# Patient Record
Sex: FEMALE | Race: WHITE | NOT HISPANIC OR LATINO | Employment: PART TIME | URBAN - METROPOLITAN AREA
[De-identification: names, ages, dates, MRNs, and addresses within clinical notes are randomized per-mention and may not be internally consistent; named-entity substitution may affect disease eponyms.]

---

## 2018-09-27 ENCOUNTER — OFFICE VISIT (OUTPATIENT)
Dept: CARDIOLOGY CLINIC | Facility: CLINIC | Age: 58
End: 2018-09-27

## 2018-09-27 VITALS
DIASTOLIC BLOOD PRESSURE: 72 MMHG | SYSTOLIC BLOOD PRESSURE: 134 MMHG | OXYGEN SATURATION: 98 % | HEART RATE: 80 BPM | WEIGHT: 187.9 LBS | BODY MASS INDEX: 28.48 KG/M2 | HEIGHT: 68 IN

## 2018-09-27 DIAGNOSIS — R07.9 CHEST PAIN, UNSPECIFIED TYPE: Primary | ICD-10-CM

## 2018-09-27 DIAGNOSIS — M19.90 ARTHRITIS: ICD-10-CM

## 2018-09-27 DIAGNOSIS — R20.2 NUMBNESS AND TINGLING IN LEFT ARM: ICD-10-CM

## 2018-09-27 DIAGNOSIS — R06.02 SHORTNESS OF BREATH: ICD-10-CM

## 2018-09-27 DIAGNOSIS — R20.0 NUMBNESS AND TINGLING IN LEFT ARM: ICD-10-CM

## 2018-09-27 PROCEDURE — 93000 ELECTROCARDIOGRAM COMPLETE: CPT | Performed by: INTERNAL MEDICINE

## 2018-09-27 PROCEDURE — 99243 OFF/OP CNSLTJ NEW/EST LOW 30: CPT | Performed by: INTERNAL MEDICINE

## 2018-09-27 RX ORDER — LORATADINE 10 MG/1
10 TABLET ORAL DAILY PRN
COMMUNITY

## 2018-09-27 RX ORDER — AMILORIDE HCL 5 MG
10 TABLET ORAL EVERY 4 HOURS PRN
COMMUNITY

## 2018-09-27 RX ORDER — COVID-19 ANTIGEN TEST
KIT MISCELLANEOUS AS NEEDED
COMMUNITY
End: 2019-09-20

## 2018-09-27 NOTE — LETTER
September 27, 2018     Maggie Mcdonald, [address] [facility]    Patient: Jorge Garcia   YOB: 1960   Date of Visit: 9/27/2018       Dear Dr Deisi Kumar: Thank you for referring Jorge Garcia to me for evaluation  Below are my notes for this consultation  If you have questions, please do not hesitate to call me  I look forward to following your patient along with you  Sincerely,        Medhat Nolasco MD        CC: No Recipients  Medhat Nolasco MD  9/27/2018  1:02 PM  Sign at close encounter                                             Cardiology Consultation     Jorge Garcia  [id number]  1960  [facility] [address] [id number]      1  Chest pain, unspecified type  POCT ECG    NM myocardial perfusion spect (rx stress and/or rest)   2  Shortness of breath  POCT ECG    NM myocardial perfusion spect (rx stress and/or rest)   3  Numbness and tingling in left arm  POCT ECG    NM myocardial perfusion spect (rx stress and/or rest)   4  Arthritis  NM myocardial perfusion spect (rx stress and/or rest)       Discussion/Summary:    Chest pain, shortness of breath on exertion  New symptoms, worse over the last few months  She is limited by arthritis of the hip, had replacement on the left, planned to have the right done  Normal EKG  Symptoms warrant evaluation with stress test  Given she walks with cane, will not be able to exercise on a treadmill, and therefore will check pharmacologic nuclear stress test   Blood work including last lipid panel reviewed  BP at goal     No changes to medical therapy at this time  If abnormalities on stress test, will evaluate further  If stress test normal, there would be no contraindication to proceeding with hip replacement either  History of Present Illness:    33-year-old female    She is referred to the office by Dr Deisi Kumar for evaluation of symptoms of chest pain     There is no known cardiac history  She tells me she was admitted to the hospital in 2009 or 10 for symptoms of chest pain  She was monitored overnight and said she had testing including a stress test and echocardiogram   Although she believes this was at the [facility], and fortunately do not have any records of this  She was told that these tests were unremarkable, and she has been relatively stable since that time  Recently, she has been experiencing symptoms chest pain and tingling down her left arm  She is typically pretty active  Her  does a lot of the heavier lifting, but they have a large property in [place] that they tend to  She has been noticing a discomfort on the left side of her chest which is worse when she does any exertion or activity  She has also been noticing getting winded when climbing up her flights of stairs which is new over the last few months  Some symptoms are atypical   For example, she felt a very sharp chest pain on the left side of her chest the other day which lasted for just a 2nd and then resolve spontaneously  This was so sharp it caused her to double over and scream out in pain  She has a history of reflux  She has been trying to associate any symptoms of her chest pain with any other circumstances such as position, food, time of day  She has not found anything reliable  She has also felt occasional palpitations  She feels these as skipped beat  She takes her pulse and notices that there is a skipped beat every 3rd to 7 beat on occasions  She denies any presyncope or syncope  When she saw her primary provider, manipulation of her neck in certain positions did reproduce some tingling of her left arm  She had hip surgery done in February  She still uses a cane  She has arthritis of her right hip and is to have surgery probably next year  Remote smoking when she was in college  No family history of coronary disease    Last lipid profile from a few years ago showed good control  Otherwise unremarkable blood work in February as well  There is no problem list on file for this patient  Past Medical History:   Diagnosis Date    GERD (gastroesophageal reflux disease)      Social History     Social History    Marital status: /Civil Union     Spouse name: N/A    Number of children: N/A    Years of education: N/A     Occupational History    Not on file       Social History Main Topics    Smoking status: Former Smoker     Quit date: 1983    Smokeless tobacco: Never Used    Alcohol use No    Drug use: No    Sexual activity: Not on file     Other Topics Concern    Not on file     Social History Narrative    No narrative on file      Family History   Problem Relation Age of Onset    Hypertension Mother     Diabetes Mother     Cancer Mother         breast    Coronary artery disease Mother     Arrhythmia Mother     Clotting disorder Mother         clot in leg    Cancer Father         lung    Hyperlipidemia Sister     Diabetes Paternal Grandmother     Diabetes Paternal Grandfather     Crohn's disease Sister     Stroke Neg Hx     Anuerysm Neg Hx      Past Surgical History:   Procedure Laterality Date    CYSTOSCOPY  1983    TOTAL HIP ARTHROPLASTY Left 02/15/2018    URETHRAL DILATION  1983       Current Outpatient Prescriptions:     loratadine (CLARITIN) 10 mg tablet, Take 10 mg by mouth daily, Disp: , Rfl:     Naproxen Sodium (ALEVE) 220 MG CAPS, Take by mouth as needed, Disp: , Rfl:     phenylephrine (SUDAFED PE) 10 MG TABS, Take 10 mg by mouth every 4 (four) hours as needed for congestion, Disp: , Rfl:   Allergies   Allergen Reactions    Sulfa Antibiotics        Vitals:    09/27/18 1128   BP: 134/72   BP Location: Left arm   Patient Position: Sitting   Cuff Size: Adult   Pulse: 80   SpO2: 98%   Weight: 85 2 kg (187 lb 14 4 oz)   Height: 5' 8" (1 727 m)     Vitals:    09/27/18 1128   Weight: 85 2 kg (187 lb 14 4 oz)      Height: 5' 8" (172 7 cm)   Body mass index is 28 57 kg/m²  Physical Exam:  GENERAL: Alert, well appearing, and in no distress  HEENT:  PERRL, EOMI, no scleral icterus, no conjunctival pallor  NECK:  Supple, No elevated JVP, no thyromegaly, no carotid bruits  HEART:  Regular rate and rhythm, normal S1/S2, no S3/S4, no murmur or rub  LUNGS:  Clear to auscultation bilaterally  ABDOMEN:  Soft, non-tender, positive bowel sounds, no rebound or guarding  EXTREMITIES:  No edema  VASCULAR:  Normal pedal pulses   NEURO: No focal deficits  Walks with cane  SKIN: Normal without suspicious lesions on exposed skin    ROS:  Positive for joint pain, arthritis, palpitations, reflux  Except as noted in HPI, is otherwise reviewed in detail on the paper form  Labs:  Lab Results   Component Value Date     05/05/2016    K 3 8 05/05/2016     05/05/2016    CREATININE 0 66 05/05/2016    BUN 19 05/05/2016    CO2 28 05/05/2016    ALT 23 05/05/2016    AST 14 05/05/2016    WBC 5 84 05/05/2016    HGB 12 9 05/05/2016    HCT 38 7 05/05/2016     05/05/2016       Lab Results   Component Value Date    CHOL 169 04/13/2015     Lab Results   Component Value Date    HDL 66 (H) 05/05/2016    HDL 73 04/13/2015     Lab Results   Component Value Date    LDLCALC 91 05/05/2016    LDLCALC 82 04/13/2015     Lab Results   Component Value Date    TRIG 84 05/05/2016    TRIG 69 04/13/2015     EKG:  Sinus rhythm, 80 beats per minute   Normal EKG

## 2018-09-27 NOTE — PROGRESS NOTES
Cardiology Consultation     Adam Sender  [id number]  1960  [facility] [address]  [facility] [address] [address]      1  Chest pain, unspecified type  POCT ECG    NM myocardial perfusion spect (rx stress and/or rest)   2  Shortness of breath  POCT ECG    NM myocardial perfusion spect (rx stress and/or rest)   3  Numbness and tingling in left arm  POCT ECG    NM myocardial perfusion spect (rx stress and/or rest)   4  Arthritis  NM myocardial perfusion spect (rx stress and/or rest)       Discussion/Summary:    Chest pain, shortness of breath on exertion  New symptoms, worse over the last few months  She is limited by arthritis of the hip, had replacement on the left, planned to have the right done  Normal EKG  Symptoms warrant evaluation with stress test  Given she walks with cane, will not be able to exercise on a treadmill, and therefore will check pharmacologic nuclear stress test   Blood work including last lipid panel reviewed  BP at goal     No changes to medical therapy at this time  If abnormalities on stress test, will evaluate further  If stress test normal, there would be no contraindication to proceeding with hip replacement either  History of Present Illness:    58-year-old female  She is referred to the office by Dr Amena Dolan for evaluation of symptoms of chest pain  There is no known cardiac history  She tells me she was admitted to the hospital in 2009 or 10 for symptoms of chest pain  She was monitored overnight and said she had testing including a stress test and echocardiogram   Although she believes this was at the [facility], and fortunately do not have any records of this  She was told that these tests were unremarkable, and she has been relatively stable since that time  Recently, she has been experiencing symptoms chest pain and tingling down her left arm    She is typically pretty active  Her  does a lot of the heavier lifting, but they have a large property in [place] that they tend to  She has been noticing a discomfort on the left side of her chest which is worse when she does any exertion or activity  She has also been noticing getting winded when climbing up her flights of stairs which is new over the last few months  Some symptoms are atypical   For example, she felt a very sharp chest pain on the left side of her chest the other day which lasted for just a 2nd and then resolve spontaneously  This was so sharp it caused her to double over and scream out in pain  She has a history of reflux  She has been trying to associate any symptoms of her chest pain with any other circumstances such as position, food, time of day  She has not found anything reliable  She has also felt occasional palpitations  She feels these as skipped beat  She takes her pulse and notices that there is a skipped beat every 3rd to 7 beat on occasions  She denies any presyncope or syncope  When she saw her primary provider, manipulation of her neck in certain positions did reproduce some tingling of her left arm  She had hip surgery done in February  She still uses a cane  She has arthritis of her right hip and is to have surgery probably next year  Remote smoking when she was in college  No family history of coronary disease  Last lipid profile from a few years ago showed good control  Otherwise unremarkable blood work in February as well  There is no problem list on file for this patient  Past Medical History:   Diagnosis Date    GERD (gastroesophageal reflux disease)      Social History     Social History    Marital status: /Civil Union     Spouse name: N/A    Number of children: N/A    Years of education: N/A     Occupational History    Not on file       Social History Main Topics    Smoking status: Former Smoker     Quit date: 1983    Smokeless tobacco: Never Used    Alcohol use No    Drug use: No    Sexual activity: Not on file     Other Topics Concern    Not on file     Social History Narrative    No narrative on file      Family History   Problem Relation Age of Onset    Hypertension Mother     Diabetes Mother     Cancer Mother         breast    Coronary artery disease Mother     Arrhythmia Mother     Clotting disorder Mother         clot in leg    Cancer Father         lung    Hyperlipidemia Sister     Diabetes Paternal Grandmother     Diabetes Paternal Grandfather     Crohn's disease Sister     Stroke Neg Hx     Anuerysm Neg Hx      Past Surgical History:   Procedure Laterality Date    CYSTOSCOPY  1983    TOTAL HIP ARTHROPLASTY Left 02/15/2018    URETHRAL DILATION  1983       Current Outpatient Prescriptions:     loratadine (CLARITIN) 10 mg tablet, Take 10 mg by mouth daily, Disp: , Rfl:     Naproxen Sodium (ALEVE) 220 MG CAPS, Take by mouth as needed, Disp: , Rfl:     phenylephrine (SUDAFED PE) 10 MG TABS, Take 10 mg by mouth every 4 (four) hours as needed for congestion, Disp: , Rfl:   Allergies   Allergen Reactions    Sulfa Antibiotics        Vitals:    09/27/18 1128   BP: 134/72   BP Location: Left arm   Patient Position: Sitting   Cuff Size: Adult   Pulse: 80   SpO2: 98%   Weight: 85 2 kg (187 lb 14 4 oz)   Height: 5' 8" (1 727 m)     Vitals:    09/27/18 1128   Weight: 85 2 kg (187 lb 14 4 oz)      Height: 5' 8" (172 7 cm)   Body mass index is 28 57 kg/m²      Physical Exam:  GENERAL: Alert, well appearing, and in no distress  HEENT:  PERRL, EOMI, no scleral icterus, no conjunctival pallor  NECK:  Supple, No elevated JVP, no thyromegaly, no carotid bruits  HEART:  Regular rate and rhythm, normal S1/S2, no S3/S4, no murmur or rub  LUNGS:  Clear to auscultation bilaterally  ABDOMEN:  Soft, non-tender, positive bowel sounds, no rebound or guarding  EXTREMITIES:  No edema  VASCULAR:  Normal pedal pulses   NEURO: No focal deficits  Walks with cane  SKIN: Normal without suspicious lesions on exposed skin    ROS:  Positive for joint pain, arthritis, palpitations, reflux  Except as noted in HPI, is otherwise reviewed in detail on the paper form  Labs:  Lab Results   Component Value Date     05/05/2016    K 3 8 05/05/2016     05/05/2016    CREATININE 0 66 05/05/2016    BUN 19 05/05/2016    CO2 28 05/05/2016    ALT 23 05/05/2016    AST 14 05/05/2016    WBC 5 84 05/05/2016    HGB 12 9 05/05/2016    HCT 38 7 05/05/2016     05/05/2016       Lab Results   Component Value Date    CHOL 169 04/13/2015     Lab Results   Component Value Date    HDL 66 (H) 05/05/2016    HDL 73 04/13/2015     Lab Results   Component Value Date    LDLCALC 91 05/05/2016    LDLCALC 82 04/13/2015     Lab Results   Component Value Date    TRIG 84 05/05/2016    TRIG 69 04/13/2015     EKG:  Sinus rhythm, 80 beats per minute   Normal EKG

## 2018-10-10 ENCOUNTER — HOSPITAL ENCOUNTER (OUTPATIENT)
Dept: NON INVASIVE DIAGNOSTICS | Facility: CLINIC | Age: 58
Discharge: HOME | End: 2018-10-10

## 2018-10-10 DIAGNOSIS — R07.9 CHEST PAIN, UNSPECIFIED TYPE: ICD-10-CM

## 2018-10-10 DIAGNOSIS — R06.02 SHORTNESS OF BREATH: ICD-10-CM

## 2018-10-10 DIAGNOSIS — M19.90 ARTHRITIS: ICD-10-CM

## 2018-10-10 DIAGNOSIS — R20.2 NUMBNESS AND TINGLING IN LEFT ARM: ICD-10-CM

## 2018-10-10 DIAGNOSIS — R20.0 NUMBNESS AND TINGLING IN LEFT ARM: ICD-10-CM

## 2018-10-10 PROCEDURE — 78452 HT MUSCLE IMAGE SPECT MULT: CPT

## 2018-10-10 PROCEDURE — 93017 CV STRESS TEST TRACING ONLY: CPT

## 2018-10-10 PROCEDURE — 78452 HT MUSCLE IMAGE SPECT MULT: CPT | Performed by: INTERNAL MEDICINE

## 2018-10-10 PROCEDURE — 93018 CV STRESS TEST I&R ONLY: CPT | Performed by: INTERNAL MEDICINE

## 2018-10-10 PROCEDURE — A9502 TC99M TETROFOSMIN: HCPCS

## 2018-10-10 PROCEDURE — 93016 CV STRESS TEST SUPVJ ONLY: CPT | Performed by: INTERNAL MEDICINE

## 2018-10-10 RX ADMIN — REGADENOSON 0.4 MG: 0.08 INJECTION, SOLUTION INTRAVENOUS at 13:58

## 2018-10-12 LAB
CHEST PAIN STATEMENT: NORMAL
MAX DIASTOLIC BP: 90 MMHG
MAX HEART RATE: 125 BPM
MAX PREDICTED HEART RATE: 160 BPM
MAX. SYSTOLIC BP: 164 MMHG
PROTOCOL NAME: NORMAL
REASON FOR TERMINATION: NORMAL
TARGET HR FORMULA: NORMAL
TEST INDICATION: NORMAL
TIME IN EXERCISE PHASE: NORMAL

## 2019-08-16 ENCOUNTER — OFFICE VISIT (OUTPATIENT)
Dept: CARDIOLOGY CLINIC | Facility: CLINIC | Age: 59
End: 2019-08-16

## 2019-08-16 VITALS
HEART RATE: 61 BPM | SYSTOLIC BLOOD PRESSURE: 130 MMHG | RESPIRATION RATE: 19 BRPM | BODY MASS INDEX: 26.07 KG/M2 | WEIGHT: 172 LBS | OXYGEN SATURATION: 99 % | HEIGHT: 68 IN | DIASTOLIC BLOOD PRESSURE: 82 MMHG

## 2019-08-16 DIAGNOSIS — Z01.810 PREOPERATIVE CARDIOVASCULAR EXAMINATION: Primary | ICD-10-CM

## 2019-08-16 PROCEDURE — 93000 ELECTROCARDIOGRAM COMPLETE: CPT | Performed by: INTERNAL MEDICINE

## 2019-08-16 PROCEDURE — 99214 OFFICE O/P EST MOD 30 MIN: CPT | Performed by: INTERNAL MEDICINE

## 2019-08-16 NOTE — PROGRESS NOTES
Cardiology Follow Up    Celeste Bryant  1960  [address] [facility] [place] [address]  [phone]    1  Preoperative cardiovascular examination  POCT ECG       Discussion/Summary:    Asymptomatic 55-year-old female from a cardiac standpoint  She is low risk from a cardiac standpoint to proceed with neurologic surgery to address Tarlov cysts  She had recent ischemic testing with a nuclear stress test which was unremarkable  I recommend no additional testing or changes to her medical therapy prior to the procedure  No cardiac contraindication to proceeding with surgery  Her EKG shows sinus rhythm with no abnormalities  History of Present Illness:     Pleasant 55-year-old female  I had seen her last year for symptoms of chest pain  She has no known cardiac history  Evaluation at that time with nuclear stress test was unremarkable  She returns to the office today for preoperative evaluation prior to surgery to address 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  Surgery to be done at the [address] [place] [address], and by her report will be quite extensive  From a cardiac standpoint, she has been unchanged  Her symptoms of shortness of breath her stable  Denies any chest pain to me  No other changes in her health since last time we met  Blood work was done and is reviewed through Care everywhere  EKG without any significant abnormalities today          Past Medical History:   Diagnosis Date    GERD (gastroesophageal reflux disease)      Social History     Tobacco Use    Smoking status: Former Smoker     Last attempt to quit:      Years since quittin 6    Smokeless tobacco: Never Used   Substance Use Topics    Alcohol use: No    Drug use: No      Family History   Problem Relation Age of Onset    Hypertension Mother     Diabetes Mother     Cancer Mother breast    Coronary artery disease Mother     Arrhythmia Mother     Clotting disorder Mother         clot in leg    Cancer Father         lung    Hyperlipidemia Sister     Diabetes Paternal Grandmother     Diabetes Paternal Grandfather     Crohn's disease Sister     Stroke Neg Hx     Anuerysm Neg Hx      Past Surgical History:   Procedure Laterality Date    CYSTOSCOPY  1983    TOTAL HIP ARTHROPLASTY Left 02/15/2018    URETHRAL DILATION  1983       Current Outpatient Medications:     loratadine (CLARITIN) 10 mg tablet, Take 10 mg by mouth as needed , Disp: , Rfl:     Naproxen Sodium (ALEVE) 220 MG CAPS, Take by mouth as needed, Disp: , Rfl:     phenylephrine (SUDAFED PE) 10 MG TABS, Take 10 mg by mouth every 4 (four) hours as needed for congestion, Disp: , Rfl:   Allergies   Allergen Reactions    Sulfa Antibiotics        Vitals:    08/16/19 1132   BP: 130/82   BP Location: Left arm   Patient Position: Sitting   Cuff Size: Large   Pulse: 61   Resp: 19   SpO2: 99%   Weight: 78 kg (172 lb)   Height: 5' 8" (1 727 m)     Vitals:    08/16/19 1132   Weight: 78 kg (172 lb)      Height: 5' 8" (172 7 cm)   Body mass index is 26 15 kg/m²  Physical Exam:  GENERAL: Alert, well appearing, and in no distress  HEENT:  PERRL, EOMI, no scleral icterus, no conjunctival pallor  NECK:  Supple, No elevated JVP, no thyromegaly, no carotid bruits  HEART:  Regular rate and rhythm, normal S1/S2, no S3/S4, no murmur or rub  LUNGS:  Clear to auscultation bilaterally  ABDOMEN:  Soft, non-tender, positive bowel sounds, no rebound or guarding  EXTREMITIES:  No edema  VASCULAR:  Normal pedal pulses   SKIN: Normal without suspicious lesions on exposed skin      ROS:  Positive for weakness, neurologic changes, uses cane  Except as noted in HPI, is otherwise reviewed in detail and a 12 point review of systems is negative      Labs:  Lab Results   Component Value Date     04/13/2015    K 3 8 05/05/2016     05/05/2016 CREATININE 0 66 05/05/2016    BUN 19 05/05/2016    CO2 28 05/05/2016    ALT 23 05/05/2016    AST 14 05/05/2016    WBC 5 84 05/05/2016    HGB 12 9 05/05/2016    HCT 38 7 05/05/2016     05/05/2016       Lab Results   Component Value Date    CHOL 169 04/13/2015     Lab Results   Component Value Date    LDLCALC 91 05/05/2016    LDLCALC 82 04/13/2015     Lab Results   Component Value Date    HDL 66 (H) 05/05/2016    HDL 73 04/13/2015     Lab Results   Component Value Date    TRIG 84 05/05/2016    TRIG 69 04/13/2015       Testing:  Stress 10/10/18:     MYOCARDIAL PERFUSION IMAGING:  The image quality was excellent  Left ventricular size was normal  The TID ratio was 1 08      PERFUSION DEFECTS:  -  There were no perfusion defects      GATED SPECT:  The calculated left ventricular ejection fraction was 55 %  Left ventricular ejection fraction was within normal limits by visual estimate  There was no left ventricular regional abnormality      SUMMARY:  -  Stress results: There was no chest pain during stress  -  ECG conclusions: The stress ECG was negative for ischemia and normal   -  Perfusion imaging: There were no perfusion defects   -  Gated SPECT: The calculated left ventricular ejection fraction was 55 %  Left ventricular ejection fraction was within normal limits by visual estimate  There was no left ventricular regional abnormality      IMPRESSIONS: Normal study after pharmacologic vasodilation  Myocardial perfusion imaging was normal at rest and with stress  Left ventricular systolic function was normal     EKG:  Sinus rhythm  63 beats per minute  Normal EKG

## 2019-09-19 ENCOUNTER — OFFICE VISIT (OUTPATIENT)
Dept: OBGYN CLINIC | Facility: CLINIC | Age: 59
End: 2019-09-19

## 2019-09-19 VITALS
BODY MASS INDEX: 26.52 KG/M2 | WEIGHT: 175 LBS | SYSTOLIC BLOOD PRESSURE: 120 MMHG | HEIGHT: 68 IN | DIASTOLIC BLOOD PRESSURE: 84 MMHG

## 2019-09-19 DIAGNOSIS — N63.15 BREAST LUMP ON RIGHT SIDE AT 12 O'CLOCK POSITION: ICD-10-CM

## 2019-09-19 DIAGNOSIS — Z01.419 GYNECOLOGIC EXAM NORMAL: Primary | ICD-10-CM

## 2019-09-19 DIAGNOSIS — Z12.31 BREAST CANCER SCREENING BY MAMMOGRAM: ICD-10-CM

## 2019-09-19 DIAGNOSIS — N63.15 BREAST LUMP ON RIGHT SIDE AT 6 O'CLOCK POSITION: ICD-10-CM

## 2019-09-19 PROBLEM — Z01.818 ENCOUNTER FOR PREADMISSION TESTING: Status: ACTIVE | Noted: 2018-09-13

## 2019-09-19 PROBLEM — M54.9 BACK PAIN: Status: ACTIVE | Noted: 2019-08-09

## 2019-09-19 PROBLEM — M16.11 OSTEOARTHRITIS OF RIGHT HIP: Status: ACTIVE | Noted: 2018-02-13

## 2019-09-19 PROCEDURE — S0610 ANNUAL GYNECOLOGICAL EXAMINA: HCPCS | Performed by: PHYSICIAN ASSISTANT

## 2019-09-20 PROBLEM — M16.12 PRIMARY OSTEOARTHRITIS OF LEFT HIP: Status: ACTIVE | Noted: 2017-11-20

## 2019-09-20 PROBLEM — G96.191 TARLOV CYSTS: Status: ACTIVE | Noted: 2018-05-01

## 2019-09-20 RX ORDER — POLYETHYLENE GLYCOL 3350 17 G/17G
17 POWDER, FOR SOLUTION ORAL DAILY
COMMUNITY
Start: 2019-09-04 | End: 2020-10-28

## 2019-09-20 RX ORDER — OXYCODONE HYDROCHLORIDE 5 MG/1
5-10 TABLET ORAL
Status: ON HOLD | COMMUNITY
Start: 2019-09-03 | End: 2020-10-29 | Stop reason: ALTCHOICE

## 2019-09-20 RX ORDER — CYCLOBENZAPRINE HCL 10 MG
10 TABLET ORAL
COMMUNITY
Start: 2019-09-03 | End: 2020-10-28

## 2019-09-20 RX ORDER — ACETAMINOPHEN 500 MG
500-1000 TABLET ORAL EVERY 6 HOURS PRN
COMMUNITY
Start: 2019-09-03

## 2019-09-20 NOTE — PROGRESS NOTES
Assessment/Plan   Problem List Items Addressed This Visit        Other    Gynecologic exam normal - Primary     Pap guidelines reviewed  Reports last pap 2016  Patient deferred today with recent irritation from not having pessary in  Will consider next year  Reviewed with patient continue to monitor prolapse with current pessary  If noticing discomfort or feeling more of prolapse with pessary in recommend coming in with Dr Lashae Moses to get refitted for a different pessary  Script for diagnostic mammogram and ultrasound of breasts given secondary to breast lumps found on exam today  Return to office for annual or as needed  Other Visit Diagnoses     Breast cancer screening by mammogram        Relevant Orders    Mammo screening bilateral w cad    Mammo screening left w 3d & cad    Breast lump on right side at 12 o'clock position        Relevant Orders    US breast right limited (diagnostic)    Mammo diagnostic right w 3d & cad    Breast lump on right side at 6 o'clock position        Relevant Orders    US breast right limited (diagnostic)    Mammo diagnostic right w 3d & cad          Subjective:     Patient ID: Jo Ritchie is a 62 y o  y o  female  HPI  61 yo seen for annual exam  Patient recently underwent back surgery for Tarlov cysts on 8/27/2019  Reports since surgery has been having some numbness in perineum  Reports spine surgeon said it is normal complication of surgery and sensation will likely return with time  Has history of uterovaginal prolapse has been using a pessary for many years  Had pessary out for a few months and noticed significant prolapse, Reports able to feel "smooth tissue" when washing  Reinserted pessary about a week ago  Denies discomfort but unsure if pessary is still work to hold up tissues  Also unsure if she was able to put it in correctly because she does not have much feeling with the nerve damage from surgery  Patient denies bowel or bladder issues     Last pap: 3/30/2015 NILM (-)HRHPV  Last mammogram: 2016? The following portions of the patient's history were reviewed and updated as appropriate:   She  has a past medical history of Collagen disorder ([address] ), GERD (gastroesophageal reflux disease), Urinary tract infection (9/6/19), and Varicella (1965)  She   Patient Active Problem List    Diagnosis Date Noted    Gynecologic exam normal 09/24/2019    Back pain 08/09/2019    Encounter for preadmission testing 09/13/2018    Tarlov cysts 05/01/2018    Primary osteoarthritis of left hip 11/20/2017    Uterovaginal prolapse 10/26/2015    Sprain of wrist, right 10/31/2014    Wrist pain 10/31/2014     She  has a past surgical history that includes Total hip arthroplasty (Left, 02/15/2018); Urethral dilation (1983); and Cystoscopy (1983)  Her family history includes Arrhythmia in her mother; Breast cancer in her mother; Cancer in her father and mother; Clotting disorder in her mother; Coronary artery disease in her mother; Crohn's disease in her sister; Diabetes in her mother, paternal grandfather, and paternal grandmother; Heart failure in her mother; Hyperlipidemia in her mother and sister; Hypertension in her mother; Migraines in her son; Osteoarthritis in her father  She  reports that she quit smoking about 42 years ago  Her smoking use included cigarettes  She has a 3 00 pack-year smoking history  She has never used smokeless tobacco  She reports that she does not drink alcohol or use drugs    Current Outpatient Medications   Medication Sig Dispense Refill    acetaminophen (TYLENOL) 500 mg tablet Take 500-1,000 mg by mouth      cyclobenzaprine (FLEXERIL) 10 mg tablet Take 10 mg by mouth      oxyCODONE (ROXICODONE) 5 mg immediate release tablet Take 5-10 mg by mouth      polyethylene glycol (MIRALAX) 17 g packet Take 17 g by mouth daily      Sennosides 17 2 MG TABS Take 17 2 mg by mouth 2 (two) times a day      loratadine (CLARITIN) 10 mg tablet Take 10 mg by mouth as needed       phenylephrine (SUDAFED PE) 10 MG TABS Take 10 mg by mouth every 4 (four) hours as needed for congestion       No current facility-administered medications for this visit  She is allergic to sulfa antibiotics       Menstrual History:  OB History        4    Para   4    Term   3       1    AB        Living   4       SAB        TAB        Ectopic        Multiple        Live Births   3                Menarche age: 10  No LMP recorded  Review of Systems   Constitutional: Negative for fatigue, fever and unexpected weight change  HENT: Negative for dental problem and sinus pressure  Eyes: Negative for visual disturbance  Respiratory: Negative for cough, shortness of breath and wheezing  Cardiovascular: Negative for chest pain  Gastrointestinal: Negative for abdominal pain, blood in stool, constipation, diarrhea, nausea and vomiting  Endocrine: Negative for polydipsia  Genitourinary: Negative for difficulty urinating, dyspareunia, dysuria, frequency, hematuria, pelvic pain and urgency  Musculoskeletal: Negative for arthralgias and back pain  Neurological: Negative for dizziness, seizures, light-headedness and headaches  Psychiatric/Behavioral: Negative for suicidal ideas  The patient is not nervous/anxious  Objective:  Vitals:    19 1406   BP: 120/84   BP Location: Left arm   Patient Position: Sitting   Cuff Size: Standard   Weight: 79 4 kg (175 lb)   Height: 5' 8" (1 727 m)      Physical Exam   Constitutional: She is oriented to person, place, and time  She appears well-developed and well-nourished  Genitourinary: Rectum normal, vagina normal and uterus normal  There is no rash, tenderness, lesion, injury or Bartholin's cyst on the right labia  There is no rash, tenderness, lesion, injury or Bartholin's cyst on the left labia  Vagina exhibits no lesion  No erythema, tenderness or bleeding in the vagina   No signs of injury around the vagina  No vaginal discharge found  Right adnexum does not display mass, does not display tenderness and does not display fullness  Left adnexum does not display mass, does not display tenderness and does not display fullness  Cervix does not exhibit motion tenderness, lesion or discharge  Uterus is not enlarged, tender, exhibiting a mass, irregular (is regular) or mobile  Rectal exam shows no external hemorrhoid, no internal hemorrhoid, no fissure, no mass, no tenderness, anal tone normal and guaiac negative stool  Genitourinary Comments: No prolapse of tissues with pessary in  Removed pessary  1st -2nd degree uterine vaginal prolapse and bladder prolapse  Reinserted pessary  Patient reports in comfortable position and no evidence of prolapse with pessary inserted  HENT:   Head: Normocephalic and atraumatic  Neck: No thyromegaly present  Cardiovascular: Normal rate, regular rhythm and normal heart sounds  Exam reveals no gallop and no friction rub  No murmur heard  Pulmonary/Chest: Effort normal and breath sounds normal  No respiratory distress  She has no wheezes  Right breast exhibits no inverted nipple, no mass, no nipple discharge, no skin change and no tenderness  Left breast exhibits no inverted nipple, no mass, no nipple discharge, no skin change and no tenderness  No breast swelling, tenderness, discharge or bleeding  Breasts are symmetrical        Abdominal: Soft  She exhibits no distension and no mass  There is no tenderness  There is no rebound and no guarding  No hernia  Lymphadenopathy:     She has no cervical adenopathy  Right: No inguinal adenopathy present  Left: No inguinal adenopathy present  Neurological: She is alert and oriented to person, place, and time  Skin: Skin is warm and dry  Psychiatric: She has a normal mood and affect   Her behavior is normal

## 2019-09-24 PROBLEM — Z01.419 GYNECOLOGIC EXAM NORMAL: Status: ACTIVE | Noted: 2019-09-24

## 2019-09-24 NOTE — ASSESSMENT & PLAN NOTE
Pap guidelines reviewed  Reports last pap 2016  Patient deferred today with recent irritation from not having pessary in  Will consider next year  Reviewed with patient continue to monitor prolapse with current pessary  If noticing discomfort or feeling more of prolapse with pessary in recommend coming in with Dr Verena Oro to get refitted for a different pessary  Script for diagnostic mammogram and ultrasound of breasts given secondary to breast lumps found on exam today  Return to office for annual or as needed  07-Mar-2017 19:05

## 2019-10-09 ENCOUNTER — TRANSCRIBE ORDERS (OUTPATIENT)
Dept: ADMINISTRATIVE | Facility: HOSPITAL | Age: 59
End: 2019-10-09

## 2019-10-09 DIAGNOSIS — N63.15 BREAST LUMP ON RIGHT SIDE AT 6 O'CLOCK POSITION: Primary | ICD-10-CM

## 2019-10-09 DIAGNOSIS — N63.15 BREAST LUMP ON RIGHT SIDE AT 12 O'CLOCK POSITION: ICD-10-CM

## 2019-10-10 ENCOUNTER — TELEPHONE (OUTPATIENT)
Dept: MAMMOGRAPHY | Facility: CLINIC | Age: 59
End: 2019-10-10

## 2019-10-10 NOTE — TELEPHONE ENCOUNTER
Outreach made to pt to set up with [facility] per conversation with central scheduling yesterday that pt was self-pay, pt states she is part of medical needs sharing group, pt self-pay with reimbursement, did not want to set up on [facility] as she gets some reimbursement from this medical needs sharing group, states she doesn't have medical insurance by choice, has used  in past and wished to do so again, given diagnostic codes and  number, states she will call and get pricing, also given name/# for any further assistance

## 2019-11-04 ENCOUNTER — HOSPITAL ENCOUNTER (OUTPATIENT)
Dept: ULTRASOUND IMAGING | Facility: CLINIC | Age: 59
Discharge: HOME | End: 2019-11-04
Payer: COMMERCIAL

## 2019-11-04 ENCOUNTER — HOSPITAL ENCOUNTER (OUTPATIENT)
Dept: MAMMOGRAPHY | Facility: CLINIC | Age: 59
Discharge: HOME | End: 2019-11-04
Payer: COMMERCIAL

## 2019-11-04 VITALS — BODY MASS INDEX: 26.98 KG/M2 | HEIGHT: 68 IN | WEIGHT: 178 LBS

## 2019-11-04 DIAGNOSIS — N63.15 BREAST LUMP ON RIGHT SIDE AT 12 O'CLOCK POSITION: ICD-10-CM

## 2019-11-04 DIAGNOSIS — N63.15 BREAST LUMP ON RIGHT SIDE AT 6 O'CLOCK POSITION: ICD-10-CM

## 2019-11-04 PROCEDURE — G0279 TOMOSYNTHESIS, MAMMO: HCPCS

## 2019-11-04 PROCEDURE — 76642 ULTRASOUND BREAST LIMITED: CPT

## 2019-11-04 PROCEDURE — 77066 DX MAMMO INCL CAD BI: CPT

## 2019-11-18 ENCOUNTER — OFFICE VISIT (OUTPATIENT)
Dept: OBGYN CLINIC | Facility: CLINIC | Age: 59
End: 2019-11-18

## 2019-11-18 VITALS
DIASTOLIC BLOOD PRESSURE: 90 MMHG | BODY MASS INDEX: 27.8 KG/M2 | HEIGHT: 68 IN | SYSTOLIC BLOOD PRESSURE: 138 MMHG | WEIGHT: 183.4 LBS

## 2019-11-18 DIAGNOSIS — N81.4 UTEROVAGINAL PROLAPSE: Primary | ICD-10-CM

## 2019-11-18 DIAGNOSIS — N81.89 PELVIC FLOOR WEAKNESS: ICD-10-CM

## 2019-11-18 PROCEDURE — 99213 OFFICE O/P EST LOW 20 MIN: CPT | Performed by: OBSTETRICS & GYNECOLOGY

## 2019-11-18 RX ORDER — ACETAMINOPHEN AND CODEINE PHOSPHATE 300; 30 MG/1; MG/1
TABLET ORAL
COMMUNITY
Start: 2019-09-24 | End: 2020-10-28

## 2019-11-18 NOTE — PROGRESS NOTES
Subjective     Celeste Rock is a 62 y o   female here for a problem visit  Patient recently had spinal surgery due to Tarlov cysts  Patient has laminectomy in the L5-S4 region with large cysts decompressed  Patient had pessary out during this time frame and as she has gotten more active she has replaced the pessary that has decreased sensation and almost absent sensation on her left pelvis and perineum  This is slowly improving and the neurologist or hopeful that she will regain sensation and tone over the next year or 2  Patient fully evaluated Zigmund Tate is approximately 2/5 on the right and absent on the left  Patient was in shower and noted bulging of tissue following her surgery, replaced her pessary but has decreased sensation and feels it is not holding up as well as previously  Personal health questionnaire reviewed: yes  Gynecologic History  No LMP recorded  Patient is postmenopausal   Contraception: post menopausal status  Last Pap:    Results were: normal  Last mammogram:  2019   Results were: normal    Obstetric History  OB History    Para Term  AB Living   5 5 4 1   4   SAB TAB Ectopic Multiple Live Births           4      # Outcome Date GA Lbr Elio/2nd Weight Sex Delivery Anes PTL Lv   5 Term 96 38w0d   M Vag-Spont   MJ      Birth Comments: bed rest   4 Term 94 37w0d   F Vag-Spont   MJ      Birth Comments: bed rest    3 Term 91 37w0d   F Vag-Spont   MJ      Birth Comments: Premature labor at 29 weeks, delivered at 40     2  84 33w0d   M Vag-Spont   MJ      Birth Comments: partial previa      Complications: Placenta Previa   1 Term                  The following portions of the patient's history were reviewed and updated as appropriate: allergies, current medications, past family history, past medical history, past social history, past surgical history and problem list     Review of Systems  Review of Systems   Constitutional: Negative for chills, fatigue, fever and unexpected weight change  HENT: Negative for dental problem, sinus pressure and sinus pain  Eyes: Negative for visual disturbance  Respiratory: Negative for cough, shortness of breath and wheezing  Cardiovascular: Negative for chest pain and leg swelling  Gastrointestinal: Negative for constipation, diarrhea, nausea and vomiting  Genitourinary: Negative for urgency  Musculoskeletal: Negative for back pain and joint swelling  Allergic/Immunologic: Negative for environmental allergies  Neurological: Negative for dizziness and headaches  Psychiatric/Behavioral: The patient is not nervous/anxious  Objective     /90 (BP Location: Left arm, Patient Position: Sitting, Cuff Size: Standard)   Ht 5' 8" (1 727 m)   Wt 83 2 kg (183 lb 6 4 oz)   BMI 27 89 kg/m²   General appearance: alert and oriented, in no acute distress  Pelvic: external genitalia normal, vagina normal without discharge, no cervical motion tenderness, no adnexal masses or tenderness and Size 8 ring with support attempted to be placed which is slightly larger than patient's current Schaatz pessary however it did not fit and protruded below the pubic bone, reviewed with patient that the changes seem more related to her loss of muscular tone   patient is currently able to retain her Schaatz pessary    Assessment  Reviewed and discussed with patient that decreased muscular tone following her spinal surgery is responsible for her current symptoms and not increased prolapse  Larger pessary does not seem appropriate as it did not fit  Counseled reviewed on options to evaluate and treat  Plan  Recommend urogynecology follow-up and evaluation for possible pelvic stimulation and biofeedback  Patient given referral and should return for annual or sooner as needed

## 2020-01-07 ENCOUNTER — TELEPHONE (OUTPATIENT)
Dept: OBGYN CLINIC | Facility: CLINIC | Age: 60
End: 2020-01-07

## 2020-01-07 NOTE — TELEPHONE ENCOUNTER
Was sent to female pelvic medicine  Is trying to get info to send and needs medical release of information  Needs entire medical record, labs and radiology

## 2020-01-23 ENCOUNTER — TRANSCRIBE ORDERS (OUTPATIENT)
Dept: ADMINISTRATIVE | Facility: HOSPITAL | Age: 60
End: 2020-01-23

## 2020-01-23 DIAGNOSIS — R31.9 HEMATURIA, UNSPECIFIED TYPE: Primary | ICD-10-CM

## 2020-01-27 ENCOUNTER — HOSPITAL ENCOUNTER (OUTPATIENT)
Dept: CT IMAGING | Facility: HOSPITAL | Age: 60
Discharge: HOME | End: 2020-01-27
Attending: OBSTETRICS & GYNECOLOGY
Payer: COMMERCIAL

## 2020-01-27 DIAGNOSIS — R31.9 HEMATURIA, UNSPECIFIED TYPE: ICD-10-CM

## 2020-01-27 PROCEDURE — 74178 CT ABD&PLV WO CNTR FLWD CNTR: CPT

## 2020-01-27 RX ADMIN — IOHEXOL 100 ML: 350 INJECTION, SOLUTION INTRAVENOUS at 08:43

## 2020-02-04 ENCOUNTER — TRANSCRIBE ORDERS (OUTPATIENT)
Dept: PHYSICAL THERAPY | Facility: REHABILITATION | Age: 60
End: 2020-02-04

## 2020-02-04 ENCOUNTER — EVALUATION (OUTPATIENT)
Dept: PHYSICAL THERAPY | Facility: REHABILITATION | Age: 60
End: 2020-02-04
Payer: COMMERCIAL

## 2020-02-04 DIAGNOSIS — N81.84 PELVIC MUSCLE WASTING: Primary | ICD-10-CM

## 2020-02-04 DIAGNOSIS — N81.6 RECTOCELE: ICD-10-CM

## 2020-02-04 DIAGNOSIS — N39.3 SUI (STRESS URINARY INCONTINENCE, FEMALE): ICD-10-CM

## 2020-02-04 DIAGNOSIS — N81.11 MIDLINE CYSTOCELE: ICD-10-CM

## 2020-02-04 DIAGNOSIS — N81.89 PELVIC FLOOR WEAKNESS: ICD-10-CM

## 2020-02-04 DIAGNOSIS — R15.2 FECAL URGENCY: ICD-10-CM

## 2020-02-04 PROCEDURE — 97162 PT EVAL MOD COMPLEX 30 MIN: CPT | Performed by: PHYSICAL THERAPIST

## 2020-02-04 NOTE — PROGRESS NOTES
PT Evaluation     Today's date: 2020  Patient name: Toy Goodman  : 1960  MRN: [id number]  Referring provider: Romayne Reader, MD  Dx:   Encounter Diagnosis     ICD-10-CM    1  Pelvic muscle wasting N81 84    2  DAYRON (stress urinary incontinence, female) N39 3    3  Fecal urgency R15 2    4  Pelvic floor weakness N81 89    5  Midline cystocele N81 11    6  Rectocele N81 6        Start Time: 9435  Stop Time: 1000  Total time in clinic (min): 50 minutes    Assessment  Assessment details: The patient is a 62year old female with complaints of urinary urgency and leakage as well as fecal urgency and leakage  She has a history of Tarlov cysts with surgery to remove these on 2019  She also had a laminectomy during this same surgery  She reports that since the surgery her symptoms related to her prolapse have been worse, especially since she also reports loss of sensation in the perineal region since surgery  She does have a visible cystocele with visual examination of the perineum and her pessary is in place  There is further descent with cue to bear down  She does have good awareness of her pelvic floor muscles at this time  She is able to isolate and contract her pelvic floor muscles with fair strength  She does have reported numbness to light touch with Q-tip on the left S2-S3-S4 dermatomes  She would benefit from pelvic floor therapy to help reduce/manage her symptoms, address her impairments, and maximize her pain free function upon discharge  She will be given HEP throughout episode of care  Thank you for the referral    Impairments: abnormal coordination, abnormal gait, abnormal muscle tone, activity intolerance, impaired physical strength, lacks appropriate home exercise program, pain with function and poor body mechanics  Understanding of Dx/Px/POC: good   Prognosis: good    Goals  ST  The patient will improve pelvic floor muscle strength 1 grade in 8 weeks     2  The patient will improve pelvic floor muscle endurance to 3 to 5 seconds in 8 weeks  3  The patient will reduce pain by 25% in 8 weeks  LT  The patient will improve pelvic floor muscle awareness and sensation upon discharge  2  The patient will reduce frequency of fecal urgency by 50% upon discharge  3  The patient will reduce stress urinary incontinence by 50% frequency upon discharge  Plan  Patient would benefit from: skilled PT  Planned modality interventions: biofeedback and ultrasound (Real Time Ultrasound)  Planned therapy interventions: manual therapy, neuromuscular re-education, patient education, strengthening, therapeutic exercise, therapeutic training, home exercise program, abdominal trunk stabilization, self care, postural training, therapeutic activities and stretching  Frequency: 1x week  Duration in visits: 8  Duration in weeks: 8  Plan of Care beginning date: 2020  Plan of Care expiration date: 3/31/2020  Treatment plan discussed with: patient        PT Pelvic Floor Subjective:   History of Present Illness: The patient notes that she was having symptoms of a cystocele and rectocele since   She notes that this was right around when she was determined to be post menopausal  She was diagnosed with Tarlov cysts and had surgery on 2019  She notes that since the surgery she has less feeling in her perineal region as well as less control of her bowel and bladder  She did follow up with her neurosurgeon once since surgery  She will follow up again in 6 months if she continues to feel that she has not made any progress in regards to her symptoms  She also notes that she saw her GYN, Dr Reba Bonilla, who referred her to a Urogynecologist  She saw Dr Bryn Blanco in regards to her symptoms and her recommended surgery to help correct her symptoms  The patient elected to not have surgery at this time and he referred her to pelvic floor therapy   She will follow up with Dr Bryn Blanco to have a cystoscopy performed and then a general follow up in July of 2020  The patient reports that her prolapse is just at the edge of her vagina without her pessary  She reports that she is unable to feel this  She attempted to perform Deadra Mary, but felt that she had no response from her muscles  She does feel that she has some sensation on the right compared to the left  She notes that the two larger Tarlov cysts that were removed were on the left side (S2-S3)  Date of surgery: 8/27/2019  Mechanism of injury: surgery    Tarlov lumbosacral Cysts removed with a laminectomy from L5 - S4  Social Support:     Lives with:  Spouse    Relationship status: /committed    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[employer])    Depression: none    Hand dominance:  Left  Diet and Exercise:    Diet:balanced nutrition    Exercise type: walking    Exercise frequency: daily    Since surgery      Not exercising due to: pain  OB/ gyn History    Gestational History:     Number of prior pregnancies: 4    Number of term pregnancies: 4    Delivery Type: vaginal delivery      Number of vaginal deliveries: 4    Delivery Complications:  Laparoscopy due to heavy periods - some evidence of ENDOMETRIOSIS    First child was born in 80 - 30, [age]years old  1st child partial placenta previa with abruption - delivered at 33 weeks; perineal tear  2nd child - labor at 29 weeks and delivered at 42 weeks with episiotomy  Last two pregnancies patient was high risk and on bed rest after 32; delivered at 39 and 38 weeks - episiotomies for both  No epidurals    Menstrual History:      Menopausal: menopause (last MS in 2014)  no hormone replacement therapy  Bladder Function:     Voiding Difficulties positive for: urgency, straining (on occasion) and incomplete emptying      Voiding Difficulties negative for: frequent urination      Voiding Difficulties comments:     Voiding frequency: every 1-2 hours and every 3-4 hours    Urinary leakage: urine leakage    Urinary leakage aggravated by: coughing, sneezing, bending (lifting), exercise (walking), standing up, walking to the bathroom (with an urge) and unknown (randomly)    Nocturia (episodes per night): 1 and 2    Painful urination: No      Fluid Intake Type: Water    Intake (ounces): Intake (ounces) comment: Mostly water - 5 - 6 cups (6 oz) per day  1 cup of coffee in the morning  Protein shake (vegan) during the day  Incontinence Management:     Pads/Diaper Use:  24 hours    Pads/Diapers Additional Comments: light pad   Bowel Function:     Voiding DIfficulties: urgency and unfinished feeling after defecating      Bowel Function comments: Bowel Movements usually in the morning; multiple small bowel movements until completely empty  Stool is soft  Patient notices very little control when her stools are loose - maybe 15 seconds to get to the bathroom with urge    Constipation is rare - but stool that is too well formed causing pain and stinging in tailbone area  Loose stools not often  Vaginal splinting at times when stool is too hard; patient feels hard bulge    Bowel frequency: daily and multiple times a day  Sexual Function:     Sexually Active:  Sexually active    Pain during intercourse: No (not painful, but not pleasurable; patient has diminished feeling)      pain does not cause abstinence  Pain:     Current pain ratin    At best pain rating:  3    At worst pain ratin    Location:  Right lower quadrant radiates into the groin and right lower back    Quality:  Dull ache and numbness    Aggravating factors:   Bowel movements and intercourse (activity)    Duration of symptoms:  Does not go away    Progression:  Worsening      Objective   Pelvic Floor Exam     General Perineum Exam:   Positive for descent, gaping introitus and no pelvic organ prolapse at rest    Negative for swelling, lesion, rectal irritation, hemorrhoids and perianal erythema    General perineum exam comments: Pelvic floor verbal consent and written consent signed and in chart  Patient deferred second person in room: YES/NO    Education provided today: 10 min  Time Spent on Patient Education:  Pelvic floor anatomy and function  Physiology/relationship of abdominal canister and pelvis/pelvic organs/pelvic floor muscles  Diaphragm and Diaphragmatic breathing  Bowel and Bladder anatomy and function  Importance of body mechanics and ergonomics in regards to protecting against activities which increase IAP and pressure  Toileting posture and body mechanics  PT exam and course of treatment    Further assessment to be performed NV    Visual Inspection of Perineum:   Excursion of perineal body in caudal direction with relaxation of pelvic floor muscles (PFM): weak  Sensation: diminished    Pelvic Organ Prolapse   At rest: none  With bearing down: severe (1 cm beyond the level of hymenal remnants)  Location: anterior and posterior    Pelvic Floor Muscle Exam     Palpation   No increased muscle tension in the bulbospongiosus, ischiocavernosus, super transverse perineal and puborectalis  No tenderness on right in the bulbospongiosus, ischiocavernosus, super transverse perineal and puborectalis  No tenderness on left in the bulbospongiosus, ischiocavernosus, super transverse perineal and puborectalis    Muscle Contraction: well isolated  Breathing pattern with contraction: within normal limits  Pelvic floor muscle relaxation is complete       PERFECT Score   Power right: 2/5  Power left: 2/5        Flowsheet Rows      Most Recent Value   PT/OT G-Codes   Assessment Type  Evaluation   G code set  Self-Care   Self Care Current Status ()  CK   Self Care Goal Status ()  CK           Precautions: neurosurgery to remove Tarlov cysts in August 2019; pelvic organ prolapse; fall risk  Daily Treatment Diary     Manual  2/4                                                                                 Exercise Diary  2/4 [facility]: quick flicks 0"MEFEP/7"EQBG             [facility]: slow holds 5"work/10"rest             [facility]: elevators             TA ADIM             TA ADIM + [facility]             TA ADIM + [facility] + hip add isometrics             TA ADIM + [facility] + hip abd isometrics             TA + [facility] in quadruped             Seated [facility]             [place]ball seated [address]             [facility] with sit to stand             [address] with squat             [facility] with step ups                                                                                                            Modalities

## 2020-02-04 NOTE — LETTER
2020    Nuria Hernandes MD  [address]  [address]  [address]  [address]    Patient: Gene Valdivia   YOB: 1960   Date of Visit: 2020     Encounter Diagnosis     ICD-10-CM    1  Pelvic muscle wasting N81 84    2  DAYRON (stress urinary incontinence, female) N39 3    3  Fecal urgency R15 2    4  Pelvic floor weakness N81 89    5  Midline cystocele N81 11    6  Rectocele N81 6        Dear Dr Messi Vasquez:    Thank you for your recent referral of Celeste Bryant  Please review the attached evaluation summary from Celeste's recent visit  Please verify that you agree with the plan of care by signing the attached order  If you have any questions or concerns, please do not hesitate to call  I sincerely appreciate the opportunity to share in the care of one of your patients and hope to have another opportunity to work with you in the near future  Sincerely,    Jackie Romano, PT      Referring Provider:      I certify that I have read the below Plan of Care and certify the need for these services furnished under this plan of treatment while under my care  Nuria Hernandes MD  [address]  [address]  [address]  [address]: [phone]          PT Evaluation     Today's date: 2020  Patient name: Gene Valdivia  : 1960  MRN: [id number]  Referring provider: Matti Leavitt MD  Dx:   Encounter Diagnosis     ICD-10-CM    1  Pelvic muscle wasting N81 84    2  DAYRON (stress urinary incontinence, female) N39 3    3  Fecal urgency R15 2    4  Pelvic floor weakness N81 89    5  Midline cystocele N81 11    6  Rectocele N81 6        Start Time: 9994  Stop Time: 1000  Total time in clinic (min): 50 minutes    Assessment  Assessment details: The patient is a 62year old female with complaints of urinary urgency and leakage as well as fecal urgency and leakage  She has a history of Tarlov cysts with surgery to remove these on 2019   She also had a laminectomy during this same surgery  She reports that since the surgery her symptoms related to her prolapse have been worse, especially since she also reports loss of sensation in the perineal region since surgery  She does have a visible cystocele with visual examination of the perineum and her pessary is in place  There is further descent with cue to bear down  She does have good awareness of her pelvic floor muscles at this time  She is able to isolate and contract her pelvic floor muscles with fair strength  She does have reported numbness to light touch with Q-tip on the left S2-S3-S4 dermatomes  She would benefit from pelvic floor therapy to help reduce/manage her symptoms, address her impairments, and maximize her pain free function upon discharge  She will be given HEP throughout episode of care  Thank you for the referral    Impairments: abnormal coordination, abnormal gait, abnormal muscle tone, activity intolerance, impaired physical strength, lacks appropriate home exercise program, pain with function and poor body mechanics  Understanding of Dx/Px/POC: good   Prognosis: good    Goals  ST  The patient will improve pelvic floor muscle strength 1 grade in 8 weeks  2  The patient will improve pelvic floor muscle endurance to 3 to 5 seconds in 8 weeks  3  The patient will reduce pain by 25% in 8 weeks  LT  The patient will improve pelvic floor muscle awareness and sensation upon discharge  2  The patient will reduce frequency of fecal urgency by 50% upon discharge  3  The patient will reduce stress urinary incontinence by 50% frequency upon discharge       Plan  Patient would benefit from: skilled PT  Planned modality interventions: biofeedback and ultrasound (Real Time Ultrasound)  Planned therapy interventions: manual therapy, neuromuscular re-education, patient education, strengthening, therapeutic exercise, therapeutic training, home exercise program, abdominal trunk stabilization, self care, postural training, therapeutic activities and stretching  Frequency: 1x week  Duration in visits: 8  Duration in weeks: 8  Plan of Care beginning date: 2/4/2020  Plan of Care expiration date: 3/31/2020  Treatment plan discussed with: patient        PT Pelvic Floor Subjective:   History of Present Illness: The patient notes that she was having symptoms of a cystocele and rectocele since 2015  She notes that this was right around when she was determined to be post menopausal  She was diagnosed with Tarlov cysts and had surgery on August 27th 2019  She notes that since the surgery she has less feeling in her perineal region as well as less control of her bowel and bladder  She did follow up with her neurosurgeon once since surgery  She will follow up again in 6 months if she continues to feel that she has not made any progress in regards to her symptoms  She also notes that she saw her GYN, Dr Josefina Price, who referred her to a Urogynecologist  She saw Dr Johan Blanchard in regards to her symptoms and her recommended surgery to help correct her symptoms  The patient elected to not have surgery at this time and he referred her to pelvic floor therapy  She will follow up with Dr Johan Blanchard to have a cystoscopy performed and then a general follow up in July of 2020  The patient reports that her prolapse is just at the edge of her vagina without her pessary  She reports that she is unable to feel this  She attempted to perform Qi Xenia, but felt that she had no response from her muscles  She does feel that she has some sensation on the right compared to the left  She notes that the two larger Tarlov cysts that were removed were on the left side (S2-S3)   Date of surgery: 8/27/2019  Mechanism of injury: surgery    Tarlov lumbosacral Cysts removed with a laminectomy from L5 - S4  Social Support:     Lives with:  Spouse    Relationship status: /committed    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[employer])    Depression: none  Hand dominance:  Left  Diet and Exercise:    Diet:balanced nutrition    Exercise type: walking    Exercise frequency: daily    Since surgery      Not exercising due to: pain  OB/ gyn History    Gestational History:     Number of prior pregnancies: 4    Number of term pregnancies: 4    Delivery Type: vaginal delivery      Number of vaginal deliveries: 4    Delivery Complications:  Laparoscopy due to heavy periods - some evidence of ENDOMETRIOSIS    First child was born in 80 - 30, 32 22 19years old  1st child partial placenta previa with abruption - delivered at 33 weeks; perineal tear  2nd child - labor at 29 weeks and delivered at 42 weeks with episiotomy  Last two pregnancies patient was high risk and on bed rest after 32; delivered at 39 and 38 weeks - episiotomies for both  No epidurals    Menstrual History:      Menopausal: menopause (last MS in 2014)  no hormone replacement therapy  Bladder Function:     Voiding Difficulties positive for: urgency, straining (on occasion) and incomplete emptying      Voiding Difficulties negative for: frequent urination      Voiding Difficulties comments:     Voiding frequency: every 1-2 hours and every 3-4 hours    Urinary leakage: urine leakage    Urinary leakage aggravated by: coughing, sneezing, bending (lifting), exercise (walking), standing up, walking to the bathroom (with an urge) and unknown (randomly)    Nocturia (episodes per night): 1 and 2    Painful urination: No      Fluid Intake Type: Water    Intake (ounces): Intake (ounces) comment: Mostly water - 5 - 6 cups (6 oz) per day  1 cup of coffee in the morning  Protein shake (vegan) during the day  Incontinence Management:     Pads/Diaper Use:  24 hours    Pads/Diapers Additional Comments: light pad   Bowel Function:     Voiding DIfficulties: urgency and unfinished feeling after defecating      Bowel Function comments:   Bowel Movements usually in the morning; multiple small bowel movements until completely empty  Stool is soft  Patient notices very little control when her stools are loose - maybe 15 seconds to get to the bathroom with urge    Constipation is rare - but stool that is too well formed causing pain and stinging in tailbone area  Loose stools not often  Vaginal splinting at times when stool is too hard; patient feels hard bulge    Bowel frequency: daily and multiple times a day  Sexual Function:     Sexually Active:  Sexually active    Pain during intercourse: No (not painful, but not pleasurable; patient has diminished feeling)      pain does not cause abstinence  Pain:     Current pain ratin    At best pain rating:  3    At worst pain ratin    Location:  Right lower quadrant radiates into the groin and right lower back    Quality:  Dull ache and numbness    Aggravating factors:   Bowel movements and intercourse (activity)    Duration of symptoms:  Does not go away    Progression:  Worsening      Objective   Pelvic Floor Exam     General Perineum Exam:   Positive for descent, gaping introitus and no pelvic organ prolapse at rest    Negative for swelling, lesion, rectal irritation, hemorrhoids and perianal erythema    General perineum exam comments: Pelvic floor verbal consent and written consent signed and in chart  Patient deferred second person in room: YES/NO    Education provided today: 10 min  Time Spent on Patient Education:  Pelvic floor anatomy and function  Physiology/relationship of abdominal canister and pelvis/pelvic organs/pelvic floor muscles  Diaphragm and Diaphragmatic breathing  Bowel and Bladder anatomy and function  Importance of body mechanics and ergonomics in regards to protecting against activities which increase IAP and pressure  Toileting posture and body mechanics  PT exam and course of treatment    Further assessment to be performed NV    Visual Inspection of Perineum:   Excursion of perineal body in caudal direction with relaxation of pelvic floor muscles (PFM): weak  Sensation: diminished    Pelvic Organ Prolapse   At rest: none  With bearing down: severe (1 cm beyond the level of hymenal remnants)  Location: anterior and posterior    Pelvic Floor Muscle Exam     Palpation   No increased muscle tension in the bulbospongiosus, ischiocavernosus, super transverse perineal and puborectalis  No tenderness on right in the bulbospongiosus, ischiocavernosus, super transverse perineal and puborectalis  No tenderness on left in the bulbospongiosus, ischiocavernosus, super transverse perineal and puborectalis    Muscle Contraction: well isolated  Breathing pattern with contraction: within normal limits  Pelvic floor muscle relaxation is complete       PERFECT Score   Power right: 2/5  Power left: 2/5        Flowsheet Rows      Most Recent Value   PT/OT G-Codes   Assessment Type  Evaluation   G code set  Self-Care   Self Care Current Status ()  CK   Self Care Goal Status ()  CK           Precautions: neurosurgery to remove Tarlov cysts in August 2019; pelvic organ prolapse; fall risk  Daily Treatment Diary     Manual  2/4                                                                                 Exercise Diary  2/4            PFMC: quick flicks 4"QJZYK/8"IFJP             PFMC: slow holds 5"work/10"rest             PFMC: elevators             TA ADIM             TA ADIM + PFMC             TA ADIM + PFMC + hip add isometrics             TA ADIM + PFMC + hip abd isometrics             TA + PFMC in quadruped             Seated PFMC             Pball seated [address]             PFMC with sit to stand             [address] with squat             PFMC with step ups                                                                                                            Modalities

## 2020-02-17 ENCOUNTER — OFFICE VISIT (OUTPATIENT)
Dept: PHYSICAL THERAPY | Facility: REHABILITATION | Age: 60
End: 2020-02-17
Payer: COMMERCIAL

## 2020-02-17 DIAGNOSIS — N81.6 RECTOCELE: ICD-10-CM

## 2020-02-17 DIAGNOSIS — N81.84 PELVIC MUSCLE WASTING: Primary | ICD-10-CM

## 2020-02-17 DIAGNOSIS — N81.89 PELVIC FLOOR WEAKNESS: ICD-10-CM

## 2020-02-17 DIAGNOSIS — N81.11 MIDLINE CYSTOCELE: ICD-10-CM

## 2020-02-17 DIAGNOSIS — R15.2 FECAL URGENCY: ICD-10-CM

## 2020-02-17 DIAGNOSIS — N39.3 SUI (STRESS URINARY INCONTINENCE, FEMALE): ICD-10-CM

## 2020-02-17 PROCEDURE — 97112 NEUROMUSCULAR REEDUCATION: CPT | Performed by: PHYSICAL THERAPIST

## 2020-02-17 NOTE — PROGRESS NOTES
Daily Note     Today's date: 2020  Patient name: Maame Bauman  : 1960  MRN: [id number]  Referring provider: Nelia Bell MD  Dx:   Encounter Diagnosis     ICD-10-CM    1  Pelvic muscle wasting N81 84    2  DAYRON (stress urinary incontinence, female) N39 3    3  Fecal urgency R15 2    4  Pelvic floor weakness N81 89    5  Midline cystocele N81 11    6  Rectocele N81 6        Start Time: 1500  Stop Time: 1540  Total time in clinic (min): 40 minutes    Subjective: The patient has nothing new to report today since her initial evaluation  She presents for her first treatment session since IE  Objective: See treatment diary below      Assessment: Tolerated treatment well  Patient demonstrated good recruitment on Biofeedback today  She had a little difficulty with releasing her pelvic floor contractions immediately to full rest  Instructed patient in submaximal pelvic floor contractions today  She notes some pressure/discomfort at a higher intensity  She did have limited endurance with slow hold contractions  She was given HEP for home  She was also instructed in bowel and bladder diary as well  She did have elevation of the perineum noted at the anus but limited endurance noted  Plan: Continue per plan of care        Precautions: neurosurgery to remove Tarlov cysts in 2019; pelvic organ prolapse; fall risk  Daily Treatment Diary     Manual                                                                                  Exercise Diary             PFMC: quick flicks 5"MSPQQ/1"DWTR  2x10           PFMC: slow holds 5"work/10"rest  2x10           PFMC: elevators             TA ADIM             TA ADIM + PFMC             TA ADIM + PFMC + hip add isometrics             TA ADIM + PFMC + hip abd isometrics             TA + PFMC in quadruped             Seated PFMC             Pball seated [address]             PFMC with sit to stand             [address] with squat             PFMC with step ups Modalities

## 2020-02-24 ENCOUNTER — APPOINTMENT (OUTPATIENT)
Dept: PHYSICAL THERAPY | Facility: REHABILITATION | Age: 60
End: 2020-02-24
Payer: COMMERCIAL

## 2020-03-02 ENCOUNTER — OFFICE VISIT (OUTPATIENT)
Dept: PHYSICAL THERAPY | Facility: REHABILITATION | Age: 60
End: 2020-03-02
Payer: COMMERCIAL

## 2020-03-02 DIAGNOSIS — N81.89 PELVIC FLOOR WEAKNESS: ICD-10-CM

## 2020-03-02 DIAGNOSIS — R15.2 FECAL URGENCY: ICD-10-CM

## 2020-03-02 DIAGNOSIS — N81.11 MIDLINE CYSTOCELE: ICD-10-CM

## 2020-03-02 DIAGNOSIS — N81.84 PELVIC MUSCLE WASTING: Primary | ICD-10-CM

## 2020-03-02 DIAGNOSIS — N81.6 RECTOCELE: ICD-10-CM

## 2020-03-02 DIAGNOSIS — N39.3 SUI (STRESS URINARY INCONTINENCE, FEMALE): ICD-10-CM

## 2020-03-02 PROCEDURE — 97530 THERAPEUTIC ACTIVITIES: CPT | Performed by: PHYSICAL THERAPIST

## 2020-03-02 PROCEDURE — 97112 NEUROMUSCULAR REEDUCATION: CPT | Performed by: PHYSICAL THERAPIST

## 2020-03-02 NOTE — PROGRESS NOTES
Daily Note     Today's date: 3/2/2020  Patient name: Fernando Tran  : 1960  MRN: [id number]  Referring provider: German Gonzales MD  Dx:   Encounter Diagnosis     ICD-10-CM    1  Pelvic muscle wasting N81 84    2  DAYRON (stress urinary incontinence, female) N39 3    3  Fecal urgency R15 2    4  Pelvic floor weakness N81 89    5  Midline cystocele N81 11    6  Rectocele N81 6        Start Time: 0900  Stop Time: 1000  Total time in clinic (min): 60 minutes    Subjective: The patient was away last week and recently returned  She was only able to fill out 1-2 days of her bladder diary but did note that they are pretty consistent  She had 8 voids in 1 day an 1 overnight  Her leakage episodes were related to activity  She does report having a fair distance to the bathroom at work and most of the time is not aware of urges until standing up  Objective: See treatment diary below      Assessment: Tolerated treatment well  Patient is able to recruit her pelvic floor muscles well on Biofeedback today  She was instructed to perform at submaximal level to avoid pain/irritation  She reports more of an awareness towards the anus  Had the patient perform active engagement in sitting and she was able to do this but with less recruitment and awareness  Had her perform small APT with cueing for even more submaximal engagement with [facility] to help activate first layer pelvic floor muscles  She notes some mild pulling initially in tailbone but that this improved if she backed off intensity more  Recommended quality over quantity in regards to practicing contractions at home  Also recommended to practice functionally such as with sit to stand and getting into her car as this is when she experiences leakage  Plan: Continue per plan of care        Precautions: neurosurgery to remove Tarlov cysts in 2019; pelvic organ prolapse; fall risk  Daily Treatment Diary     Manual  2/4 2/17 3/2 Exercise Diary  2/4 2/17 3/2          PFMC: quick flicks 7"TMEPK/0"KZMB  2x10           PFMC: slow holds 5"work/10"rest  2x10           PFMC: elevators             TA ADIM             TA ADIM + PFMC             TA ADIM + PFMC + hip add isometrics             TA ADIM + PFMC + hip abd isometrics             TA + PFMC in quadruped             Seated PFMC             Pball seated PFMC             PFMC with sit to stand             [address] with squat             PFMC with step ups                                                                                                            Modalities

## 2020-03-09 ENCOUNTER — OFFICE VISIT (OUTPATIENT)
Dept: PHYSICAL THERAPY | Facility: REHABILITATION | Age: 60
End: 2020-03-09
Payer: COMMERCIAL

## 2020-03-09 DIAGNOSIS — N39.3 SUI (STRESS URINARY INCONTINENCE, FEMALE): ICD-10-CM

## 2020-03-09 DIAGNOSIS — R15.2 FECAL URGENCY: ICD-10-CM

## 2020-03-09 DIAGNOSIS — N81.6 RECTOCELE: ICD-10-CM

## 2020-03-09 DIAGNOSIS — N81.89 PELVIC FLOOR WEAKNESS: ICD-10-CM

## 2020-03-09 DIAGNOSIS — N81.11 MIDLINE CYSTOCELE: ICD-10-CM

## 2020-03-09 DIAGNOSIS — N81.84 PELVIC MUSCLE WASTING: Primary | ICD-10-CM

## 2020-03-09 PROCEDURE — 97112 NEUROMUSCULAR REEDUCATION: CPT | Performed by: PHYSICAL THERAPIST

## 2020-03-09 PROCEDURE — 97530 THERAPEUTIC ACTIVITIES: CPT | Performed by: PHYSICAL THERAPIST

## 2020-03-09 NOTE — PROGRESS NOTES
Daily Note     Today's date: 3/9/2020  Patient name: Stephania Leo  : 1960  MRN: [id number]  Referring provider: Wilfredo Barnes MD  Dx:   Encounter Diagnosis     ICD-10-CM    1  Pelvic muscle wasting N81 84    2  DAYRON (stress urinary incontinence, female) N39 3    3  Fecal urgency R15 2    4  Pelvic floor weakness N81 89    5  Midline cystocele N81 11    6  Rectocele N81 6        Start Time: 0550  Stop Time: 1000  Total time in clinic (min): 70 minutes    Subjective: The patient notes that she has been having some left SIJ pain and right lateral thigh, posterior thigh, and groin pain intermittently  She reports 5-6/10 pain this morning upon awakening but notes that she was active and on her feet yesterday at home  She has been trying her pelvic floor exercises as she is able  Objective: See treatment diary below      Assessment: Tolerated treatment well  Patient complained of intermittent mild spasms around tailbone and some bladder pain which were not constant, during treatment  She did demonstrate good coordination on Biofeedback with full ability to release her pelvic floor muscles <3 0 mV consistently each time in between contractions with increased rest time in between contractions for muscle recovery  Patient also cued to perform contractions at a submaximal level to avoid pain/strain  Practiced reaching to a lower surface and a higher surface (cabinet and stool) with cues for breathing to control IAP and coordination of pelvic floor muscle contraction for support  Plan: Continue per plan of care        Precautions: neurosurgery to remove Tarlov cysts in 2019; pelvic organ prolapse; fall risk  Daily Treatment Diary     Manual  2/4 2/17 3/2 3/9                                                                              Exercise Diary  2/4 2/17 3/2 3/9         PFMC: quick flicks 2"MCOVV/8"VJHJ  2x10  10x         PFMC: slow holds 5"work/10"rest  2x10  10x         PFMC: elevators TA ADIM             TA ADIM + PFMC             TA ADIM + PFMC + hip add isometrics             TA ADIM + PFMC + hip abd isometrics             TA + PFMC in quadruped             Seated PFMC             Pball seated PFMC             PFMC with sit to stand    5x         PFMC with reaching overhead    [facility]    Stool with cone   5x                                                                                                        Modalities

## 2020-03-16 ENCOUNTER — OFFICE VISIT (OUTPATIENT)
Dept: PHYSICAL THERAPY | Facility: REHABILITATION | Age: 60
End: 2020-03-16
Payer: COMMERCIAL

## 2020-03-16 DIAGNOSIS — N81.84 PELVIC MUSCLE WASTING: Primary | ICD-10-CM

## 2020-03-16 DIAGNOSIS — N81.89 PELVIC FLOOR WEAKNESS: ICD-10-CM

## 2020-03-16 DIAGNOSIS — N81.6 RECTOCELE: ICD-10-CM

## 2020-03-16 DIAGNOSIS — N39.3 SUI (STRESS URINARY INCONTINENCE, FEMALE): ICD-10-CM

## 2020-03-16 DIAGNOSIS — R15.2 FECAL URGENCY: ICD-10-CM

## 2020-03-16 PROCEDURE — 97112 NEUROMUSCULAR REEDUCATION: CPT | Performed by: PHYSICAL THERAPIST

## 2020-03-16 PROCEDURE — 97530 THERAPEUTIC ACTIVITIES: CPT | Performed by: PHYSICAL THERAPIST

## 2020-03-16 NOTE — PROGRESS NOTES
Daily Note     Today's date: 3/16/2020  Patient name: Bereket Lim  : 1960  MRN: [id number]  Referring provider: Nilo Bruce MD  Dx:   Encounter Diagnosis     ICD-10-CM    1  Pelvic muscle wasting N81 84    2  DAYRON (stress urinary incontinence, female) N39 3    3  Fecal urgency R15 2    4  Pelvic floor weakness N81 89    5  Rectocele N81 6        Start Time: 08  Stop Time:   Total time in clinic (min): 60 minutes    Subjective: The patient notes that she is seeing some improvements in her pelvic support as she is practicing her Kegels functionally for support  She does feel that her pain has been getting worse  She notes that she sees the Urogynecologist next week and is going to inquire about pelvic congestion as she has been noticing more varicose veins in her legs  She describes her pain as a deep ache in the right lower abdominal region which could extend into the right groin  She notes that her tailbone is sore and she feels some pressure  She notes that her prolapse symptoms do not seem to be improved  Her bowel and bladder control do seem to be a litlte but improved  She continues to experience leakage of urine but she is able to catch it sometimes and stop it by beto her pelvic floor muscles  She notes that her fecal urgency has only improved minimally maybe about 5 seconds more time to make it to the restroom  Objective: See treatment diary below      Assessment: Tolerated treatment well  Patient demonstrated pelvic floor resting muscle tone between 3 2 - 4 0 mV at rest pre treatment  She had some difficulty releasing/relaxing her pelvic floor muscles even with guided body scan with active relaxation  She is able to engage her muscles submaximally without pain  She is able to focus more globally than rectally as this is where she feels some pressure around her coccyx  She also is able to fully release to her baseline resting rate   Education provided today in regards to posture, gait, and general guarding as all of these things contribute to her pain as well as pelvic pressure  Perineal exam performed and prolapse visible at the level of the hymenal remnants at rest  Patient does feel that this is worse since before surgery  Explained that she should avoid actvities that increase her intra abdominal pressure and watch he she is breathing that she is not holding her breath as she is moving/transitioning positions  Also explained the balance between being able to engage her muscles for support but not clenching/guarding which puts increased pressure on her back and sacrum  She does demonstrate good coordination with breathing during slow hold contractions without over beto/engaging her abdominal muscles  Re-evaluate next visit  Plan: Continue per plan of care        Precautions: neurosurgery to remove Tarlov cysts in August 2019; pelvic organ prolapse; fall risk  Daily Treatment Diary     Manual  2/4 2/17 3/2 3/9 3/16                                                            Education/discussion     30 min            Exercise Diary  2/4 2/17 3/2 3/9 2/16        PFMC: quick flicks 6"OGUKZ/4"MXKP  2x10  10x         PFMC: slow holds 5"work/10"rest  2x10  10x 8x        PFMC: elevators             TA ADIM             TA ADIM + PFMC             TA ADIM + PFMC + hip add isometrics             TA ADIM + PFMC + hip abd isometrics             TA + PFMC in quadruped             Seated PFMC             Pball seated [address]             PFMC with sit to stand    5x         PFMC with reaching overhead    5x cone in cabinet         [address] with reaching low    Stool with cone   5x                      Diaphragmatic breathing with body scan     10 min                                                                             Modalities

## 2020-03-23 ENCOUNTER — APPOINTMENT (OUTPATIENT)
Dept: PHYSICAL THERAPY | Facility: REHABILITATION | Age: 60
End: 2020-03-23
Payer: COMMERCIAL

## 2020-04-15 ENCOUNTER — APPOINTMENT (OUTPATIENT)
Dept: LAB | Facility: MEDICAL CENTER | Age: 60
End: 2020-04-15
Payer: COMMERCIAL

## 2020-04-15 ENCOUNTER — TRANSCRIBE ORDERS (OUTPATIENT)
Dept: ADMINISTRATIVE | Facility: HOSPITAL | Age: 60
End: 2020-04-15

## 2020-04-15 DIAGNOSIS — N39.0 URINARY TRACT INFECTION WITHOUT HEMATURIA, SITE UNSPECIFIED: Primary | ICD-10-CM

## 2020-04-15 PROCEDURE — 87086 URINE CULTURE/COLONY COUNT: CPT | Performed by: OBSTETRICS & GYNECOLOGY

## 2020-04-16 LAB — BACTERIA UR CULT: NORMAL

## 2020-04-23 ENCOUNTER — APPOINTMENT (OUTPATIENT)
Dept: LAB | Facility: MEDICAL CENTER | Age: 60
End: 2020-04-23
Payer: COMMERCIAL

## 2020-04-23 ENCOUNTER — TRANSCRIBE ORDERS (OUTPATIENT)
Dept: ADMINISTRATIVE | Facility: HOSPITAL | Age: 60
End: 2020-04-23

## 2020-04-23 DIAGNOSIS — R23.3 SPONTANEOUS ECCHYMOSES: ICD-10-CM

## 2020-04-23 DIAGNOSIS — IMO0002 VITAMIN DISEASE: ICD-10-CM

## 2020-04-23 DIAGNOSIS — G61.9 INFLAMMATORY NEUROPATHY (HCC): ICD-10-CM

## 2020-04-23 DIAGNOSIS — H93.19 SUBJECTIVE TINNITUS, UNSPECIFIED LATERALITY: ICD-10-CM

## 2020-04-23 DIAGNOSIS — M25.50 PAIN IN JOINT, MULTIPLE SITES: ICD-10-CM

## 2020-04-23 DIAGNOSIS — M25.50 PAIN IN JOINT, MULTIPLE SITES: Primary | ICD-10-CM

## 2020-04-23 LAB
25(OH)D3 SERPL-MCNC: 31.1 NG/ML (ref 30–100)
ALBUMIN SERPL BCP-MCNC: 3.6 G/DL (ref 3.5–5)
ALP SERPL-CCNC: 57 U/L (ref 46–116)
ALT SERPL W P-5'-P-CCNC: 35 U/L (ref 12–78)
ANION GAP SERPL CALCULATED.3IONS-SCNC: 4 MMOL/L (ref 4–13)
AST SERPL W P-5'-P-CCNC: 23 U/L (ref 5–45)
BASOPHILS # BLD AUTO: 0.05 THOUSANDS/ΜL (ref 0–0.1)
BASOPHILS NFR BLD AUTO: 1 % (ref 0–1)
BILIRUB SERPL-MCNC: 0.48 MG/DL (ref 0.2–1)
BUN SERPL-MCNC: 24 MG/DL (ref 5–25)
CALCIUM SERPL-MCNC: 9.1 MG/DL (ref 8.3–10.1)
CHLORIDE SERPL-SCNC: 107 MMOL/L (ref 100–108)
CHOLEST SERPL-MCNC: 191 MG/DL (ref 50–200)
CO2 SERPL-SCNC: 28 MMOL/L (ref 21–32)
CREAT SERPL-MCNC: 0.78 MG/DL (ref 0.6–1.3)
EOSINOPHIL # BLD AUTO: 0.26 THOUSAND/ΜL (ref 0–0.61)
EOSINOPHIL NFR BLD AUTO: 4 % (ref 0–6)
ERYTHROCYTE [DISTWIDTH] IN BLOOD BY AUTOMATED COUNT: 13.7 % (ref 11.6–15.1)
FOLATE SERPL-MCNC: >20 NG/ML (ref 3.1–17.5)
GFR SERPL CREATININE-BSD FRML MDRD: 82 ML/MIN/1.73SQ M
GLUCOSE P FAST SERPL-MCNC: 86 MG/DL (ref 65–99)
HCT VFR BLD AUTO: 39.3 % (ref 34.8–46.1)
HDLC SERPL-MCNC: 80 MG/DL
HGB BLD-MCNC: 12.6 G/DL (ref 11.5–15.4)
IMM GRANULOCYTES # BLD AUTO: 0.01 THOUSAND/UL (ref 0–0.2)
IMM GRANULOCYTES NFR BLD AUTO: 0 % (ref 0–2)
LDLC SERPL CALC-MCNC: 94 MG/DL (ref 0–100)
LYMPHOCYTES # BLD AUTO: 2.4 THOUSANDS/ΜL (ref 0.6–4.47)
LYMPHOCYTES NFR BLD AUTO: 38 % (ref 14–44)
MCH RBC QN AUTO: 30.1 PG (ref 26.8–34.3)
MCHC RBC AUTO-ENTMCNC: 32.1 G/DL (ref 31.4–37.4)
MCV RBC AUTO: 94 FL (ref 82–98)
MONOCYTES # BLD AUTO: 0.49 THOUSAND/ΜL (ref 0.17–1.22)
MONOCYTES NFR BLD AUTO: 8 % (ref 4–12)
NEUTROPHILS # BLD AUTO: 3.04 THOUSANDS/ΜL (ref 1.85–7.62)
NEUTS SEG NFR BLD AUTO: 49 % (ref 43–75)
NONHDLC SERPL-MCNC: 111 MG/DL
NRBC BLD AUTO-RTO: 0 /100 WBCS
PLATELET # BLD AUTO: 273 THOUSANDS/UL (ref 149–390)
PMV BLD AUTO: 9.7 FL (ref 8.9–12.7)
POTASSIUM SERPL-SCNC: 4 MMOL/L (ref 3.5–5.3)
PROT SERPL-MCNC: 7.4 G/DL (ref 6.4–8.2)
RBC # BLD AUTO: 4.19 MILLION/UL (ref 3.81–5.12)
SODIUM SERPL-SCNC: 139 MMOL/L (ref 136–145)
T3 SERPL-MCNC: 1.3 NG/ML (ref 0.6–1.8)
T3FREE SERPL-MCNC: 2.89 PG/ML (ref 2.3–4.2)
T4 FREE SERPL-MCNC: 0.97 NG/DL (ref 0.76–1.46)
T4 SERPL-MCNC: 8.8 UG/DL (ref 4.7–13.3)
TRIGL SERPL-MCNC: 87 MG/DL
TSH SERPL DL<=0.05 MIU/L-ACNC: 3.27 UIU/ML (ref 0.36–3.74)
URATE SERPL-MCNC: 5.3 MG/DL (ref 2–6.8)
VIT B12 SERPL-MCNC: 856 PG/ML (ref 100–900)
WBC # BLD AUTO: 6.25 THOUSAND/UL (ref 4.31–10.16)

## 2020-04-23 PROCEDURE — 84480 ASSAY TRIIODOTHYRONINE (T3): CPT

## 2020-04-23 PROCEDURE — 80053 COMPREHEN METABOLIC PANEL: CPT | Performed by: NURSE PRACTITIONER

## 2020-04-23 PROCEDURE — 80061 LIPID PANEL: CPT | Performed by: NURSE PRACTITIONER

## 2020-04-23 PROCEDURE — 82607 VITAMIN B-12: CPT | Performed by: NURSE PRACTITIONER

## 2020-04-23 PROCEDURE — 86618 LYME DISEASE ANTIBODY: CPT | Performed by: NURSE PRACTITIONER

## 2020-04-23 PROCEDURE — 85025 COMPLETE CBC W/AUTO DIFF WBC: CPT | Performed by: NURSE PRACTITIONER

## 2020-04-23 PROCEDURE — 82746 ASSAY OF FOLIC ACID SERUM: CPT

## 2020-04-23 PROCEDURE — 86753 PROTOZOA ANTIBODY NOS: CPT

## 2020-04-23 PROCEDURE — 36415 COLL VENOUS BLD VENIPUNCTURE: CPT | Performed by: NURSE PRACTITIONER

## 2020-04-23 PROCEDURE — 86376 MICROSOMAL ANTIBODY EACH: CPT

## 2020-04-23 PROCEDURE — 84436 ASSAY OF TOTAL THYROXINE: CPT

## 2020-04-23 PROCEDURE — 86038 ANTINUCLEAR ANTIBODIES: CPT | Performed by: NURSE PRACTITIONER

## 2020-04-23 PROCEDURE — 84443 ASSAY THYROID STIM HORMONE: CPT | Performed by: NURSE PRACTITIONER

## 2020-04-23 PROCEDURE — 84481 FREE ASSAY (FT-3): CPT

## 2020-04-23 PROCEDURE — 86800 THYROGLOBULIN ANTIBODY: CPT

## 2020-04-23 PROCEDURE — 82306 VITAMIN D 25 HYDROXY: CPT | Performed by: NURSE PRACTITIONER

## 2020-04-23 PROCEDURE — 86225 DNA ANTIBODY NATIVE: CPT

## 2020-04-23 PROCEDURE — 84439 ASSAY OF FREE THYROXINE: CPT | Performed by: NURSE PRACTITIONER

## 2020-04-23 PROCEDURE — 84482 T3 REVERSE: CPT

## 2020-04-23 PROCEDURE — 84550 ASSAY OF BLOOD/URIC ACID: CPT | Performed by: NURSE PRACTITIONER

## 2020-04-23 PROCEDURE — 86235 NUCLEAR ANTIGEN ANTIBODY: CPT

## 2020-04-24 LAB
B BURGDOR IGG+IGM SER-ACNC: <0.91 ISR (ref 0–0.9)
DSDNA AB SER-ACNC: 2 IU/ML (ref 0–9)
ENA SCL70 AB SER-ACNC: <0.2 AI (ref 0–0.9)
RYE IGE QN: NEGATIVE
THYROGLOB AB SERPL-ACNC: <1 IU/ML (ref 0–0.9)
THYROPEROXIDASE AB SERPL-ACNC: <9 IU/ML (ref 0–34)

## 2020-04-26 LAB — T3REVERSE SERPL-MCNC: 20.8 NG/DL (ref 9.2–24.1)

## 2020-04-27 LAB
B MICROTI IGG TITR SER: NORMAL {TITER}
B MICROTI IGM TITR SER: NORMAL {TITER}

## 2020-05-26 NOTE — PROGRESS NOTES
PT Discharge    Today's date: 2020  Patient name: Jose Newman  : 1960  MRN: [id number]  Referring provider: Jarad William MD  Dx:   Encounter Diagnosis     ICD-10-CM    1  Pelvic muscle wasting N81 84    2  DAYRON (stress urinary incontinence, female) N39 3    3  Fecal urgency R15 2    4  Pelvic floor weakness N81 89    5  Rectocele N81 6        Start Time: 08  Stop Time: 1104  Total time in clinic (min): 60 minutes    Assessment/Plan: The patient was treated for a total of 5 visits including IE on   She was last treated on 3/16  The patient called to cancel all remaining appointments due to [place] outbreak  The patient will call to schedule in future if necessary when ready to return to physical therapy       Pelvic Floor Subjective     Objective

## 2020-07-23 ENCOUNTER — TRANSCRIBE ORDERS (OUTPATIENT)
Dept: ADMINISTRATIVE | Facility: HOSPITAL | Age: 60
End: 2020-07-23

## 2020-07-23 DIAGNOSIS — N95.0 POSTMENOPAUSAL BLEEDING: Primary | ICD-10-CM

## 2020-08-13 ENCOUNTER — HOSPITAL ENCOUNTER (OUTPATIENT)
Dept: ULTRASOUND IMAGING | Facility: HOSPITAL | Age: 60
Discharge: HOME | End: 2020-08-13
Attending: OBSTETRICS & GYNECOLOGY
Payer: COMMERCIAL

## 2020-08-13 DIAGNOSIS — N95.0 POSTMENOPAUSAL BLEEDING: ICD-10-CM

## 2020-08-13 PROCEDURE — 76856 US EXAM PELVIC COMPLETE: CPT

## 2020-08-13 PROCEDURE — 76830 TRANSVAGINAL US NON-OB: CPT

## 2020-09-18 ENCOUNTER — APPOINTMENT (OUTPATIENT)
Dept: LAB | Facility: MEDICAL CENTER | Age: 60
End: 2020-09-18
Payer: COMMERCIAL

## 2020-09-18 ENCOUNTER — TRANSCRIBE ORDERS (OUTPATIENT)
Dept: ADMINISTRATIVE | Facility: HOSPITAL | Age: 60
End: 2020-09-18

## 2020-09-18 DIAGNOSIS — N81.2 UTEROVAGINAL PROLAPSE, INCOMPLETE: Primary | ICD-10-CM

## 2020-09-18 DIAGNOSIS — N81.2 UTEROVAGINAL PROLAPSE, INCOMPLETE: ICD-10-CM

## 2020-09-18 LAB
ANION GAP SERPL CALCULATED.3IONS-SCNC: 6 MMOL/L (ref 4–13)
BUN SERPL-MCNC: 22 MG/DL (ref 5–25)
CALCIUM SERPL-MCNC: 9.5 MG/DL (ref 8.3–10.1)
CHLORIDE SERPL-SCNC: 105 MMOL/L (ref 100–108)
CO2 SERPL-SCNC: 29 MMOL/L (ref 21–32)
CREAT SERPL-MCNC: 0.72 MG/DL (ref 0.6–1.3)
ERYTHROCYTE [DISTWIDTH] IN BLOOD BY AUTOMATED COUNT: 13 % (ref 11.6–15.1)
GFR SERPL CREATININE-BSD FRML MDRD: 90 ML/MIN/1.73SQ M
GLUCOSE SERPL-MCNC: 85 MG/DL (ref 65–140)
HCT VFR BLD AUTO: 41.2 % (ref 34.8–46.1)
HGB BLD-MCNC: 13.1 G/DL (ref 11.5–15.4)
MCH RBC QN AUTO: 31 PG (ref 26.8–34.3)
MCHC RBC AUTO-ENTMCNC: 31.8 G/DL (ref 31.4–37.4)
MCV RBC AUTO: 98 FL (ref 82–98)
PLATELET # BLD AUTO: 308 THOUSANDS/UL (ref 149–390)
PMV BLD AUTO: 9.8 FL (ref 8.9–12.7)
POTASSIUM SERPL-SCNC: 4.7 MMOL/L (ref 3.5–5.3)
RBC # BLD AUTO: 4.22 MILLION/UL (ref 3.81–5.12)
SODIUM SERPL-SCNC: 140 MMOL/L (ref 136–145)
WBC # BLD AUTO: 6.59 THOUSAND/UL (ref 4.31–10.16)

## 2020-09-18 PROCEDURE — 85027 COMPLETE CBC AUTOMATED: CPT

## 2020-09-18 PROCEDURE — 36415 COLL VENOUS BLD VENIPUNCTURE: CPT

## 2020-09-18 PROCEDURE — 80048 BASIC METABOLIC PNL TOTAL CA: CPT

## 2020-10-28 RX ORDER — ASCORBIC ACID 500 MG
500 TABLET ORAL DAILY
COMMUNITY

## 2020-10-28 RX ORDER — CHOLECALCIFEROL (VITAMIN D3) 1MM UNIT/G
9000 LIQUID (GRAM) MISCELLANEOUS DAILY
COMMUNITY

## 2020-10-29 ENCOUNTER — ANESTHESIA EVENT (OUTPATIENT)
Dept: PERIOP | Facility: HOSPITAL | Age: 60
End: 2020-10-29

## 2020-10-29 ENCOUNTER — HOSPITAL ENCOUNTER (OUTPATIENT)
Facility: HOSPITAL | Age: 60
Setting detail: OUTPATIENT SURGERY
Discharge: HOME | End: 2020-10-30
Attending: OBSTETRICS & GYNECOLOGY | Admitting: OBSTETRICS & GYNECOLOGY

## 2020-10-29 ENCOUNTER — ANESTHESIA (OUTPATIENT)
Dept: PERIOP | Facility: HOSPITAL | Age: 60
End: 2020-10-29

## 2020-10-29 VITALS — HEART RATE: 80 BPM

## 2020-10-29 DIAGNOSIS — N81.2 INCOMPLETE UTEROVAGINAL PROLAPSE: Primary | ICD-10-CM

## 2020-10-29 PROCEDURE — C2631 REP DEV, URINARY, W/O SLING: HCPCS | Performed by: OBSTETRICS & GYNECOLOGY

## 2020-10-29 PROCEDURE — C1771 REP DEV, URINARY, W/SLING: HCPCS | Performed by: OBSTETRICS & GYNECOLOGY

## 2020-10-29 DEVICE — CONTINENCE SYSTEM
Type: IMPLANTABLE DEVICE | Site: VAGINA | Status: FUNCTIONAL
Brand: GYNECARE TVT ABBREVO

## 2020-10-29 RX ORDER — LIDOCAINE HYDROCHLORIDE 20 MG/ML
INJECTION, SOLUTION EPIDURAL; INFILTRATION; INTRACAUDAL; PERINEURAL AS NEEDED
Status: DISCONTINUED | OUTPATIENT
Start: 2020-10-29 | End: 2020-10-29

## 2020-10-29 RX ORDER — MAGNESIUM HYDROXIDE 1200 MG/15ML
LIQUID ORAL AS NEEDED
Status: DISCONTINUED | OUTPATIENT
Start: 2020-10-29 | End: 2020-10-29 | Stop reason: HOSPADM

## 2020-10-29 RX ORDER — ONDANSETRON 2 MG/ML
4 INJECTION INTRAMUSCULAR; INTRAVENOUS ONCE AS NEEDED
Status: DISCONTINUED | OUTPATIENT
Start: 2020-10-29 | End: 2020-10-29 | Stop reason: HOSPADM

## 2020-10-29 RX ORDER — DEXAMETHASONE SODIUM PHOSPHATE 4 MG/ML
INJECTION, SOLUTION INTRA-ARTICULAR; INTRALESIONAL; INTRAMUSCULAR; INTRAVENOUS; SOFT TISSUE AS NEEDED
Status: DISCONTINUED | OUTPATIENT
Start: 2020-10-29 | End: 2020-10-29

## 2020-10-29 RX ORDER — FENTANYL CITRATE 50 UG/ML
INJECTION, SOLUTION INTRAMUSCULAR; INTRAVENOUS AS NEEDED
Status: DISCONTINUED | OUTPATIENT
Start: 2020-10-29 | End: 2020-10-29

## 2020-10-29 RX ORDER — ACETAMINOPHEN 325 MG/1
650 TABLET ORAL EVERY 6 HOURS SCHEDULED
Status: DISCONTINUED | OUTPATIENT
Start: 2020-10-29 | End: 2020-10-30 | Stop reason: HOSPADM

## 2020-10-29 RX ORDER — OXYCODONE HYDROCHLORIDE 5 MG/1
5 TABLET ORAL EVERY 4 HOURS PRN
Status: DISCONTINUED | OUTPATIENT
Start: 2020-10-29 | End: 2020-10-30 | Stop reason: HOSPADM

## 2020-10-29 RX ORDER — NEOSTIGMINE METHYLSULFATE 1 MG/ML
INJECTION INTRAVENOUS AS NEEDED
Status: DISCONTINUED | OUTPATIENT
Start: 2020-10-29 | End: 2020-10-29

## 2020-10-29 RX ORDER — EPHEDRINE SULFATE 50 MG/ML
INJECTION INTRAVENOUS AS NEEDED
Status: DISCONTINUED | OUTPATIENT
Start: 2020-10-29 | End: 2020-10-29

## 2020-10-29 RX ORDER — PHENAZOPYRIDINE HYDROCHLORIDE 100 MG/1
200 TABLET, FILM COATED ORAL ONCE
Status: COMPLETED | OUTPATIENT
Start: 2020-10-29 | End: 2020-10-29

## 2020-10-29 RX ORDER — DIPHENHYDRAMINE HCL 25 MG
25 CAPSULE ORAL EVERY 6 HOURS PRN
COMMUNITY

## 2020-10-29 RX ORDER — HYDROMORPHONE HCL/PF 1 MG/ML
0.5 SYRINGE (ML) INJECTION
Status: DISCONTINUED | OUTPATIENT
Start: 2020-10-29 | End: 2020-10-29 | Stop reason: HOSPADM

## 2020-10-29 RX ORDER — MIDAZOLAM HYDROCHLORIDE 2 MG/2ML
INJECTION, SOLUTION INTRAMUSCULAR; INTRAVENOUS AS NEEDED
Status: DISCONTINUED | OUTPATIENT
Start: 2020-10-29 | End: 2020-10-29

## 2020-10-29 RX ORDER — ONDANSETRON 2 MG/ML
INJECTION INTRAMUSCULAR; INTRAVENOUS AS NEEDED
Status: DISCONTINUED | OUTPATIENT
Start: 2020-10-29 | End: 2020-10-29

## 2020-10-29 RX ORDER — FENTANYL CITRATE/PF 50 MCG/ML
50 SYRINGE (ML) INJECTION
Status: DISCONTINUED | OUTPATIENT
Start: 2020-10-29 | End: 2020-10-29 | Stop reason: HOSPADM

## 2020-10-29 RX ORDER — MEPERIDINE HYDROCHLORIDE 25 MG/ML
12.5 INJECTION INTRAMUSCULAR; INTRAVENOUS; SUBCUTANEOUS ONCE AS NEEDED
Status: DISCONTINUED | OUTPATIENT
Start: 2020-10-29 | End: 2020-10-29 | Stop reason: HOSPADM

## 2020-10-29 RX ORDER — PROPOFOL 10 MG/ML
INJECTION, EMULSION INTRAVENOUS AS NEEDED
Status: DISCONTINUED | OUTPATIENT
Start: 2020-10-29 | End: 2020-10-29

## 2020-10-29 RX ORDER — GLYCOPYRROLATE 0.2 MG/ML
INJECTION INTRAMUSCULAR; INTRAVENOUS AS NEEDED
Status: DISCONTINUED | OUTPATIENT
Start: 2020-10-29 | End: 2020-10-29

## 2020-10-29 RX ORDER — SODIUM CHLORIDE 9 MG/ML
75 INJECTION, SOLUTION INTRAVENOUS CONTINUOUS
Status: DISCONTINUED | OUTPATIENT
Start: 2020-10-29 | End: 2020-10-30 | Stop reason: HOSPADM

## 2020-10-29 RX ORDER — CEFAZOLIN SODIUM 2 G/50ML
2000 SOLUTION INTRAVENOUS ONCE
Status: COMPLETED | OUTPATIENT
Start: 2020-10-29 | End: 2020-10-29

## 2020-10-29 RX ORDER — ONDANSETRON 2 MG/ML
4 INJECTION INTRAMUSCULAR; INTRAVENOUS EVERY 6 HOURS PRN
Status: DISCONTINUED | OUTPATIENT
Start: 2020-10-29 | End: 2020-10-30 | Stop reason: HOSPADM

## 2020-10-29 RX ORDER — SODIUM CHLORIDE 9 MG/ML
125 INJECTION, SOLUTION INTRAVENOUS CONTINUOUS
Status: DISCONTINUED | OUTPATIENT
Start: 2020-10-29 | End: 2020-10-30

## 2020-10-29 RX ORDER — PHENAZOPYRIDINE HYDROCHLORIDE 200 MG/1
200 TABLET, FILM COATED ORAL ONCE
Status: CANCELLED | OUTPATIENT
Start: 2020-10-29

## 2020-10-29 RX ORDER — ROCURONIUM BROMIDE 10 MG/ML
INJECTION, SOLUTION INTRAVENOUS AS NEEDED
Status: DISCONTINUED | OUTPATIENT
Start: 2020-10-29 | End: 2020-10-29

## 2020-10-29 RX ORDER — IBUPROFEN 600 MG/1
600 TABLET ORAL EVERY 6 HOURS SCHEDULED
Status: DISCONTINUED | OUTPATIENT
Start: 2020-10-29 | End: 2020-10-30 | Stop reason: HOSPADM

## 2020-10-29 RX ADMIN — MIDAZOLAM 2 MG: 1 INJECTION INTRAMUSCULAR; INTRAVENOUS at 14:49

## 2020-10-29 RX ADMIN — IBUPROFEN 600 MG: 600 TABLET, FILM COATED ORAL at 22:23

## 2020-10-29 RX ADMIN — PHENAZOPYRIDINE HYDROCHLORIDE 200 MG: 100 TABLET ORAL at 19:58

## 2020-10-29 RX ADMIN — PROPOFOL 200 MG: 10 INJECTION, EMULSION INTRAVENOUS at 14:56

## 2020-10-29 RX ADMIN — FENTANYL CITRATE 50 MCG: 50 INJECTION INTRAMUSCULAR; INTRAVENOUS at 18:18

## 2020-10-29 RX ADMIN — OXYCODONE HYDROCHLORIDE 5 MG: 5 TABLET ORAL at 19:54

## 2020-10-29 RX ADMIN — ONDANSETRON 4 MG: 2 INJECTION INTRAMUSCULAR; INTRAVENOUS at 16:49

## 2020-10-29 RX ADMIN — ACETAMINOPHEN 650 MG: 325 TABLET ORAL at 23:51

## 2020-10-29 RX ADMIN — EPHEDRINE SULFATE 5 MG: 50 INJECTION, SOLUTION INTRAVENOUS at 15:43

## 2020-10-29 RX ADMIN — NEOSTIGMINE METHYLSULFATE 3 MG: 1 INJECTION, SOLUTION INTRAVENOUS at 17:30

## 2020-10-29 RX ADMIN — FENTANYL CITRATE 50 MCG: 50 INJECTION, SOLUTION INTRAMUSCULAR; INTRAVENOUS at 14:56

## 2020-10-29 RX ADMIN — CEFAZOLIN SODIUM 2000 MG: 2 SOLUTION INTRAVENOUS at 14:36

## 2020-10-29 RX ADMIN — EPHEDRINE SULFATE 5 MG: 50 INJECTION, SOLUTION INTRAVENOUS at 15:22

## 2020-10-29 RX ADMIN — FENTANYL CITRATE 50 MCG: 50 INJECTION INTRAMUSCULAR; INTRAVENOUS at 18:03

## 2020-10-29 RX ADMIN — LIDOCAINE HYDROCHLORIDE 100 MG: 20 INJECTION, SOLUTION EPIDURAL; INFILTRATION; INTRACAUDAL; PERINEURAL at 14:56

## 2020-10-29 RX ADMIN — SODIUM CHLORIDE 75 ML/HR: 0.9 INJECTION, SOLUTION INTRAVENOUS at 20:02

## 2020-10-29 RX ADMIN — SODIUM CHLORIDE 125 ML/HR: 0.9 INJECTION, SOLUTION INTRAVENOUS at 13:44

## 2020-10-29 RX ADMIN — GLYCOPYRROLATE 0.6 MG: 0.2 INJECTION, SOLUTION INTRAMUSCULAR; INTRAVENOUS at 17:30

## 2020-10-29 RX ADMIN — ROCURONIUM BROMIDE 30 MG: 10 INJECTION, SOLUTION INTRAVENOUS at 14:56

## 2020-10-29 RX ADMIN — DEXAMETHASONE SODIUM PHOSPHATE 4 MG: 4 INJECTION, SOLUTION INTRAMUSCULAR; INTRAVENOUS at 15:02

## 2020-10-29 RX ADMIN — FENTANYL CITRATE 50 MCG: 50 INJECTION, SOLUTION INTRAMUSCULAR; INTRAVENOUS at 17:02

## 2020-10-29 RX ADMIN — ACETAMINOPHEN 650 MG: 325 TABLET ORAL at 19:58

## 2020-10-29 RX ADMIN — OXYCODONE HYDROCHLORIDE 5 MG: 5 TABLET ORAL at 23:51

## 2020-10-30 VITALS
WEIGHT: 167 LBS | DIASTOLIC BLOOD PRESSURE: 59 MMHG | HEART RATE: 62 BPM | SYSTOLIC BLOOD PRESSURE: 116 MMHG | TEMPERATURE: 98 F | HEIGHT: 68 IN | OXYGEN SATURATION: 100 % | RESPIRATION RATE: 18 BRPM | BODY MASS INDEX: 25.31 KG/M2

## 2020-10-30 PROBLEM — N81.11 CYSTOCELE, MIDLINE: Status: ACTIVE | Noted: 2020-10-30

## 2020-10-30 PROBLEM — N81.83 INCOMPETENCE OR WEAKENING OF RECTOVAGINAL TISSUE: Status: ACTIVE | Noted: 2020-10-30

## 2020-10-30 PROBLEM — N39.3 STRESS INCONTINENCE (FEMALE) (MALE): Status: ACTIVE | Noted: 2020-10-30

## 2020-10-30 PROBLEM — N81.6 RECTOCELE: Status: ACTIVE | Noted: 2020-10-30

## 2020-10-30 PROCEDURE — NC001 PR NO CHARGE: Performed by: STUDENT IN AN ORGANIZED HEALTH CARE EDUCATION/TRAINING PROGRAM

## 2020-10-30 RX ORDER — DOCUSATE SODIUM 100 MG/1
100 CAPSULE, LIQUID FILLED ORAL 2 TIMES DAILY
Qty: 10 CAPSULE | Refills: 0
Start: 2020-10-30

## 2020-10-30 RX ORDER — OXYCODONE HYDROCHLORIDE 5 MG/1
5 TABLET ORAL EVERY 4 HOURS PRN
Qty: 12 TABLET | Refills: 0 | Status: SHIPPED | OUTPATIENT
Start: 2020-10-30 | End: 2020-11-09

## 2020-10-30 RX ORDER — CYCLOBENZAPRINE HCL 5 MG
5 TABLET ORAL
Qty: 12 TABLET | Refills: 0 | Status: SHIPPED | OUTPATIENT
Start: 2020-10-30

## 2020-10-30 RX ADMIN — OXYCODONE HYDROCHLORIDE 5 MG: 5 TABLET ORAL at 15:28

## 2020-10-30 RX ADMIN — ACETAMINOPHEN 650 MG: 325 TABLET ORAL at 05:32

## 2020-10-30 RX ADMIN — OXYCODONE HYDROCHLORIDE 5 MG: 5 TABLET ORAL at 07:21

## 2020-10-30 RX ADMIN — IBUPROFEN 600 MG: 600 TABLET, FILM COATED ORAL at 04:12

## 2020-10-30 RX ADMIN — ACETAMINOPHEN 650 MG: 325 TABLET ORAL at 12:01

## 2020-10-30 RX ADMIN — IBUPROFEN 600 MG: 600 TABLET, FILM COATED ORAL at 09:20

## 2022-02-07 ENCOUNTER — OFFICE VISIT (OUTPATIENT)
Dept: URGENT CARE | Facility: MEDICAL CENTER | Age: 62
End: 2022-02-07
Payer: COMMERCIAL

## 2022-02-07 VITALS
OXYGEN SATURATION: 98 % | HEART RATE: 90 BPM | RESPIRATION RATE: 20 BRPM | SYSTOLIC BLOOD PRESSURE: 170 MMHG | TEMPERATURE: 97.9 F | DIASTOLIC BLOOD PRESSURE: 80 MMHG

## 2022-02-07 DIAGNOSIS — N76.0 ACUTE VAGINITIS: ICD-10-CM

## 2022-02-07 DIAGNOSIS — R30.0 DYSURIA: Primary | ICD-10-CM

## 2022-02-07 LAB
SL AMB  POCT GLUCOSE, UA: ABNORMAL
SL AMB LEUKOCYTE ESTERASE,UA: ABNORMAL
SL AMB POCT BILIRUBIN,UA: ABNORMAL
SL AMB POCT BLOOD,UA: ABNORMAL
SL AMB POCT CLARITY,UA: ABNORMAL
SL AMB POCT COLOR,UA: YELLOW
SL AMB POCT KETONES,UA: ABNORMAL
SL AMB POCT NITRITE,UA: ABNORMAL
SL AMB POCT PH,UA: 6
SL AMB POCT SPECIFIC GRAVITY,UA: 1.01
SL AMB POCT URINE PROTEIN: ABNORMAL
SL AMB POCT UROBILINOGEN: ABNORMAL

## 2022-02-07 PROCEDURE — 81002 URINALYSIS NONAUTO W/O SCOPE: CPT

## 2022-02-07 PROCEDURE — G0382 LEV 3 HOSP TYPE B ED VISIT: HCPCS | Performed by: PHYSICIAN ASSISTANT

## 2022-02-07 PROCEDURE — 87077 CULTURE AEROBIC IDENTIFY: CPT | Performed by: PHYSICIAN ASSISTANT

## 2022-02-07 PROCEDURE — 87086 URINE CULTURE/COLONY COUNT: CPT | Performed by: PHYSICIAN ASSISTANT

## 2022-02-07 PROCEDURE — 87186 SC STD MICRODIL/AGAR DIL: CPT | Performed by: PHYSICIAN ASSISTANT

## 2022-02-07 RX ORDER — NITROFURANTOIN 25; 75 MG/1; MG/1
100 CAPSULE ORAL 2 TIMES DAILY
Qty: 14 CAPSULE | Refills: 0 | Status: SHIPPED | OUTPATIENT
Start: 2022-02-07 | End: 2022-02-14

## 2022-02-07 RX ORDER — FLUCONAZOLE 150 MG/1
150 TABLET ORAL ONCE
Qty: 1 TABLET | Refills: 0 | Status: SHIPPED | OUTPATIENT
Start: 2022-02-07 | End: 2022-02-07

## 2022-02-07 NOTE — PATIENT INSTRUCTIONS
Dysuria  macrobid twice daily  Follow up with PCP in 3-5 days  Proceed to  ER if symptoms worsen  Dysuria   WHAT YOU NEED TO KNOW:   Dysuria is difficulty urinating, or pain, burning, or discomfort with urination  Dysuria is usually a symptom of another problem  DISCHARGE INSTRUCTIONS:   Return to the emergency department if:   · You have severe back, side, or abdominal pain  · You have fever and shaking chills  · You vomit several times in a row  Contact your healthcare provider if:   · Your symptoms do not go away, even after treatment  · You have questions or concerns about your condition or care  Medicines:   · Medicines  may be given to help treat a bacterial infection or help decrease bladder spasms  · Take your medicine as directed  Contact your healthcare provider if you think your medicine is not helping or if you have side effects  Tell him of her if you are allergic to any medicine  Keep a list of the medicines, vitamins, and herbs you take  Include the amounts, and when and why you take them  Bring the list or the pill bottles to follow-up visits  Carry your medicine list with you in case of an emergency  Follow up with your healthcare provider as directed: Your healthcare provider may also refer you to a urologist or nephrologist to have additional testing  Write down your questions so you remember to ask them during your visits  Manage your dysuria:   · Drink more liquids  Liquids help flush out bacteria that may be causing an infection  Ask your healthcare provider how much liquid to drink each day and which liquids are best for you  · Take sitz baths as directed  Fill a bathtub with 4 to 6 inches of warm water  You may also use a sitz bath pan that fits over a toilet  Sit in the sitz bath for 20 minutes  Do this 2 to 3 times a day, or as directed  The warm water can help decrease pain and swelling       © Copyright [employer] 2021 Information is for End User's use only and may not be sold, redistributed or otherwise used for commercial purposes  All illustrations and images included in CareNotes® are the copyrighted property of [employer]  or Debi [employer]  The above information is an  only  It is not intended as medical advice for individual conditions or treatments  Talk to your doctor, nurse or pharmacist before following any medical regimen to see if it is safe and effective for you

## 2022-02-07 NOTE — PROGRESS NOTES
[facility]        NAME: Rudy Kent is a 59 y o  female  : 1960    MRN: [id number]  DATE: 2022  TIME: 12:15 PM    Assessment and Plan   Dysuria [R30 0]  1  Dysuria  POCT urine dip    Urine culture         Patient Instructions     Dysuria  macrobid twice daily  Follow up with PCP in 3-5 days  Proceed to  ER if symptoms worsen  Chief Complaint     Chief Complaint   Patient presents with    Possible UTI     started one week ago, hx of UTI, symptoms include burning, pressure, freq  History of Present Illness       58-year-old female with past medical history of pelvic surgery presents complaining of frequency, urgency, dysuria  Patient states the symptoms started 2 days ago and denies fevers, chills, abdominal pain, vaginal discharge  Review of Systems   Review of Systems   Constitutional: Negative  HENT: Negative  Eyes: Negative  Respiratory: Negative  Negative for cough, chest tightness, shortness of breath, wheezing and stridor  Cardiovascular: Negative  Negative for chest pain, palpitations and leg swelling  Gastrointestinal: Negative  Genitourinary: Positive for dysuria, frequency and urgency  Negative for decreased urine volume, difficulty urinating, dyspareunia, enuresis, flank pain, genital sores, hematuria, menstrual problem, pelvic pain, vaginal bleeding, vaginal discharge and vaginal pain           Current Medications       Current Outpatient Medications:     acetaminophen (TYLENOL) 500 mg tablet, Take 500-1,000 mg by mouth every 6 (six) hours as needed , Disp: , Rfl:     ascorbic acid (VITAMIN C) 500 mg tablet, Take 500 mg by mouth daily, Disp: , Rfl:     b complex vitamins tablet, Take 1 tablet by mouth daily, Disp: , Rfl:     Cholecalciferol (Vitamin D3) LIQD, Take 9,000 Units by mouth daily Takes as drops, Disp: , Rfl:     cyclobenzaprine (FLEXERIL) 5 mg tablet, Take 1 tablet (5 mg total) by mouth daily at bedtime as needed for muscle spasms, Disp: 12 tablet, Rfl: 0    diphenhydrAMINE (BENADRYL) 25 mg capsule, Take 25 mg by mouth every 6 (six) hours as needed for itching, Disp: , Rfl:     docusate sodium (COLACE) 100 mg capsule, Take 1 capsule (100 mg total) by mouth 2 (two) times a day, Disp: 10 capsule, Rfl: 0    loratadine (CLARITIN) 10 mg tablet, Take 10 mg by mouth daily as needed , Disp: , Rfl:     phenylephrine (SUDAFED PE) 10 MG TABS, Take 10 mg by mouth every 4 (four) hours as needed for congestion, Disp: , Rfl:     Sennosides 17 2 MG TABS, Take 17 2 mg by mouth daily before dinner , Disp: , Rfl:     Current Allergies     Allergies as of 02/07/2022 - Reviewed 02/07/2022   Allergen Reaction Noted    Sulfa antibiotics Rash 10/26/2015            The following portions of the patient's history were reviewed and updated as appropriate: allergies, current medications, past family history, past medical history, past social history, past surgical history and problem list      Past Medical History:   Diagnosis Date    Anesthesia complication     after 1 THR had low BP and Heart rate    Collagen disorder ([address] )     Suspected but not yet diagnosed   Waiting for genetic testing    GERD (gastroesophageal reflux disease)     Osteoarthritis     hips, knees    Pneumonia     Tarlov cysts     TMJ arthritis     more severe on the right    Urinary tract infection 9/6/19    treated with Macrobid 100 mg    Varicella 1965    Wears hearing aid in both ears        Past Surgical History:   Procedure Laterality Date    COLPOPEXY VAGINAL EXTRAPERITONEAL (VEC) N/A 10/29/2020    Procedure: Dominique Bosch;  Surgeon: Kadi Friedman MD;  [facility];  Service: UroGynecology           COLPORRHAPHY N/A 10/29/2020    Procedure: A&P COLPORRHAPHY;  Surgeon: Kadi Friedman MD;  [facility];  Service: UroGynecology          [address] N/A 10/29/2020    Procedure: Marce Pyle;  Surgeon: Kadi Friedman MD;  [facility]; Service: UroGynecology           JOINT REPLACEMENT Bilateral     hip    LAMINECTOMY  08/2019    Surgery from about L5-S4 for Tarlov cysts    PUBOVAGINAL SLING N/A 10/29/2020    Procedure: PV SLING;  Surgeon: Margaret Mcadams MD;  Location[facility];  Service: UroGynecology           TOTAL HIP ARTHROPLASTY Left 02/15/2018    TOTAL HIP ARTHROPLASTY Right 01/15/2019    URETHRAL DILATION  1983       Family History   Problem Relation Age of Onset    Hypertension Mother     Diabetes Mother     Cancer Mother         breast    Coronary artery disease Mother     Arrhythmia Mother     Clotting disorder Mother         clot in leg    Breast cancer Mother 72    Heart failure Mother         Stage 3 CHF    Hyperlipidemia Mother     Cancer Father         lung    Osteoarthritis Father     Hyperlipidemia Sister     Diabetes Paternal Grandmother     Diabetes Paternal Grandfather     Crohn's disease Sister     Migraines Son     No Known Problems Daughter     No Known Problems Sister     No Known Problems Daughter     No Known Problems Maternal Aunt     No Known Problems Maternal Aunt     No Known Problems Maternal Aunt     No Known Problems Maternal Aunt     Stroke Neg Hx     Anuerysm Neg Hx          Medications have been verified  Objective   /80   Pulse 90   Temp 97 9 °F (36 6 °C)   Resp 20   SpO2 98%        Physical Exam     Physical Exam  Constitutional:       Appearance: She is well-developed  HENT:      Head: Normocephalic and atraumatic  Right Ear: External ear normal       Left Ear: External ear normal       Nose: Nose normal       Mouth/Throat:      Pharynx: No oropharyngeal exudate  Cardiovascular:      Rate and Rhythm: Normal rate and regular rhythm  Heart sounds: Normal heart sounds  Pulmonary:      Effort: Pulmonary effort is normal  No respiratory distress  Breath sounds: Normal breath sounds  No wheezing or rales  Chest:      Chest wall: No tenderness  Abdominal:      General: Bowel sounds are normal  There is no distension  Palpations: Abdomen is soft  There is no mass  Tenderness: There is no abdominal tenderness  There is no right CVA tenderness, left CVA tenderness, guarding or rebound  Musculoskeletal:      Cervical back: Normal range of motion and neck supple  Lymphadenopathy:      Cervical: No cervical adenopathy

## 2022-02-09 LAB — BACTERIA UR CULT: ABNORMAL

## 2022-07-23 ENCOUNTER — HOSPITAL ENCOUNTER (OUTPATIENT)
Dept: CT IMAGING | Facility: HOSPITAL | Age: 62
Discharge: HOME | End: 2022-07-23
Payer: COMMERCIAL

## 2022-07-23 DIAGNOSIS — R07.89 OTHER CHEST PAIN: ICD-10-CM

## 2022-07-23 PROCEDURE — G1004 CDSM NDSC: HCPCS

## 2022-07-23 PROCEDURE — 75571 CT HRT W/O DYE W/CA TEST: CPT

## 2022-09-29 ENCOUNTER — OFFICE VISIT (OUTPATIENT)
Dept: DERMATOLOGY | Facility: CLINIC | Age: 62
End: 2022-09-29

## 2022-09-29 VITALS — BODY MASS INDEX: 28.25 KG/M2 | HEIGHT: 68 IN | TEMPERATURE: 96.2 F | WEIGHT: 186.4 LBS

## 2022-09-29 DIAGNOSIS — L57.0 KERATOSIS, ACTINIC: ICD-10-CM

## 2022-09-29 DIAGNOSIS — L82.1 SEBORRHEIC KERATOSIS: ICD-10-CM

## 2022-09-29 DIAGNOSIS — L85.3 XEROSIS OF SKIN: ICD-10-CM

## 2022-09-29 DIAGNOSIS — D22.9 MULTIPLE MELANOCYTIC NEVI: Primary | ICD-10-CM

## 2022-09-29 DIAGNOSIS — D18.01 CHERRY ANGIOMA: ICD-10-CM

## 2022-09-29 DIAGNOSIS — L81.4 LENTIGO: ICD-10-CM

## 2022-09-29 DIAGNOSIS — D48.5 NEOPLASM OF UNCERTAIN BEHAVIOR OF SKIN: ICD-10-CM

## 2022-09-29 PROCEDURE — 88305 TISSUE EXAM BY PATHOLOGIST: CPT | Performed by: STUDENT IN AN ORGANIZED HEALTH CARE EDUCATION/TRAINING PROGRAM

## 2022-09-29 PROCEDURE — 88342 IMHCHEM/IMCYTCHM 1ST ANTB: CPT | Performed by: STUDENT IN AN ORGANIZED HEALTH CARE EDUCATION/TRAINING PROGRAM

## 2022-09-29 PROCEDURE — 11102 TANGNTL BX SKIN SINGLE LES: CPT | Performed by: DERMATOLOGY

## 2022-09-29 PROCEDURE — 11103 TANGNTL BX SKIN EA SEP/ADDL: CPT | Performed by: DERMATOLOGY

## 2022-09-29 PROCEDURE — 99204 OFFICE O/P NEW MOD 45 MIN: CPT | Performed by: DERMATOLOGY

## 2022-09-29 PROCEDURE — 17000 DESTRUCT PREMALG LESION: CPT | Performed by: DERMATOLOGY

## 2022-09-29 PROCEDURE — 17003 DESTRUCT PREMALG LES 2-14: CPT | Performed by: DERMATOLOGY

## 2022-09-29 PROCEDURE — 88341 IMHCHEM/IMCYTCHM EA ADD ANTB: CPT | Performed by: STUDENT IN AN ORGANIZED HEALTH CARE EDUCATION/TRAINING PROGRAM

## 2022-09-29 RX ORDER — NALTREXONE HYDROCHLORIDE 50 MG/1
4.5 TABLET, FILM COATED ORAL DAILY
COMMUNITY

## 2022-09-29 NOTE — PROGRESS NOTES
[facility] Note     Patient Name: Richard Hodgson  Encounter Date: 9/29/2022    • Have you been cared for by a [facility] in the last 3 years and, if so, which one? No    · Have you traveled outside of the [address] [place] [address] in the past 3 months or outside of the [facility] area in the last 2 weeks? No    • May we call your Preferred Phone number to discuss your specific medical information? Yes    • May we leave a detailed message that includes your specific medical information? Yes      Today's Chief Concerns:  • Concern #1:  Skin exam  • Concern #2:      Past Medical History:  Have you personally ever had or currently have any of the following? · Skin cancer (such as Melanoma, Basal Cell Carcinoma, Squamous Cell Carcinoma? (If Yes, please provide more detail)- No  · Eczema: YES  · Psoriasis: No  · HIV/AIDS: No  · Hepatitis B or C: No  · Tuberculosis: No  · Systemic Immunosuppression such as Diabetes, Biologic or Immunotherapy, Chemotherapy, Organ Transplantation, Bone Marrow Transplantation (If YES, please provide more detail): No  · Radiation Treatment (If YES, please provide more detail): No  · Any other major medical conditions/concerns? (If Yes, which types)- YES, Actinic keratosis    Social History:    • What is/was your primary occupation?     • What are your hobbies/past-times? Family History:  Have any of your "first degree relatives" (parent, brother, sister, or child) had any of the following       · Skin cancer such as Melanoma or Merkel Cell Carcinoma or Pancreatic Cancer? YES, Brother: Melanoma  · Eczema, Asthma, Hay Fever or Seasonal Allergies: YES, Eczema: Mother  · Psoriasis or Psoriatic Arthritis: No  · Do any other medical conditions seem to run in your family? If Yes, what condition and which relatives?   YES, Diabetes, Cancer, arthritis, autoimmune diseases    Current Medications:   (please update all dermatological medications before printing patient's AVS!)      Current Outpatient Medications:   •  acetaminophen (TYLENOL) 500 mg tablet, Take 500-1,000 mg by mouth every 6 (six) hours as needed , Disp: , Rfl:   •  ascorbic acid (VITAMIN C) 500 mg tablet, Take 500 mg by mouth daily, Disp: , Rfl:   •  Cholecalciferol (Vitamin D3) LIQD, Take 9,000 Units by mouth daily Takes as drops, Disp: , Rfl:   •  loratadine (CLARITIN) 10 mg tablet, Take 10 mg by mouth daily as needed , Disp: , Rfl:   •  naltrexone (REVIA) 50 mg tablet, Take 50 mg by mouth daily, Disp: , Rfl:   •  phenylephrine (SUDAFED PE) 10 MG TABS, Take 10 mg by mouth every 4 (four) hours as needed for congestion, Disp: , Rfl:   •  Sennosides 17 2 MG TABS, Take 17 2 mg by mouth daily before dinner , Disp: , Rfl:   •  b complex vitamins tablet, Take 1 tablet by mouth daily, Disp: , Rfl:   •  cyclobenzaprine (FLEXERIL) 5 mg tablet, Take 1 tablet (5 mg total) by mouth daily at bedtime as needed for muscle spasms, Disp: 12 tablet, Rfl: 0  •  diphenhydrAMINE (BENADRYL) 25 mg capsule, Take 25 mg by mouth every 6 (six) hours as needed for itching, Disp: , Rfl:   •  docusate sodium (COLACE) 100 mg capsule, Take 1 capsule (100 mg total) by mouth 2 (two) times a day, Disp: 10 capsule, Rfl: 0      Review of Systems:  Have you recently had or currently have any of the following? If YES, what are you doing for the problem? · Fever, chills or unintended weight loss: No  · Sudden loss or change in your vision: No  · Nausea, vomiting or blood in your stool: No  · Painful or swollen joints: YES, arthritis  · Wheezing or cough: YES, hay feer  · Changing mole or non-healing wound: YES, here for skin exam  · Nosebleeds: No  · Excessive sweating: No  · Easy or prolonged bleeding? No  · Over the last 2 weeks, how often have you been bothered by the following problems?   · Taking little interest or pleasure in doing things: 1 - Not at All  · Feeling down, depressed, or hopeless: 1 - Not at All  · Rapid heartbeat with epinephrine:  No    · FEMALES ONLY:    · Are you pregnant or planning to become pregnant? No  · Are you currently or planning to be nursing or breast feeding? No    · Any known allergies? Allergies   Allergen Reactions   • Sulfa Antibiotics Rash         Physical Exam:    • Was a chaperone (Derm Clinical Assistant) present throughout the entire Physical Exam? Yes    • Did the Dermatology Team specifically  the patient on the importance of a Full Skin Exam to be sure that nothing is missed clinically?  Yes}  o Did the patient ultimately request or accept a Full Skin Exam?  Yes  o Did the patient specifically refuse to have the areas "under-the-bra" examined by the Dermatologist? No  o Did the patient specifically refuse to have the areas "under-the-underwear" examined by the Dermatologist? No    CONSTITUTIONAL:   Vitals:    09/29/22 1453   Temp: (!) 96 2 °F (35 7 °C)   Weight: 84 6 kg (186 lb 6 4 oz)   Height: 5' 8" (1 727 m)       PSYCH: Normal mood and affect  EYES: Normal conjunctiva  ENT: Normal lips and oral mucosa  CARDIOVASCULAR: No edema  RESPIRATORY: Normal respirations  HEME/LYMPH/IMMUNO:  No regional lymphadenopathy except as noted below in "ASSESSMENT AND PLAN BY DIAGNOSIS"    SKIN:  FULL ORGAN SYSTEM EXAM   Hair, Scalp, Ears, Face Normal except as noted below in Assessment   Neck, Cervical Chain Nodes Normal except as noted below in Assessment   Right Arm/Hand/Fingers Normal except as noted below in Assessment   Left Arm/Hand/Fingers Normal except as noted below in Assessment   Chest/Breasts/Axillae Viewed areas Normal except as noted below in Assessment   Abdomen, Umbilicus Normal except as noted below in Assessment   Back/Spine Normal except as noted below in Assessment   Groin/Genitalia/Buttocks Normal except as noted below in Assessment   Right Leg, Foot, Toes Normal except as noted below in Assessment   Left Leg, Foot, Toes Normal except as noted below in Assessment        Assessment and Plan by Diagnosis:    MELANOCYTIC NEVI ("Moles")    Physical Exam:  • Anatomic Location Affected:   Mostly on sun-exposed areas of the trunk and extremities  • Morphological Description:  Scattered, 1-4mm round to ovoid, symmetrical-appearing, even bordered, skin colored to dark brown macules/papules, mostly in sun-exposed areas  • Pertinent Positives:  • Pertinent Negatives: Additional History of Present Condition:      Assessment and Plan:  Based on a thorough discussion of this condition and the management approach to it (including a comprehensive discussion of the known risks, side effects and potential benefits of treatment), the patient (family) agrees to implement the following specific plan:  • When outside we recommend using a wide brim hat, sunglasses, long sleeve and pants, sunscreen with SPF 06+ with reapplication every 2 hours, or SPF specific clothing   • Benign, reassured  • Annual skin check     Melanocytic Nevi  Melanocytic nevi ("moles") are tan or brown, raised or flat areas of the skin which have an increased number of melanocytes  Melanocytes are the cells in our body which make pigment and account for skin color  Some moles are present at birth (I e , "congenital nevi"), while others come up later in life (i e , "acquired nevi")  The sun can stimulate the body to make more moles  Sunburns are not the only thing that triggers more moles  Chronic sun exposure can do it too  Clinically distinguishing a healthy mole from melanoma may be difficult, even for experienced dermatologists  The "ABCDE's" of moles have been suggested as a means of helping to alert a person to a suspicious mole and the possible increased risk of melanoma  The suggestions for raising alert are as follows:    Asymmetry: Healthy moles tend to be symmetric, while melanomas are often asymmetric    Asymmetry means if you draw a line through the mole, the two halves do not match in color, size, shape, or surface texture  Asymmetry can be a result of rapid enlargement of a mole, the development of a raised area on a previously flat lesion, scaling, ulceration, bleeding or scabbing within the mole  Any mole that starts to demonstrate "asymmetry" should be examined promptly by a board certified dermatologist      Border: Healthy moles tend to have discrete, even borders  The border of a melanoma often blends into the normal skin and does not sharply delineate the mole from normal skin  Any mole that starts to demonstrate "uneven borders" should be examined promptly by a board certified dermatologist      Color: Healthy moles tend to be one color throughout  Melanomas tend to be made up of different colors ranging from dark black, blue, white, or red  Any mole that demonstrates a color change should be examined promptly by a board certified dermatologist      Diameter: Healthy moles tend to be smaller than 0 6 cm in size; an exception are "congenital nevi" that can be larger  Melanomas tend to grow and can often be greater than 0 6 cm (1/4 of an inch, or the size of a pencil eraser)  This is only a guideline, and many normal moles may be larger than 0 6 cm without being unhealthy  Any mole that starts to change in size (small to bigger or bigger to smaller) should be examined promptly by a board certified dermatologist      Evolving: Healthy moles tend to "stay the same "  Melanomas may often show signs of change or evolution such as a change in size, shape, color, or elevation  Any mole that starts to itch, bleed, crust, burn, hurt, or ulcerate or demonstrate a change or evolution should be examined promptly by a board certified dermatologist         LENTIGO    Physical Exam:  • Anatomic Location Affected:  Trunk and bilateral upper extremities   • Morphological Description:  Light brown macules  • Pertinent Positives:  • Pertinent Negatives:     Additional History of Present Condition: Assessment and Plan:  Based on a thorough discussion of this condition and the management approach to it (including a comprehensive discussion of the known risks, side effects and potential benefits of treatment), the patient (family) agrees to implement the following specific plan:  • When outside we recommend using a wide brim hat, sunglasses, long sleeve and pants, sunscreen with SPF 11+ with reapplication every 2 hours, or SPF specific clothing       What is a lentigo? A lentigo is a pigmented flat or slightly raised lesion with a clearly defined edge  Unlike an ephelis (freckle), it does not fade in the winter months  There are several kinds of lentigo  The name lentigo originally referred to its appearance resembling a small lentil  The plural of lentigo is lentigines, although “lentigos” is also in common use  Who gets lentigines? Lentigines can affect males and females of all ages and races  Solar lentigines are especially prevalent in fair skinned adults  Lentigines associated with syndromes are present at birth or arise during childhood  What causes lentigines? Common forms of lentigo are due to exposure to ultraviolet radiation:  • Sun damage including sunburn   • Indoor tanning   • Phototherapy, especially photochemotherapy (PUVA)    Ionizing radiation, eg radiation therapy, can also cause lentigines  Several familial syndromes associated with widespread lentigines originate from mutations in Tadeo-MAP kinase, mTOR signaling and PTEN pathways  What is the treatment for lentigines? Most lentigines are left alone  Attempts to lighten them may not be successful   The following approaches are used:  • SPF 50+ broad-spectrum sunscreen   • Hydroquinone bleaching cream   • Alpha hydroxy acids   • Vitamin C   • Retinoids   • Azelaic acid   • Chemical peels  Individual lesions can be permanently removed using:  • Cryotherapy   • Intense pulsed light   • Pigment lasers    How can lentigines be prevented? Lentigines associated with exposure ultraviolet radiation can be prevented by very careful sun protection  Clothing is more successful at preventing new lentigines than are sunscreens  What is the outlook for lentigines? Lentigines usually persist  They may increase in number with age and sun exposure  Some in sun-protected sites may fade and disappear  MARRUFO ANGIOMAS    Physical Exam:  • Anatomic Location Affected:  trunk  • Morphological Description:  Scattered cherry red, 1-4 mm papules  • Pertinent Positives:  • Pertinent Negatives: Additional History of Present Condition:      Assessment and Plan:  Based on a thorough discussion of this condition and the management approach to it (including a comprehensive discussion of the known risks, side effects and potential benefits of treatment), the patient (family) agrees to implement the following specific plan:  • Monitor for changes  • Benign, reassured  •     Assessment and Plan:    Cherry angioma, also known as Wanda Muzzy spots, are benign vascular skin lesions  A "cherry angioma" is a firm red, blue or purple papule, 0 1-1 cm in diameter  When thrombosed, they can appear black in colour until evaluated with a dermatoscope when the red or purple colour is more easily seen  Cherry angioma may develop on any part of the body but most often appear on the scalp, face, lips and trunk  An angioma is due to proliferating endothelial cells; these are the cells that line the inside of a blood vessel  Angiomas can arise in early life or later in life; the most common type of angioma is a cherry angioma  Cherry angiomas are very common in males and females of any age or race  They are more noticeable in white skin than in skin of colour  They markedly increase in number from about the age of 36  There may be a family history of similar lesions  Eruptive cherry angiomas have been rarely reported to be associated with internal malignancy  The cause of angiomas is unknown  Genetic analysis of cherry angiomas has shown that they frequently carry specific somatic missense mutations in the GNAQ and GNA11 (Q209H) genes, which are involved in other vascular and melanocytic proliferations  SEBORRHEIC KERATOSIS; NON-INFLAMED    Physical Exam:  • Anatomic Location Affected:  trunk  • Morphological Description:  Flat and raised, waxy, smooth to warty textured, yellow to brownish-grey to dark brown to blackish, discrete, "stuck-on" appearing papules  • Pertinent Positives:  • Pertinent Negatives: Additional History of Present Condition:      Assessment and Plan:  Based on a thorough discussion of this condition and the management approach to it (including a comprehensive discussion of the known risks, side effects and potential benefits of treatment), the patient (family) agrees to implement the following specific plan:  • Monitor for changes  • Benign, reassured  •     Seborrheic Keratosis  A seborrheic keratosis is a harmless warty spot that appears during adult life as a common sign of skin aging  Seborrheic keratoses can arise on any area of skin, covered or uncovered, with the usual exception of the palms and soles  They do not arise from mucous membranes  Seborrheic keratoses can have highly variable appearance  Seborrheic keratoses are extremely common  It has been estimated that over 90% of adults over the age of 61 years have one or more of them  They occur in males and females of all races, typically beginning to erupt in the 35s or 45s  They are uncommon under the age of 21 years  The precise cause of seborrhoeic keratoses is not known  Seborrhoeic keratoses are considered degenerative in nature  As time goes by, seborrheic keratoses tend to become more numerous  Some people inherit a tendency to develop a very large number of them; some people may have hundreds of them        There is no easy way to remove multiple lesions on a single occasion  Unless a specific lesion is "inflamed" and is causing pain or stinging/burning or is bleeding, most insurance companies do not authorize treatment  XEROSIS ("DRY SKIN")    Physical Exam:  • Anatomic Location Affected:  diffuse  • Morphological Description:  xerosis  • Pertinent Positives:  • Pertinent Negatives: Additional History of Present Condition:      Assessment and Plan:  Based on a thorough discussion of this condition and the management approach to it (including a comprehensive discussion of the known risks, side effects and potential benefits of treatment), the patient (family) agrees to implement the following specific plan:  • Use moisturizer like Eucerin,Cerave or Aveeno Cream 3 times a day for the dry skin   •   •    •     Dry skin refers to skin that feels dry to touch  Dry skin has a dull surface with a rough, scaly quality  The skin is less pliable and cracked  When dryness is severe, the skin may become inflamed and fissured  Although any body site can be dry, dry skin tends to affect the shins more than any other site  Dry skin is lacking moisture in the outer horny cell layer (stratum corneum) and this results in cracks in the skin surface  Dry skin is also called xerosis, xeroderma or asteatosis (lack of fat)  It can affect males and females of all ages  There is some racial variability in water and lipid content of the skin  • Dry skin that starts in early childhood may be one of about 20 types of ichthyosis (fish-scale skin)  There is often a family history of dry skin  • Dry skin is commonly seen in people with atopic dermatitis  • Nearly everyone > 60 years has dry skin  Dry skin that begins later may be seen in people with certain diseases and conditions    • Postmenopausal women  • Hypothyroidism  • Chronic renal disease   • Malnutrition and weight loss   • Subclinical dermatitis   • Treatment with certain drugs such as oral retinoids, diuretics and epidermal growth factor receptor inhibitors      What is the treatment for dry skin? The mainstay of treatment of dry skin and ichthyosis is moisturisers/emollients  They should be applied liberally and often enough to:  • Reduce itch   • Improve the barrier function   • Prevent entry of irritants, bacteria   • Reduce transepidermal water loss  How can dry skin be prevented? Eliminate aggravating factors:  • Reduce the frequency of bathing  • A humidifier in winter and air conditioner in summer   • Compare having a short shower with a prolonged soak in a bath  • Use lukewarm, not hot, water  • Replace standard soap with a substitute such as a synthetic detergent cleanser, water-miscible emollient, bath oil, anti-pruritic tar oil, colloidal oatmeal etc    • Apply an emollient liberally and often, particularly shortly after bathing, and when itchy  The drier the skin, the thicker this should be, especially on the hands  What is the outlook for dry skin? A tendency to dry skin may persist life-long, or it may improve once contributing factors are controlled  ACTINIC KERATOSIS    Physical Exam:  • Anatomic Location Affected:  Nose and cheeks  • Morphological Description:  Scaly pink papules    Additional History of Present Condition:  Found on exam today    Assessment and Plan:  Based on a thorough discussion of this condition and the management approach to it (including a comprehensive discussion of the known risks, side effects and potential benefits of treatment), the patient (family) agrees to implement the following specific plan:    • Treated with Cryotherapy today, patient signed consent form  Actinic keratoses are very common on sites repeatedly exposed to the sun, especially the backs of the hands and the face, most often affecting the ears, nose, cheeks, upper lip, vermilion of the lower lip, temples, forehead and balding scalp   In severely chronically sun-damaged individuals, they may also be found on the upper trunk, upper and lower limbs, and dorsum of feet  We discussed the theoretical premalignant (“pre-cancerous”) nature and etiology of these growths  We discussed the prevailing notion that actinic keratoses are a reflection of abnormal skin cell development due to DNA damage by short wavelength UVB  They are more likely to appear if the immune function is poor, due to aging, recent sun exposure, predisposing disease or certain drugs  We discussed that the main concern is that actinic keratoses may predispose to squamous cell carcinoma  It is rare for a solitary actinic keratosis to evolve to squamous cell carcinoma (SCC), but the risk of SCC occurring at some stage in a patient with more than 10 actinic keratoses is thought to be about 10 to 15%  A tender, thickened, ulcerated or enlarging actinic keratosis is suspicious of SCC  Actinic keratoses may be prevented by strict sun protection  If already present, keratoses may improve with a very high sun protection factor (50+) broad-spectrum sunscreen applied at least daily to affected areas, year-round  We recommend that UPF-rated clothing and hats and sunglasses be worn whenever possible and that a sunscreen-moisturizer combination product such as Neutrogena Daily Defense be applied at least three times a day  We performed a thorough discussion of treatment options and specific risk/benefits/alternatives including but not limited to medical “field” treatment with medications such as the following:    • Topical “field area” medications such as 5-fluorouracil or Aldara (specifically, the trouble with long-term compliance, blistering and local skin reaction versus the convenience of at-home therapy and that field therapy “gets what is not yet seen”)      • Cryotherapy (specifically, local pain, scarring, dyspigmentation, blistering, possible superinfection, and treats “only what we see” versus directed treatment today)  • Photodynamic therapy (specifically, local pain, scarring, dyspigmentation, blistering, possible superinfection, need to schedule for a later date, and time spent in the office versus field therapy that “gets what is not yet seen”)  PROCEDURE:  DESTRUCTION OF PRE-MALIGNANT LESIONS  After a thorough discussion of treatment options and risk/benefits/alternatives (including but not limited to local pain, scarring, dyspigmentation, blistering, and possible superinfection), verbal and written consent were obtained and the aforementioned lesions were treated on with cryotherapy using liquid nitrogen x 1 cycle for 5-10 seconds  • TOTAL NUMBER of 4 pre-malignant lesions were treated today on the ANATOMIC LOCATION: nose and cheeks  The patient tolerated the procedure well, and after-care instructions were provided  NEOPLASM OF UNCERTAIN BEHAVIOR OF SKIN    Physical Exam:  • (Anatomic Location); (Size and Morphological Description); (Differential Diagnosis):  o A; Right cheek; superior; 1 X 0 7 cm scaly pink papule; Diff Dx: SCC  o B; Right cheek; inferior; 0 8 X 0 5 cm pink papule; Diff Dx: SCC  o Left neck with 0 7 cm pink papule, only present for a couple weeks-- monitor, if doesn't clear up, will biopsy  • Pertinent Positives:  • Pertinent Negatives: Additional History of Present Condition:  Found on exam    Assessment and Plan:  • I have discussed with the patient that a sample of skin via a "skin biopsy” would be potentially helpful to further make a specific diagnosis under the microscope  • Based on a thorough discussion of this condition and the management approach to it (including a comprehensive discussion of the known risks, side effects and potential benefits of treatment), the patient (family) agrees to implement the following specific plan:    o Procedure:  Skin Biopsy    After a thorough discussion of treatment options and risk/benefits/alternatives (including but not limited to local pain, scarring, dyspigmentation, blistering, possible superinfection, and inability to confirm a diagnosis via histopathology), verbal and written consent were obtained and portion of the rash was biopsied for tissue sample  See below for consent that was obtained from patient and subsequent Procedure Note  PROCEDURE TANGENTIAL (SHAVE) BIOPSY NOTE:    • Performing Physician: Sea Payan   • Anatomic Location; Clinical Description with size (cm); Pre-Op Diagnosis:   o A; Right cheek; superior; 1 X 0 7 cm scaly pink papule; Diff Dx: SCC  o B; Right cheek; inferior; 0 8 X 0 5 cm pink papule; Diff Dx: SCC  • Post-op diagnosis: Same     • Local anesthesia: 1% Lidocaine HCL     • Topical anesthesia: None    • Hemostasis: Aluminum chloride       After obtaining informed consent  at which time there was a discussion about the purpose of biopsy  and low risks of infection and bleeding  The area was prepped and draped in the usual fashion  Anesthesia was obtained with 1% lidocaine with epinephrine  A shave biopsy to an appropriate sampling depth was obtained by Shave (Dermablade or 15 blade) The resulting wound was covered with surgical ointment and bandaged appropriately  The patient tolerated the procedure well without complications and was without signs of functional compromise  Specimen has been sent for review by Dermatopathology  Standard post-procedure care has been explained and has been included in written form within the patient's copy of Informed Consent  INFORMED CONSENT DISCUSSION AND POST-OPERATIVE INSTRUCTIONS FOR PATIENT    I   RATIONALE FOR PROCEDURE  I understand that a skin biopsy allows the Dermatologist to test a lesion or rash under the microscope to obtain a diagnosis  It usually involves numbing the area with numbing medication and removing a small piece of skin; sometimes the area will be closed with sutures  In this specific procedure, sutures are not usually needed    If any sutures are placed, then they are usually need to be removed in 2 weeks or less  I understand that my Dermatologist recommends that a skin "shave" biopsy be performed today  A local anesthetic, similar to the kind that a dentist uses when filling a cavity, will be injected with a very small needle into the skin area to be sampled  The injected skin and tissue underneath "will go to sleep” and become numb so no pain should be felt afterwards  An instrument shaped like a tiny "razor blade" (shave biopsy instrument) will be used to cut a small piece of tissue and skin from the area so that a sample of tissue can be taken and examined more closely under the microscope  A slight amount of bleeding will occur, but it will be stopped with direct pressure and a pressure bandage and any other appropriate methods  I understands that a scar will form where the wound was created  Surgical ointment will be applied to help protect the wound  Sutures are not usually needed  II   RISKS AND POTENTIAL COMPLICATIONS   I understand the risks and potential complications of a skin biopsy include but are not limited to the following:  • Bleeding  • Infection  • Pain  • Scar/keloid  • Skin discoloration  • Incomplete Removal  • Recurrence  • Nerve Damage/Numbness/Loss of Function  • Allergic Reaction to Anesthesia  • Biopsies are diagnostic procedures and based on findings additional treatment or evaluation may be required  • Loss or destruction of specimen resulting in no additional findings    My Dermatologist has explained to me the nature of the condition, the nature of the procedure, and the benefits to be reasonably expected compared with alternative approaches  My Dermatologist has discussed the likelihood of major risks or complications of this procedure including the specific risks listed above, such as bleeding, infection, and scarring/keloid  I understand that a scar is expected after this procedure    I understand that my physician cannot predict if the scar will form a "keloid," which extends beyond the borders of the wound that is created  A keloid is a thick, painful, and bumpy scar  A keloid can be difficult to treat, as it does not always respond well to therapy, which includes injecting cortisone directly into the keloid every few weeks  While this usually reduces the pain and size of the scar, it does not eliminate it  I understand that photographs may be taken before and after the procedure  These will be maintained as part of the medical providers confidential records and may not be made available to me  I further authorize the medical provider to use the photographs for teaching purposes or to illustrate scientific papers, books, or lectures if in his/her judgment, medical research, education, or science may benefit from its use  I have had an opportunity to fully inquire about the risks and benefits of this procedure and its alternatives  I have been given ample time and opportunity to ask questions and to seek a second opinion if I wished to do so  I acknowledge that there have specifically been no guarantees as to the cosmetic results from the procedure  I am aware that with any procedure there is always the possibility of an unexpected complication  III  POST-PROCEDURAL CARE (WHAT YOU WILL NEED TO DO "AFTER THE BIOPSY" TO OPTIMIZE HEALING)    • Keep the area clean and dry  Try NOT to remove the bandage or get it wet for the first 24 hours  • Gently clean the area and apply surgical ointment (such as Vaseline petrolatum ointment, which is available "over the counter" and not a prescription) to the biopsy site for up to 2 weeks straight  This acts to protect the wound from the outside world  • Sutures are not usually placed in this procedure  If any sutures were placed, return for suture removal as instructed (generally 1 week for the face, 2 weeks for the body)        • Take Acetaminophen (Tylenol) for discomfort, if no contraindications  Ibuprofen or aspirin could make bleeding worse  • Call our office immediately for signs of infection: fever, chills, increased redness, warmth, tenderness, discomfort/pain, or pus or foul smell coming from the wound  WHAT TO DO IF THERE IS ANY BLEEDING? If a small amount of bleeding is noticed, place a clean cloth over the area and apply firm pressure for ten minutes  Check the wound after 10 minutes of direct pressure  If bleeding persists, try one more time for an additional 10 minutes of direct pressure on the area  If the bleeding becomes heavier or does not stop after the second attempt, or if you have any other questions about this procedure, then please call your [facility] Dermatologist by calling [phone] (SKIN)  I hereby acknowledge that I have reviewed and verified the site with my Dermatologist and have requested and authorized my Dermatologist to proceed with the procedure            Scribe Attestation    I,:  Donnell Ledesma am acting as a scribe while in the presence of the attending physician :       I,:  Luz Maria Jauregui MD personally performed the services described in this documentation    as scribed in my presence :

## 2022-09-29 NOTE — PATIENT INSTRUCTIONS
MELANOCYTIC NEVI ("Moles")  Assessment and Plan:  Based on a thorough discussion of this condition and the management approach to it (including a comprehensive discussion of the known risks, side effects and potential benefits of treatment), the patient (family) agrees to implement the following specific plan:  When outside we recommend using a wide brim hat, sunglasses, long sleeve and pants, sunscreen with SPF 56+ with reapplication every 2 hours, or SPF specific clothing   Benign, reassured  Annual skin check     Melanocytic Nevi  Melanocytic nevi ("moles") are tan or brown, raised or flat areas of the skin which have an increased number of melanocytes  Melanocytes are the cells in our body which make pigment and account for skin color  Some moles are present at birth (I e , "congenital nevi"), while others come up later in life (i e , "acquired nevi")  The sun can stimulate the body to make more moles  Sunburns are not the only thing that triggers more moles  Chronic sun exposure can do it too  Clinically distinguishing a healthy mole from melanoma may be difficult, even for experienced dermatologists  The "ABCDE's" of moles have been suggested as a means of helping to alert a person to a suspicious mole and the possible increased risk of melanoma  The suggestions for raising alert are as follows:    Asymmetry: Healthy moles tend to be symmetric, while melanomas are often asymmetric  Asymmetry means if you draw a line through the mole, the two halves do not match in color, size, shape, or surface texture  Asymmetry can be a result of rapid enlargement of a mole, the development of a raised area on a previously flat lesion, scaling, ulceration, bleeding or scabbing within the mole  Any mole that starts to demonstrate "asymmetry" should be examined promptly by a board certified dermatologist      Border: Healthy moles tend to have discrete, even borders    The border of a melanoma often blends into the normal skin and does not sharply delineate the mole from normal skin  Any mole that starts to demonstrate "uneven borders" should be examined promptly by a board certified dermatologist      Color: Healthy moles tend to be one color throughout  Melanomas tend to be made up of different colors ranging from dark black, blue, white, or red  Any mole that demonstrates a color change should be examined promptly by a board certified dermatologist      Diameter: Healthy moles tend to be smaller than 0 6 cm in size; an exception are "congenital nevi" that can be larger  Melanomas tend to grow and can often be greater than 0 6 cm (1/4 of an inch, or the size of a pencil eraser)  This is only a guideline, and many normal moles may be larger than 0 6 cm without being unhealthy  Any mole that starts to change in size (small to bigger or bigger to smaller) should be examined promptly by a board certified dermatologist      Evolving: Healthy moles tend to "stay the same "  Melanomas may often show signs of change or evolution such as a change in size, shape, color, or elevation  Any mole that starts to itch, bleed, crust, burn, hurt, or ulcerate or demonstrate a change or evolution should be examined promptly by a board certified dermatologist         Margaret Soto and Plan:  Based on a thorough discussion of this condition and the management approach to it (including a comprehensive discussion of the known risks, side effects and potential benefits of treatment), the patient (family) agrees to implement the following specific plan:  When outside we recommend using a wide brim hat, sunglasses, long sleeve and pants, sunscreen with SPF 67+ with reapplication every 2 hours, or SPF specific clothing       What is a lentigo? A lentigo is a pigmented flat or slightly raised lesion with a clearly defined edge  Unlike an ephelis (freckle), it does not fade in the winter months  There are several kinds of lentigo    The name lentigo originally referred to its appearance resembling a small lentil  The plural of lentigo is lentigines, although “lentigos” is also in common use  Who gets lentigines? Lentigines can affect males and females of all ages and races  Solar lentigines are especially prevalent in fair skinned adults  Lentigines associated with syndromes are present at birth or arise during childhood  What causes lentigines? Common forms of lentigo are due to exposure to ultraviolet radiation:  Sun damage including sunburn   Indoor tanning   Phototherapy, especially photochemotherapy (PUVA)    Ionizing radiation, eg radiation therapy, can also cause lentigines  Several familial syndromes associated with widespread lentigines originate from mutations in Tadeo-MAP kinase, mTOR signaling and PTEN pathways  What is the treatment for lentigines? Most lentigines are left alone  Attempts to lighten them may not be successful  The following approaches are used:  SPF 50+ broad-spectrum sunscreen   Hydroquinone bleaching cream   Alpha hydroxy acids   Vitamin C   Retinoids   Azelaic acid   Chemical peels  Individual lesions can be permanently removed using:  Cryotherapy   Intense pulsed light   Pigment lasers    How can lentigines be prevented? Lentigines associated with exposure ultraviolet radiation can be prevented by very careful sun protection  Clothing is more successful at preventing new lentigines than are sunscreens  What is the outlook for lentigines? Lentigines usually persist  They may increase in number with age and sun exposure  Some in sun-protected sites may fade and disappear      MARRUFO ANGIOMAS  Assessment and Plan:  Based on a thorough discussion of this condition and the management approach to it (including a comprehensive discussion of the known risks, side effects and potential benefits of treatment), the patient (family) agrees to implement the following specific plan:  Monitor for changes  Benign, reassured      Assessment and Plan:    Cherry angioma, also known as Christain Wylie spots, are benign vascular skin lesions  A "cherry angioma" is a firm red, blue or purple papule, 0 1-1 cm in diameter  When thrombosed, they can appear black in colour until evaluated with a dermatoscope when the red or purple colour is more easily seen  Cherry angioma may develop on any part of the body but most often appear on the scalp, face, lips and trunk  An angioma is due to proliferating endothelial cells; these are the cells that line the inside of a blood vessel  Angiomas can arise in early life or later in life; the most common type of angioma is a cherry angioma  Cherry angiomas are very common in males and females of any age or race  They are more noticeable in white skin than in skin of colour  They markedly increase in number from about the age of 36  There may be a family history of similar lesions  Eruptive cherry angiomas have been rarely reported to be associated with internal malignancy  The cause of angiomas is unknown  Genetic analysis of cherry angiomas has shown that they frequently carry specific somatic missense mutations in the GNAQ and GNA11 (Q209H) genes, which are involved in other vascular and melanocytic proliferations  SEBORRHEIC KERATOSIS; NON-INFLAMED  Assessment and Plan:  Based on a thorough discussion of this condition and the management approach to it (including a comprehensive discussion of the known risks, side effects and potential benefits of treatment), the patient (family) agrees to implement the following specific plan:  Monitor for changes  Benign, reassured      Seborrheic Keratosis  A seborrheic keratosis is a harmless warty spot that appears during adult life as a common sign of skin aging  Seborrheic keratoses can arise on any area of skin, covered or uncovered, with the usual exception of the palms and soles  They do not arise from mucous membranes   Seborrheic keratoses can have highly variable appearance  Seborrheic keratoses are extremely common  It has been estimated that over 90% of adults over the age of 61 years have one or more of them  They occur in males and females of all races, typically beginning to erupt in the 35s or 45s  They are uncommon under the age of 21 years  The precise cause of seborrhoeic keratoses is not known  Seborrhoeic keratoses are considered degenerative in nature  As time goes by, seborrheic keratoses tend to become more numerous  Some people inherit a tendency to develop a very large number of them; some people may have hundreds of them  There is no easy way to remove multiple lesions on a single occasion  Unless a specific lesion is "inflamed" and is causing pain or stinging/burning or is bleeding, most insurance companies do not authorize treatment  XEROSIS ("DRY SKIN")    Assessment and Plan:  Based on a thorough discussion of this condition and the management approach to it (including a comprehensive discussion of the known risks, side effects and potential benefits of treatment), the patient (family) agrees to implement the following specific plan:  Use moisturizer like Eucerin,Cerave or Aveeno Cream 3 times a day for the dry skin            Dry skin refers to skin that feels dry to touch  Dry skin has a dull surface with a rough, scaly quality  The skin is less pliable and cracked  When dryness is severe, the skin may become inflamed and fissured  Although any body site can be dry, dry skin tends to affect the shins more than any other site  Dry skin is lacking moisture in the outer horny cell layer (stratum corneum) and this results in cracks in the skin surface  Dry skin is also called xerosis, xeroderma or asteatosis (lack of fat)  It can affect males and females of all ages  There is some racial variability in water and lipid content of the skin    Dry skin that starts in early childhood may be one of about 20 types of ichthyosis (fish-scale skin)  There is often a family history of dry skin  Dry skin is commonly seen in people with atopic dermatitis  Nearly everyone > 60 years has dry skin  Dry skin that begins later may be seen in people with certain diseases and conditions  Postmenopausal women  Hypothyroidism  Chronic renal disease   Malnutrition and weight loss   Subclinical dermatitis   Treatment with certain drugs such as oral retinoids, diuretics and epidermal growth factor receptor inhibitors      What is the treatment for dry skin? The mainstay of treatment of dry skin and ichthyosis is moisturisers/emollients  They should be applied liberally and often enough to:  Reduce itch   Improve the barrier function   Prevent entry of irritants, bacteria   Reduce transepidermal water loss  How can dry skin be prevented? Eliminate aggravating factors:  Reduce the frequency of bathing  A humidifier in winter and air conditioner in summer   Compare having a short shower with a prolonged soak in a bath  Use lukewarm, not hot, water  Replace standard soap with a substitute such as a synthetic detergent cleanser, water-miscible emollient, bath oil, anti-pruritic tar oil, colloidal oatmeal etc    Apply an emollient liberally and often, particularly shortly after bathing, and when itchy  The drier the skin, the thicker this should be, especially on the hands  What is the outlook for dry skin? A tendency to dry skin may persist life-long, or it may improve once contributing factors are controlled  ACTINIC KERATOSIS  Assessment and Plan:  Based on a thorough discussion of this condition and the management approach to it (including a comprehensive discussion of the known risks, side effects and potential benefits of treatment), the patient (family) agrees to implement the following specific plan:    Treated with Cryotherapy today, patient signed consent form      Actinic keratoses are very common on sites repeatedly exposed to the sun, especially the backs of the hands and the face, most often affecting the ears, nose, cheeks, upper lip, vermilion of the lower lip, temples, forehead and balding scalp  In severely chronically sun-damaged individuals, they may also be found on the upper trunk, upper and lower limbs, and dorsum of feet  We discussed the theoretical premalignant (“pre-cancerous”) nature and etiology of these growths  We discussed the prevailing notion that actinic keratoses are a reflection of abnormal skin cell development due to DNA damage by short wavelength UVB  They are more likely to appear if the immune function is poor, due to aging, recent sun exposure, predisposing disease or certain drugs  We discussed that the main concern is that actinic keratoses may predispose to squamous cell carcinoma  It is rare for a solitary actinic keratosis to evolve to squamous cell carcinoma (SCC), but the risk of SCC occurring at some stage in a patient with more than 10 actinic keratoses is thought to be about 10 to 15%  A tender, thickened, ulcerated or enlarging actinic keratosis is suspicious of SCC  Actinic keratoses may be prevented by strict sun protection  If already present, keratoses may improve with a very high sun protection factor (50+) broad-spectrum sunscreen applied at least daily to affected areas, year-round  We recommend that UPF-rated clothing and hats and sunglasses be worn whenever possible and that a sunscreen-moisturizer combination product such as Neutrogena Daily Defense be applied at least three times a day      We performed a thorough discussion of treatment options and specific risk/benefits/alternatives including but not limited to medical “field” treatment with medications such as the following:    Topical “field area” medications such as 5-fluorouracil or Aldara (specifically, the trouble with long-term compliance, blistering and local skin reaction versus the convenience of at-home therapy and that field therapy “gets what is not yet seen”)  Cryotherapy (specifically, local pain, scarring, dyspigmentation, blistering, possible superinfection, and treats “only what we see” versus directed treatment today)  Photodynamic therapy (specifically, local pain, scarring, dyspigmentation, blistering, possible superinfection, need to schedule for a later date, and time spent in the office versus field therapy that “gets what is not yet seen”)  PROCEDURE:  DESTRUCTION OF PRE-MALIGNANT LESIONS  After a thorough discussion of treatment options and risk/benefits/alternatives (including but not limited to local pain, scarring, dyspigmentation, blistering, and possible superinfection), verbal and written consent were obtained and the aforementioned lesions were treated on with cryotherapy using liquid nitrogen x 1 cycle for 5-10 seconds  TOTAL NUMBER of 4 pre-malignant lesions were treated today on the ANATOMIC LOCATION: nose and cheeks  The patient tolerated the procedure well, and after-care instructions were provided  NEOPLASM OF UNCERTAIN BEHAVIOR OF SKIN    Assessment and Plan:  I have discussed with the patient that a sample of skin via a "skin biopsy” would be potentially helpful to further make a specific diagnosis under the microscope  Based on a thorough discussion of this condition and the management approach to it (including a comprehensive discussion of the known risks, side effects and potential benefits of treatment), the patient (family) agrees to implement the following specific plan:    Procedure:  Skin Biopsy  After a thorough discussion of treatment options and risk/benefits/alternatives (including but not limited to local pain, scarring, dyspigmentation, blistering, possible superinfection, and inability to confirm a diagnosis via histopathology), verbal and written consent were obtained and portion of the rash was biopsied for tissue sample  See below for consent that was obtained from patient and subsequent Procedure Note  After obtaining informed consent  at which time there was a discussion about the purpose of biopsy  and low risks of infection and bleeding  The area was prepped and draped in the usual fashion  Anesthesia was obtained with 1% lidocaine with epinephrine  A shave biopsy to an appropriate sampling depth was obtained by Shave (Dermablade or 15 blade) The resulting wound was covered with surgical ointment and bandaged appropriately  The patient tolerated the procedure well without complications and was without signs of functional compromise  Specimen has been sent for review by Dermatopathology  Standard post-procedure care has been explained and has been included in written form within the patient's copy of Informed Consent  INFORMED CONSENT DISCUSSION AND POST-OPERATIVE INSTRUCTIONS FOR PATIENT    I   RATIONALE FOR PROCEDURE  I understand that a skin biopsy allows the Dermatologist to test a lesion or rash under the microscope to obtain a diagnosis  It usually involves numbing the area with numbing medication and removing a small piece of skin; sometimes the area will be closed with sutures  In this specific procedure, sutures are not usually needed  If any sutures are placed, then they are usually need to be removed in 2 weeks or less  I understand that my Dermatologist recommends that a skin "shave" biopsy be performed today  A local anesthetic, similar to the kind that a dentist uses when filling a cavity, will be injected with a very small needle into the skin area to be sampled  The injected skin and tissue underneath "will go to sleep” and become numb so no pain should be felt afterwards    An instrument shaped like a tiny "razor blade" (shave biopsy instrument) will be used to cut a small piece of tissue and skin from the area so that a sample of tissue can be taken and examined more closely under the microscope  A slight amount of bleeding will occur, but it will be stopped with direct pressure and a pressure bandage and any other appropriate methods  I understands that a scar will form where the wound was created  Surgical ointment will be applied to help protect the wound  Sutures are not usually needed  II   RISKS AND POTENTIAL COMPLICATIONS   I understand the risks and potential complications of a skin biopsy include but are not limited to the following:  Bleeding  Infection  Pain  Scar/keloid  Skin discoloration  Incomplete Removal  Recurrence  Nerve Damage/Numbness/Loss of Function  Allergic Reaction to Anesthesia  Biopsies are diagnostic procedures and based on findings additional treatment or evaluation may be required  Loss or destruction of specimen resulting in no additional findings    My Dermatologist has explained to me the nature of the condition, the nature of the procedure, and the benefits to be reasonably expected compared with alternative approaches  My Dermatologist has discussed the likelihood of major risks or complications of this procedure including the specific risks listed above, such as bleeding, infection, and scarring/keloid  I understand that a scar is expected after this procedure  I understand that my physician cannot predict if the scar will form a "keloid," which extends beyond the borders of the wound that is created  A keloid is a thick, painful, and bumpy scar  A keloid can be difficult to treat, as it does not always respond well to therapy, which includes injecting cortisone directly into the keloid every few weeks  While this usually reduces the pain and size of the scar, it does not eliminate it  I understand that photographs may be taken before and after the procedure  These will be maintained as part of the medical providers confidential records and may not be made available to me    I further authorize the medical provider to use the photographs for teaching purposes or to illustrate scientific papers, books, or lectures if in his/her judgment, medical research, education, or science may benefit from its use  I have had an opportunity to fully inquire about the risks and benefits of this procedure and its alternatives  I have been given ample time and opportunity to ask questions and to seek a second opinion if I wished to do so  I acknowledge that there have specifically been no guarantees as to the cosmetic results from the procedure  I am aware that with any procedure there is always the possibility of an unexpected complication  III  POST-PROCEDURAL CARE (WHAT YOU WILL NEED TO DO "AFTER THE BIOPSY" TO OPTIMIZE HEALING)    Keep the area clean and dry  Try NOT to remove the bandage or get it wet for the first 24 hours  Gently clean the area and apply surgical ointment (such as Vaseline petrolatum ointment, which is available "over the counter" and not a prescription) to the biopsy site for up to 2 weeks straight  This acts to protect the wound from the outside world  Sutures are not usually placed in this procedure  If any sutures were placed, return for suture removal as instructed (generally 1 week for the face, 2 weeks for the body)  Take Acetaminophen (Tylenol) for discomfort, if no contraindications  Ibuprofen or aspirin could make bleeding worse  Call our office immediately for signs of infection: fever, chills, increased redness, warmth, tenderness, discomfort/pain, or pus or foul smell coming from the wound  WHAT TO DO IF THERE IS ANY BLEEDING? If a small amount of bleeding is noticed, place a clean cloth over the area and apply firm pressure for ten minutes  Check the wound after 10 minutes of direct pressure  If bleeding persists, try one more time for an additional 10 minutes of direct pressure on the area    If the bleeding becomes heavier or does not stop after the second attempt, or if you have any other questions about this procedure, then please call your [facility] by calling [phone] (SKIN)  I hereby acknowledge that I have reviewed and verified the site with my Dermatologist and have requested and authorized my Dermatologist to proceed with the procedure

## 2022-10-12 DIAGNOSIS — D04.39 SQUAMOUS CELL CARCINOMA IN SITU (SCCIS) OF SKIN OF RIGHT CHEEK: Primary | ICD-10-CM

## 2022-10-12 NOTE — RESULT ENCOUNTER NOTE
DERMATOPATHOLOGY RESULT NOTE    Results reviewed by ordering physician  RESIDENTS: Please call patient and review results   Offer A) liquid nitrogen vs efudex 2x/day x 2 weeks, B) mohs vs efudex 2x/day x 6 weeks      Result & Plan by Specimen:    Specimen A: benign  Plan: tbd      Specimen B: malignant  Plan: tbd

## 2022-10-13 ENCOUNTER — OFFICE VISIT (OUTPATIENT)
Dept: DERMATOLOGY | Facility: CLINIC | Age: 62
End: 2022-10-13

## 2022-10-13 VITALS — HEIGHT: 68 IN | WEIGHT: 186 LBS | TEMPERATURE: 96.5 F | BODY MASS INDEX: 28.19 KG/M2

## 2022-10-13 DIAGNOSIS — L57.0 KERATOSIS, ACTINIC: Primary | ICD-10-CM

## 2022-10-13 DIAGNOSIS — D48.5 NEOPLASM OF UNCERTAIN BEHAVIOR OF SKIN: ICD-10-CM

## 2022-10-13 PROCEDURE — 17003 DESTRUCT PREMALG LES 2-14: CPT | Performed by: DERMATOLOGY

## 2022-10-13 PROCEDURE — 88305 TISSUE EXAM BY PATHOLOGIST: CPT | Performed by: STUDENT IN AN ORGANIZED HEALTH CARE EDUCATION/TRAINING PROGRAM

## 2022-10-13 PROCEDURE — 17000 DESTRUCT PREMALG LESION: CPT | Performed by: DERMATOLOGY

## 2022-10-13 PROCEDURE — 11102 TANGNTL BX SKIN SINGLE LES: CPT | Performed by: DERMATOLOGY

## 2022-10-13 NOTE — PROGRESS NOTES
PROCEDURE:  DESTRUCTION OF PRE-MALIGNANT LESIONS  After a thorough discussion of treatment options and risk/benefits/alternatives (including but not limited to local pain, scarring, dyspigmentation, blistering, and possible superinfection), verbal and written consent were obtained and the aforementioned lesions were treated on with cryotherapy using liquid nitrogen x 1 cycle for 5-10 seconds  • TOTAL NUMBER of 2 pre-malignant lesions were treated today on the ANATOMIC LOCATION: right cheek, nose  The patient tolerated the procedure well, and after-care instructions were provided  NEOPLASM OF UNCERTAIN BEHAVIOR OF SKIN    Physical Exam:  • (Anatomic Location); (Size and Morphological Description); (Differential Diagnosis):  o Left neck, 1 cm pink papule, differential BCC  • Pertinent Positives:  • Pertinent Negatives: Additional History of Present Condition:      Assessment and Plan:  • I have discussed with the patient that a sample of skin via a "skin biopsy” would be potentially helpful to further make a specific diagnosis under the microscope  • Based on a thorough discussion of this condition and the management approach to it (including a comprehensive discussion of the known risks, side effects and potential benefits of treatment), the patient (family) agrees to implement the following specific plan:    o Procedure:  Skin Biopsy  After a thorough discussion of treatment options and risk/benefits/alternatives (including but not limited to local pain, scarring, dyspigmentation, blistering, possible superinfection, and inability to confirm a diagnosis via histopathology), verbal and written consent were obtained and portion of the rash was biopsied for tissue sample  See below for consent that was obtained from patient and subsequent Procedure Note    PROCEDURE TANGENTIAL (SHAVE) BIOPSY NOTE:    • Performing Physician: Morteza Ho   • Anatomic Location; Clinical Description with size (cm); Pre-Op Diagnosis:   Left neck, 1 cm pink papule, differential BCC  • Post-op diagnosis: Same     • Local anesthesia: 1% Lidocaine HCL     • Topical anesthesia: None    • Hemostasis: Aluminum chloride       After obtaining informed consent  at which time there was a discussion about the purpose of biopsy  and low risks of infection and bleeding  The area was prepped and draped in the usual fashion  Anesthesia was obtained with 1% lidocaine with epinephrine  A shave biopsy to an appropriate sampling depth was obtained by Shave (Dermablade or 15 blade) The resulting wound was covered with surgical ointment and bandaged appropriately  The patient tolerated the procedure well without complications and was without signs of functional compromise  Specimen has been sent for review by Dermatopathology  Standard post-procedure care has been explained and has been included in written form within the patient's copy of Informed Consent  INFORMED CONSENT DISCUSSION AND POST-OPERATIVE INSTRUCTIONS FOR PATIENT    I   RATIONALE FOR PROCEDURE  I understand that a skin biopsy allows the Dermatologist to test a lesion or rash under the microscope to obtain a diagnosis  It usually involves numbing the area with numbing medication and removing a small piece of skin; sometimes the area will be closed with sutures  In this specific procedure, sutures are not usually needed  If any sutures are placed, then they are usually need to be removed in 2 weeks or less  I understand that my Dermatologist recommends that a skin "shave" biopsy be performed today  A local anesthetic, similar to the kind that a dentist uses when filling a cavity, will be injected with a very small needle into the skin area to be sampled  The injected skin and tissue underneath "will go to sleep” and become numb so no pain should be felt afterwards    An instrument shaped like a tiny "razor blade" (shave biopsy instrument) will be used to cut a small piece of tissue and skin from the area so that a sample of tissue can be taken and examined more closely under the microscope  A slight amount of bleeding will occur, but it will be stopped with direct pressure and a pressure bandage and any other appropriate methods  I understands that a scar will form where the wound was created  Surgical ointment will be applied to help protect the wound  Sutures are not usually needed  II   RISKS AND POTENTIAL COMPLICATIONS   I understand the risks and potential complications of a skin biopsy include but are not limited to the following:  • Bleeding  • Infection  • Pain  • Scar/keloid  • Skin discoloration  • Incomplete Removal  • Recurrence  • Nerve Damage/Numbness/Loss of Function  • Allergic Reaction to Anesthesia  • Biopsies are diagnostic procedures and based on findings additional treatment or evaluation may be required  • Loss or destruction of specimen resulting in no additional findings    My Dermatologist has explained to me the nature of the condition, the nature of the procedure, and the benefits to be reasonably expected compared with alternative approaches  My Dermatologist has discussed the likelihood of major risks or complications of this procedure including the specific risks listed above, such as bleeding, infection, and scarring/keloid  I understand that a scar is expected after this procedure  I understand that my physician cannot predict if the scar will form a "keloid," which extends beyond the borders of the wound that is created  A keloid is a thick, painful, and bumpy scar  A keloid can be difficult to treat, as it does not always respond well to therapy, which includes injecting cortisone directly into the keloid every few weeks  While this usually reduces the pain and size of the scar, it does not eliminate it  I understand that photographs may be taken before and after the procedure    These will be maintained as part of the medical providers confidential records and may not be made available to me  I further authorize the medical provider to use the photographs for teaching purposes or to illustrate scientific papers, books, or lectures if in his/her judgment, medical research, education, or science may benefit from its use  I have had an opportunity to fully inquire about the risks and benefits of this procedure and its alternatives  I have been given ample time and opportunity to ask questions and to seek a second opinion if I wished to do so  I acknowledge that there have specifically been no guarantees as to the cosmetic results from the procedure  I am aware that with any procedure there is always the possibility of an unexpected complication  III  POST-PROCEDURAL CARE (WHAT YOU WILL NEED TO DO "AFTER THE BIOPSY" TO OPTIMIZE HEALING)    • Keep the area clean and dry  Try NOT to remove the bandage or get it wet for the first 24 hours  • Gently clean the area and apply surgical ointment (such as Vaseline petrolatum ointment, which is available "over the counter" and not a prescription) to the biopsy site for up to 2 weeks straight  This acts to protect the wound from the outside world  • Sutures are not usually placed in this procedure  If any sutures were placed, return for suture removal as instructed (generally 1 week for the face, 2 weeks for the body)  • Take Acetaminophen (Tylenol) for discomfort, if no contraindications  Ibuprofen or aspirin could make bleeding worse  • Call our office immediately for signs of infection: fever, chills, increased redness, warmth, tenderness, discomfort/pain, or pus or foul smell coming from the wound  WHAT TO DO IF THERE IS ANY BLEEDING? If a small amount of bleeding is noticed, place a clean cloth over the area and apply firm pressure for ten minutes  Check the wound after 10 minutes of direct pressure    If bleeding persists, try one more time for an additional 10 minutes of direct pressure on the area  If the bleeding becomes heavier or does not stop after the second attempt, or if you have any other questions about this procedure, then please call your [facility] by calling [phone] (SKIN)  I hereby acknowledge that I have reviewed and verified the site with my Dermatologist and have requested and authorized my Dermatologist to proceed with the procedure                Scribe Attestation    I,:  Terrell Roth am acting as a scribe while in the presence of the attending physician :       I,:  Leonid Epps MD personally performed the services described in this documentation    as scribed in my presence :

## 2022-10-13 NOTE — PATIENT INSTRUCTIONS
PROCEDURE:  DESTRUCTION OF PRE-MALIGNANT LESIONS  After a thorough discussion of treatment options and risk/benefits/alternatives (including but not limited to local pain, scarring, dyspigmentation, blistering, and possible superinfection), verbal and written consent were obtained and the aforementioned lesions were treated on with cryotherapy using liquid nitrogen x 1 cycle for 5-10 seconds  TOTAL NUMBER of 2 pre-malignant lesions were treated today on the ANATOMIC LOCATION: right cheek, nose  The patient tolerated the procedure well, and after-care instructions were provided  NEOPLASM OF UNCERTAIN BEHAVIOR OF SKIN    Assessment and Plan:  I have discussed with the patient that a sample of skin via a "skin biopsy” would be potentially helpful to further make a specific diagnosis under the microscope  Based on a thorough discussion of this condition and the management approach to it (including a comprehensive discussion of the known risks, side effects and potential benefits of treatment), the patient (family) agrees to implement the following specific plan:    Procedure:  Skin Biopsy  After a thorough discussion of treatment options and risk/benefits/alternatives (including but not limited to local pain, scarring, dyspigmentation, blistering, possible superinfection, and inability to confirm a diagnosis via histopathology), verbal and written consent were obtained and portion of the rash was biopsied for tissue sample  See below for consent that was obtained from patient and subsequent Procedure Note  PROCEDURE TANGENTIAL (SHAVE) BIOPSY NOTE:    After obtaining informed consent  at which time there was a discussion about the purpose of biopsy  and low risks of infection and bleeding  The area was prepped and draped in the usual fashion  Anesthesia was obtained with 1% lidocaine with epinephrine   A shave biopsy to an appropriate sampling depth was obtained by Shave (Dermablade or 15 blade) The resulting wound was covered with surgical ointment and bandaged appropriately  The patient tolerated the procedure well without complications and was without signs of functional compromise  Specimen has been sent for review by Dermatopathology  Standard post-procedure care has been explained and has been included in written form within the patient's copy of Informed Consent  INFORMED CONSENT DISCUSSION AND POST-OPERATIVE INSTRUCTIONS FOR PATIENT    I   RATIONALE FOR PROCEDURE  I understand that a skin biopsy allows the Dermatologist to test a lesion or rash under the microscope to obtain a diagnosis  It usually involves numbing the area with numbing medication and removing a small piece of skin; sometimes the area will be closed with sutures  In this specific procedure, sutures are not usually needed  If any sutures are placed, then they are usually need to be removed in 2 weeks or less  I understand that my Dermatologist recommends that a skin "shave" biopsy be performed today  A local anesthetic, similar to the kind that a dentist uses when filling a cavity, will be injected with a very small needle into the skin area to be sampled  The injected skin and tissue underneath "will go to sleep” and become numb so no pain should be felt afterwards  An instrument shaped like a tiny "razor blade" (shave biopsy instrument) will be used to cut a small piece of tissue and skin from the area so that a sample of tissue can be taken and examined more closely under the microscope  A slight amount of bleeding will occur, but it will be stopped with direct pressure and a pressure bandage and any other appropriate methods  I understands that a scar will form where the wound was created  Surgical ointment will be applied to help protect the wound  Sutures are not usually needed      II   RISKS AND POTENTIAL COMPLICATIONS   I understand the risks and potential complications of a skin biopsy include but are not limited to the following:  Bleeding  Infection  Pain  Scar/keloid  Skin discoloration  Incomplete Removal  Recurrence  Nerve Damage/Numbness/Loss of Function  Allergic Reaction to Anesthesia  Biopsies are diagnostic procedures and based on findings additional treatment or evaluation may be required  Loss or destruction of specimen resulting in no additional findings    My Dermatologist has explained to me the nature of the condition, the nature of the procedure, and the benefits to be reasonably expected compared with alternative approaches  My Dermatologist has discussed the likelihood of major risks or complications of this procedure including the specific risks listed above, such as bleeding, infection, and scarring/keloid  I understand that a scar is expected after this procedure  I understand that my physician cannot predict if the scar will form a "keloid," which extends beyond the borders of the wound that is created  A keloid is a thick, painful, and bumpy scar  A keloid can be difficult to treat, as it does not always respond well to therapy, which includes injecting cortisone directly into the keloid every few weeks  While this usually reduces the pain and size of the scar, it does not eliminate it  I understand that photographs may be taken before and after the procedure  These will be maintained as part of the medical providers confidential records and may not be made available to me  I further authorize the medical provider to use the photographs for teaching purposes or to illustrate scientific papers, books, or lectures if in his/her judgment, medical research, education, or science may benefit from its use  I have had an opportunity to fully inquire about the risks and benefits of this procedure and its alternatives  I have been given ample time and opportunity to ask questions and to seek a second opinion if I wished to do so    I acknowledge that there have specifically been no guarantees as to the cosmetic results from the procedure  I am aware that with any procedure there is always the possibility of an unexpected complication  III  POST-PROCEDURAL CARE (WHAT YOU WILL NEED TO DO "AFTER THE BIOPSY" TO OPTIMIZE HEALING)    Keep the area clean and dry  Try NOT to remove the bandage or get it wet for the first 24 hours  Gently clean the area and apply surgical ointment (such as Vaseline petrolatum ointment, which is available "over the counter" and not a prescription) to the biopsy site for up to 2 weeks straight  This acts to protect the wound from the outside world  Sutures are not usually placed in this procedure  If any sutures were placed, return for suture removal as instructed (generally 1 week for the face, 2 weeks for the body)  Take Acetaminophen (Tylenol) for discomfort, if no contraindications  Ibuprofen or aspirin could make bleeding worse  Call our office immediately for signs of infection: fever, chills, increased redness, warmth, tenderness, discomfort/pain, or pus or foul smell coming from the wound  WHAT TO DO IF THERE IS ANY BLEEDING? If a small amount of bleeding is noticed, place a clean cloth over the area and apply firm pressure for ten minutes  Check the wound after 10 minutes of direct pressure  If bleeding persists, try one more time for an additional 10 minutes of direct pressure on the area  If the bleeding becomes heavier or does not stop after the second attempt, or if you have any other questions about this procedure, then please call your [address] [place] [address]'s Dermatologist by calling [phone] (SKIN)  I hereby acknowledge that I have reviewed and verified the site with my Dermatologist and have requested and authorized my Dermatologist to proceed with the procedure

## 2022-10-14 ENCOUNTER — TELEPHONE (OUTPATIENT)
Dept: DERMATOLOGY | Facility: CLINIC | Age: 62
End: 2022-10-14

## 2022-10-14 NOTE — TELEPHONE ENCOUNTER
Pre- operative Mohs Telephone Scheduling Note    Do you have a pacemaker or defibrillator? no    Do you take antibiotics before skin or dental procedures? no  If yes, will likely require pre-operative antibiotics  Ask  the patient why they take the antibiotics (usually because of joint replacement)  Do you have a history of a joint replacements within the past 2 years? no (Hips 2019)   If yes, will likely require pre-operative antibiotics  Ask if orthopaedic surgeon has prescribed pre-operative antibiotics to take before procedures/dental work? Do you take any OTC medications that thin your blood (Aspirin, Aleve, Ibuprofen) or supplements that thin your blood (fish oil, garlic, vitamin E, Ginko Biloba)? no    Do you take any prescribed medications that thin your blood (Coumadin, Plavix, Xarelto, Eliquis or another prescribed blood thinner)? no     Do you have an allergy to lidocaine or epinephrine? no    Do you have an allergy to shellfish? no    Do you smoke? no      If yes,  patient to try and stop 2 days before surgery and 7 days after the surgery  Minimizing smoking as much as possible during this time will improve healing and the cosmetic result after surgery  Date scheduled: 10/19/2022 at 10:00 AM with Dr Imelda Owen right cheek (inferior)     Coordination of Care with other provider (Oculoplastics, Plastics, ENT) required? no   IF YES, PLEASE FORWARD TO APPROPRIATE PERSONNEL TO HELP COORDINATE  Are there remaining tumors to be scheduled? no    Was Prior Authorization obtained?  No

## 2022-10-18 ENCOUNTER — TELEPHONE (OUTPATIENT)
Dept: DERMATOLOGY | Facility: CLINIC | Age: 62
End: 2022-10-18

## 2022-10-19 ENCOUNTER — PROCEDURE VISIT (OUTPATIENT)
Dept: DERMATOLOGY | Facility: CLINIC | Age: 62
End: 2022-10-19

## 2022-10-19 VITALS
DIASTOLIC BLOOD PRESSURE: 78 MMHG | SYSTOLIC BLOOD PRESSURE: 138 MMHG | OXYGEN SATURATION: 100 % | WEIGHT: 187 LBS | TEMPERATURE: 98.3 F | HEART RATE: 70 BPM | HEIGHT: 68 IN | BODY MASS INDEX: 28.34 KG/M2

## 2022-10-19 DIAGNOSIS — D04.39 SQUAMOUS CELL CARCINOMA IN SITU (SCCIS) OF SKIN OF RIGHT CHEEK: ICD-10-CM

## 2022-10-19 PROCEDURE — 17311 MOHS 1 STAGE H/N/HF/G: CPT | Performed by: STUDENT IN AN ORGANIZED HEALTH CARE EDUCATION/TRAINING PROGRAM

## 2022-10-19 PROCEDURE — 12052 INTMD RPR FACE/MM 2.6-5.0 CM: CPT | Performed by: STUDENT IN AN ORGANIZED HEALTH CARE EDUCATION/TRAINING PROGRAM

## 2022-10-19 NOTE — PROGRESS NOTES
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  Past Medical History:   Diagnosis Date   • Actinic keratosis    • Allergic    • Anesthesia complication     after 1 THR had low BP and Heart rate   • Arthritis    • Collagen disorder (HCC)     Suspected but not yet diagnosed   Waiting for genetic testing   • Eczema    • GERD (gastroesophageal reflux disease)    • Osteoarthritis     hips, knees   • Pneumonia    • Squamous cell skin cancer 2022    right cheek, inferior   • Tarlov cysts    • TMJ arthritis     more severe on the right   • Urinary tract infection 2019    treated with Macrobid 100 mg   • Varicella 1965   • Wears hearing aid in both ears        Past Surgical History:   Procedure Laterality Date   • COLPOPEXY VAGINAL EXTRAPERITONEAL (VEC) N/A 10/29/2020    Procedure: Norval Melquiades;  Surgeon: Hemal Roberts MD;  [facility];  Service: UroGynecology          • COLPORRHAPHY N/A 10/29/2020    Procedure: A&P COLPORRHAPHY;  Surgeon: Hemal Roberts MD;  [facility];  Service: UroGynecology          • [place] N/A 10/29/2020    Procedure: [place] Last;  Surgeon: Hemal Roberts MD;  [facility];  Service: UroGynecology          • JOINT REPLACEMENT Bilateral     hip   • LAMINECTOMY  2019    Surgery from about L5-S4 for Tarlov cysts   • MOHS SURGERY Right 10/19/2022    SCCIS right cheek, inferior-Dr Romo   • PUBOVAGINAL SLING N/A 10/29/2020    Procedure: PV SLING;  Surgeon: Hemal Roberts MD;  [facility];  Service: UroGynecology          • SKIN BIOPSY     • TOTAL HIP ARTHROPLASTY Left 02/15/2018   • TOTAL HIP ARTHROPLASTY Right 01/15/2019   • URETHRAL DILATION           Current Outpatient Medications:   •  acetaminophen (TYLENOL) 500 mg tablet, Take 500-1,000 mg by mouth every 6 (six) hours as needed , Disp: , Rfl:   •  ascorbic acid (VITAMIN C) 500 mg tablet, Take 500 mg by mouth daily, Disp: , Rfl:   •  Cholecalciferol (Vitamin D3) LIQD, Take 9,000 Units by mouth daily Takes as drops, Disp: , Rfl:   •  loratadine (CLARITIN) 10 mg tablet, Take 10 mg by mouth daily as needed , Disp: , Rfl:   •  naltrexone (REVIA) 50 mg tablet, Take 4 5 mg by mouth daily Taken nightly, Disp: , Rfl:   •  phenylephrine (SUDAFED PE) 10 MG TABS, Take 10 mg by mouth every 4 (four) hours as needed for congestion, Disp: , Rfl:   •  Sennosides 17 2 MG TABS, Take 17 2 mg by mouth daily before dinner , Disp: , Rfl:   •  b complex vitamins tablet, Take 1 tablet by mouth daily, Disp: , Rfl:   •  cyclobenzaprine (FLEXERIL) 5 mg tablet, Take 1 tablet (5 mg total) by mouth daily at bedtime as needed for muscle spasms, Disp: 12 tablet, Rfl: 0  •  diphenhydrAMINE (BENADRYL) 25 mg capsule, Take 25 mg by mouth every 6 (six) hours as needed for itching, Disp: , Rfl:   •  docusate sodium (COLACE) 100 mg capsule, Take 1 capsule (100 mg total) by mouth 2 (two) times a day, Disp: 10 capsule, Rfl: 0    Allergies   Allergen Reactions   • Sulfa Antibiotics Rash       Physical Exam:   Vitals:    10/19/22 1030   BP: 138/78   Pulse: 70   Temp: 98 3 °F (36 8 °C)   SpO2: 100%     General: Awake, Alert, Oriented x 3, Mood and affect appropriate  Respiratory: Respirations even and unlabored  Cardiovascular: Peripheral pulses intact; no edema  Musculoskeletal Exam: N/A  Skin: 0 7 cm x 0 7 cm pink atrophic macule on the right cheek    Assessment: Biopsy confirmed squamous cell carcinoma in situ on the right cheek    Plan:  Mohs    MOHS Procedure Timeout    Flowsheet Row Most Recent Value   Patient Identity Verified: Yes   Correct Site Verified: Yes   Correct Procedure Verified: Yes          MOHS Diagnosis/Indication/Location/ID    Flowsheet Row Most Recent Value   Pathology Type Squamous cell carcinoma   Anatomic Site right cheek or buccal area   Indications for MOHS tumor location   MOHS ID [id number]          MOHS Site/Accession/Pre-Post    Flowsheet Row Most Recent Value   Original Site Identified (as submitted by referring clinician) Photo   Biopsy Accession/Specimen # (as submitted by referring clincian) [id number]   Pre-MOHS Size Length (cm) 0 7   Pre-MOHS Size Width (cm) 0 7   Post-MOHS Size-Length (cm) 1   Post MOHS Size-Width (cm) 1 2   Repair Type Intermediate layered closure   Suture Type Monocryl plus, Prolene   Monocryl Plus Suture Size 5   Prolene Suture Size 6   Final repair length (cm): 3 4   Anesthetic Used 1% Lidocaine with epinephrine          MOHS Tumor Stage 1 Information    Flowsheet Row Most Recent Value   Tissue Sections (blocks) 2   Microscopic Exam Section 1: No tumor identified in section  Microscopic Exam Section 2: No tumor identified in section  Tumor Clear After Stage I? Yes                        Patient identified, procedure verified, site identified and verified  Time out completed  Surgical removal of the lesion discussed with the patient (risks and benefits, including possibility of scarring, infection, recurrence or potential for further treatment)  I have specifically identified the site with the patient  I have discussed the fact that the patient will have a scar after the procedure regardless of granulation or repair with sutures  I have discussed that the repair options can range from granulation in some cases to linear or curvilinear closures to larger flaps or grafts  There are sometimes flaps or grafts used that require multiples stages of surgery and will not be completed today, rather be completed over a series of appointments   I have discussed that occasionally due to location, size or depth of the lesion I may recommend consultation with and transfer of care for further removal or the reconstruction to another provider such as ophthalmology surgery, plastic surgery, ENT surgery, or surgical oncology  There are cases in which other testing such as imaging with MRI or CT scan or testing of lymph nodes is recommended because of the nature/depth/location of tumor seen during the removal  There is a risk of injury to nerves causing temporary or permanent numbness or the inability to move muscles full such as the inability to lift eyebrows  Questions answered and verbal and written consent was obtained  The tumor qualifies for Mohs based on AUC criteria  With the patient in the supine position and under adequate local anesthesia with 1% lidocaine with epinephrine 1:100,000, the defect was scrubbed with Hibiclens  Sterile drapes were placed from the sterile tray  Because of the location of the surgical defect, an intermediate closure was judged to give the best possible cosmetic and functional result  The edges of the defect were carefully debrided removing any dead or coagulated tissue  Hemostasis was obtained by pinpoint electrocoagulation  Careful planning of removal of redundant tissue at either end of the defect was drawn out so that the suture lines would fall in the optimal orientation with regard to the relaxed skin tension lines  These were then removed with a #15 blade scalpel  The wound was then approximated by a deep layer of buried vertical mattress sutures and the cutaneous margins were approximated and closed by superficial sutures as noted above  Estimated blood loss was less than 5 mL  The patient tolerated the procedure well  The wound was dressed with petrolatum, a non-stick pad, and a compression dressing  Ramon Carlson served as the surgeon and pathologist during the procedure  Postoperative care: Wound care discussed at length  I urged the patient to call us if any problems or question should arise       Complications: none  Post-op medications: none  Patient condition after procedure: stable  Discharge plans: Plan for suture removal 7 days, at next scheduled appointment         Scribe Attestation    I,:  Jossue Elias MA am acting as a scribe while in the presence of the attending physician :       I,:  Ramon Carlson MD personally performed the services described in this documentation    as scribed in my presence :

## 2022-10-19 NOTE — PATIENT INSTRUCTIONS
Mohs Microscopic Surgery After Care    WOUND CARE AFTER SURGERY:    Do NOT to remove the pressure bandage for 48 hours  Keep the area clean and dry while this bandage is on  After removing the bandage for the first time, gently clean the area with soap and water  If the bandage is difficult to remove, getting the bandage wet in the shower will sometimes help soften the adhesive and allow it to be removed more easily  You will now need to cleanse this area daily in the shower with gentle soap  There is no need to scrub the area  Apply plain Vaseline ointment (this is over the counter and not a prescription) to the site followed by a clean appropriately sized bandage to area  Non stick dressing and paper tape (or Hypafix) are recommended for sensitive skin but a bandaid is fine if it covers the area well  You will need to continue the above daily wound care until you return for suture removal in 7 days (generally 7 days for the face, 10-14 days off the face)      RESTRICTIONS:     For two DAYS:   - You will need to take it very easy as this time is highest risk for bleeding  Being a "couch potato" during these two days is generally recommended  - For surgeries on the face/neck/scalp: Avoid leaning down to pick things up off the floor as this brings blood up to your head  Instead, squat down to pick things up  For two WEEKS:   - No heavy lifting (anything greater than 10 pounds)   - You can start to do slow, gentle activities such as slow walking but nothing to increase your heart rate and blood pressure too much (such as cardiovascular exercise)  It is important to take it easy as there is still a risk for bleeding and a high risk popping of stitches open during this time  If we did surgery near the eyes (including the nose, forehead, front part of your scalp, cheeks): It is VERY common to get a large amount of swelling around your eyes (puffy eyes)   Although less frequent, this can be enough to swell your eyes shut and can also come along with bruising  This should not hurt and is very expected and normal  It is typically worst at ~ 3 days out from your surgery and dramatically better 1 week post-operatively  If we did surgery around your nose: No blowing your nose as this puts you at higher risk of popping stitches durign this time  Instead dab under your nose with a tissue or use a Q-tip inside your nose  If we did surgery on the skin above or bellow your lip or your lip itself: No sipping from straws as this uses a lot of the muscles around your mouth and increases the risk of popping stitches during this time  MANAGING YOUR PAIN AFTER SURGERY     You can expect to have some pain after surgery  This is normal  The pain is typically worse the first two days after surgery, and quickly begins to get better  The best strategy for controlling your pain after surgery is around the clock pain control  You can take over the counter Acetaminophen (Tylenol) for discomfort, if no contraindications  If you are taking this at the maximum dose, you can alternate this with Motrin (ibuprofen or Advil) as well  Alternating these medications with each other allows you to maximize your pain control  In addition to Tylenol and Motrin, you can use heating pads or ice packs on your incisions to help reduce your pain  How will I alternate your regular strength over-the-counter pain medication? You will take a dose of pain medication every three hours  Start by taking 650 mg of Tylenol (2 pills of 325 mg)   3 hours later take 600 mg of Motrin (3 pills of 200 mg)   3 hours after taking the Motrin take 650 mg of Tylenol   3 hours after that take 600 mg of Motrin      See example - if your first dose of Tylenol is at 12:00 PM     12:00 PM  Tylenol 650 mg (2 pills of 325 mg)    3:00 PM  Motrin 600 mg (3 pills of 200 mg)    6:00 PM  Tylenol 650 mg (2 pills of 325 mg)    9:00 PM  Motrin 600 mg (3 pills of 200 mg)    Continue alternating every 3 hours      Important:   Do not take more than 4000mg of Tylenol or 3200mg of Motrin in a 24-hour period  What if I still have pain? If you have pain that is not controlled with the over-the-counter pain medications (Tylenol and Motrin or Advil), don't hesitate to call our staff using the number provided  We will help make sure you are managing your pain in the best way possible, and if necessary, we can provide a prescription for additional pain medication  CALL OUR OFFICE IMMEDIATELY FOR ANY SIGNS OF INFECTION:    This includes fever, chills, increased redness, warmth, tenderness, severe discomfort/pain, or pus or foul smell coming from the wound  [facility] directly at [phone] (SKIN)    IF BLEEDING IS NOTICED:    Place a clean cloth over the area and apply firm pressure for thirty minutes  Check the wound ONLY after 30 minutes of direct pressure; do not cheat and sneak a peak, as that does not count  If bleeding persists after 30 minutes of legitimate direct pressure, then try one more round of direct pressure to the area  Should the bleeding become heavier or not stop after the second attempt, call [facility] directly at [phone] (SKIN)  Your call will get routed to the dermatology surgeon on call even after hours

## 2022-10-26 ENCOUNTER — OFFICE VISIT (OUTPATIENT)
Dept: DERMATOLOGY | Facility: CLINIC | Age: 62
End: 2022-10-26

## 2022-10-26 DIAGNOSIS — Z48.02 ENCOUNTER FOR REMOVAL OF SUTURES: Primary | ICD-10-CM

## 2022-10-26 NOTE — PROGRESS NOTES
Suture removal    Date/Time: 10/26/2022 2:20 PM  Performed by: Jocelin Duff RN  Authorized by: Zhang Sweeney MD   Universal Protocol:  Consent: Verbal consent obtained  Written consent not obtained  Risks and benefits: risks, benefits and alternatives were discussed  Consent given by: patient  Time out: Immediately prior to procedure a "time out" was called to verify the correct patient, procedure, equipment, support staff and site/side marked as required  Timeout called at: 10/26/2022 2:20 PM   Patient understanding: patient states understanding of the procedure being performed  Patient consent: the patient's understanding of the procedure matches consent given  Procedure consent: procedure consent matches procedure scheduled  Relevant documents: relevant documents present and verified  Test results: test results not available  Site marked: the operative site was not marked  Radiology Images displayed and confirmed  If images not available, report reviewed: imaging studies not available  Patient identity confirmed: verbally with patient        Patient location:  Clinic  Location:     Laterality:  Right    Location:  [address] [facility] location:  [address] [place] [address] location:  R cheek (inferior cheek)  Procedure details: Tools used:  Suture removal kit    Wound appearance:  No sign(s) of infection, good wound healing, clean, moist, nonpurulent, nontender and pink    Number of sutures removed:  13  Post-procedure details:     Post-procedure assessment: vaseline ointment applied  Patient tolerance of procedure: Tolerated well, no immediate complications  Comments:      Patient was encouraged to continue to clean and care for the wound until fully healed  Patient was encouraged to continue to follow up for regular full body skin exams as scheduled  Patient will continue to care for her biopsy site above the mohs site as suggested           Upon arival      Post suture removal    Scribe Attestation    I,: Kinza Iqbal RN am acting as a scribe while in the presence of the attending physician :       I,:  Osito Camargo MD personally performed the services described in this documentation    as scribed in my presence :

## 2022-10-28 ENCOUNTER — PATIENT MESSAGE (OUTPATIENT)
Dept: DERMATOLOGY | Facility: CLINIC | Age: 62
End: 2022-10-28

## 2022-11-01 DIAGNOSIS — C44.41 BASAL CELL CARCINOMA (BCC) OF SKIN OF NECK: Primary | ICD-10-CM

## 2022-11-01 NOTE — PATIENT COMMUNICATION
Can I ask one of you to please call patient to discuss results and treatment options? Patient and barry are able to view results and know they are awaiting a call  I appreciate it  Thank you so much!

## 2022-11-02 ENCOUNTER — TELEPHONE (OUTPATIENT)
Dept: DERMATOLOGY | Facility: CLINIC | Age: 62
End: 2022-11-02

## 2022-11-02 ENCOUNTER — OFFICE VISIT (OUTPATIENT)
Dept: DERMATOLOGY | Facility: CLINIC | Age: 62
End: 2022-11-02

## 2022-11-02 DIAGNOSIS — C44.41 BASAL CELL CARCINOMA (BCC) OF SKIN OF NECK: ICD-10-CM

## 2022-11-02 DIAGNOSIS — Z48.89 ENCOUNTER FOR POST SURGICAL WOUND CHECK: Primary | ICD-10-CM

## 2022-11-02 NOTE — PROGRESS NOTES
WOUND CHECK    Physical Exam:  • Anatomic Location Affected:  Right inferior cheek  • Description of wound: wound well healing with mild bruising present  • Closure Type: intermediate closure    Additional History of Present Condition:  S/p Mohs procedure on 10/19/2022  Patient presents today for bruising and flattening of the suture site in the middle  Also states the occasional deep aching pain felt with initial numbing in the area is still present but has not worsened  No apparent infection symptoms present  Assessment and Plan:  Based on a thorough discussion of this condition and the management approach to it (including a comprehensive discussion of the known risks, side effects and potential benefits of treatment), the patient (family) agrees to implement the following specific plan:  • Patient will continue to monitor the area for new/worsening symptoms  • Patient will give the nerve more time to heal but will call if pain worsens or does not resolve over the next couple of week  • Patient will call with any further questions or concerns         Upon arrival    Sheba Villaseñor    I,:  Aby William RN am acting as a scribe while in the presence of the attending physician :       I,:  Kathleen Napoles MD personally performed the services described in this documentation    as scribed in my presence :

## 2022-11-02 NOTE — TELEPHONE ENCOUNTER
----- Message from Ronel Caruso sent at 11/1/2022  4:48 PM EDT -----  Regarding: FW: Biopsy results consult    ----- Message -----  From: Chano Recinos MD  Sent: 11/1/2022   1:16 PM EDT  To: Angel Amin MD  Subject: FW: Biopsy results consult                       Called patient to discuss results  Patient agreeable to Mohs surgery  Referral placed     ----- Message -----  From: Ronel Caruso  Sent: 11/1/2022  12:43 PM EDT  To: Renetta Lim DO, Valencia Norman MD, #  Subject: FW: Biopsy results consult                       Please see below messages and the message I sent as well  Thank you so much!  ----- Message -----  From: Cathie Benavides  Sent: 11/1/2022  12:08 PM EDT  To: [facility]  Subject: Biopsy results consult                           This message is being sent by Alexa Kerr on behalf of [employer]  It's pretty frustrating  Is there a reason for delaying having someone speak with me about my test results so that I can get the Mohs surgery set up? I realize basal cell carcinoma is not as alarming as the squamous cell carcinoma test results I recently had, but even an automated text or email response saying that someone will be calling me to go over my test results seems the right thing to do  Having a cancer diagnosis, even a non-invasive type like basal cell carcinoma, is not something that sits well easily in one's gut

## 2022-11-03 NOTE — TELEPHONE ENCOUNTER
Pre- operative Mohs Telephone Scheduling Note    Do you have a pacemaker or defibrillator? no    Do you take antibiotics before skin or dental procedures? no  If yes, will likely require pre-operative antibiotics  Ask  the patient why they take the antibiotics (usually because of joint replacement)  Do you have a history of a joint replacements within the past 2 years? no   If yes, will likely require pre-operative antibiotics  Ask if orthopaedic surgeon has prescribed pre-operative antibiotics to take before procedures/dental work? Do you take any OTC medications that thin your blood (Aspirin, Aleve, Ibuprofen) or supplements that thin your blood (fish oil, garlic, vitamin E, Ginko Biloba)? no    Do you take any prescribed medications that thin your blood (Coumadin, Plavix, Xarelto, Eliquis or another prescribed blood thinner)? no    Do you have an allergy to lidocaine or epinephrine? no    Do you have an allergy to shellfish? no    Do you smoke? no      If yes,  patient to try and stop 2 days before surgery and 7 days after the surgery  Minimizing smoking as much as possible during this time will improve healing and the cosmetic result after surgery  Date scheduled: 12/6/2022 @ 830 am with Dr Yasir Guzman of Care with other provider (Zarina Amaya, ENT) required? no   IF YES, PLEASE FORWARD TO APPROPRIATE PERSONNEL TO HELP COORDINATE  Are there remaining tumors to be scheduled? no    Was Prior Authorization obtained?  No (please use  [email or web address] to document prior auth)

## 2022-11-15 ENCOUNTER — TELEPHONE (OUTPATIENT)
Dept: DERMATOLOGY | Age: 62
End: 2022-11-15

## 2022-11-17 ENCOUNTER — OFFICE VISIT (OUTPATIENT)
Dept: DERMATOLOGY | Facility: CLINIC | Age: 62
End: 2022-11-17

## 2022-11-17 DIAGNOSIS — Z51.89 VISIT FOR WOUND CHECK: Primary | ICD-10-CM

## 2022-11-17 NOTE — PROGRESS NOTES
WOUND CHECK    Physical Exam:  • Anatomic Location Affected:  Left neck  • Description of wound: Pink scar  • Closure Type: Second intention s/p shave biopsy on 10/13/2022    Additional History of Present Condition:  S/P shave biopsy on 10/13/2022 on her left neck  The biopsy resulted to be a [address] which she is getting MOHS for on 12/6/2022  Pt called the office stating she doesn't think the area is healing properly  Pt wanted to make sure it wasn't infected before the procedure  Assessment and Plan:  Based on a thorough discussion of this condition and the management approach to it (including a comprehensive discussion of the known risks, side effects and potential benefits of treatment), the patient (family) agrees to implement the following specific plan:  • Upon examination, no signs of infection present  The area was mildy pink  Informed pt the pink color won't go down until after MOHS since the skin cancer is still present at this time  • No further wound care is necessary  • Pt scheduled for MOHS on 12/6/2022 with Dr Rachael Braun             Scribe Attestation    I,:  Gabriel Pittman RN am acting as a scribe while in the presence of the attending physician :       I,:  Snow Khan MD personally performed the services described in this documentation    as scribed in my presence :

## 2022-12-06 ENCOUNTER — PROCEDURE VISIT (OUTPATIENT)
Dept: DERMATOLOGY | Facility: CLINIC | Age: 62
End: 2022-12-06

## 2022-12-06 VITALS
HEIGHT: 68 IN | OXYGEN SATURATION: 100 % | TEMPERATURE: 98.1 F | SYSTOLIC BLOOD PRESSURE: 148 MMHG | WEIGHT: 187.2 LBS | BODY MASS INDEX: 28.37 KG/M2 | HEART RATE: 98 BPM | DIASTOLIC BLOOD PRESSURE: 88 MMHG

## 2022-12-06 DIAGNOSIS — C44.41 BASAL CELL CARCINOMA (BCC) OF SKIN OF NECK: Primary | ICD-10-CM

## 2022-12-06 NOTE — PATIENT INSTRUCTIONS
Mohs Microscopic Surgery After Care    WOUND CARE AFTER SURGERY:    Do NOT to remove the pressure bandage for 48 hours  Keep the area clean and dry while this bandage is on  After removing the bandage for the first time, gently clean the area with soap and water  If the bandage is difficult to remove, getting the bandage wet in the shower will sometimes help soften the adhesive and allow it to be removed more easily  You will now need to cleanse this area daily in the shower with gentle soap  There is no need to scrub the area  You will need to apply plain Vaseline ointment (this is over the counter and not a prescription) to the site for up to 2 weeks followed by a clean appropriately sized bandage to area  Non stick dressing and paper tape (or Hypafix) are recommended for sensitive skin but a bandaid is fine if it covers the area well  All your stitches will dissolve over the next two weeks  You will need to keep these moist with Vaseline and covered with a bandage over the next 2 weeks for them to dissolve appropriately  RESTRICTIONS:     For two DAYS:   - You will need to take it very easy as this time is highest risk for bleeding  Being a "couch potato" during these two days is generally recommended  - For surgeries on the face/neck/scalp: Avoid leaning down to pick things up off the floor as this brings blood up to your head  Instead, squat down to pick things up  For two WEEKS:   - No heavy lifting (anything greater than 10 pounds)   - You can start to do slow, gentle activities such as slow walking but nothing to increase your heart rate and blood pressure too much (such as cardiovascular exercise)  It is important to take it easy as there is still a risk for bleeding and a high risk popping of stitches open during this time  MANAGING YOUR PAIN AFTER SURGERY     You can expect to have some pain after surgery   This is normal  The pain is typically worse the first two days after surgery, and quickly begins to get better  The best strategy for controlling your pain after surgery is around the clock pain control  You can take over the counter Acetaminophen (Tylenol) for discomfort, if no contraindications  If you are taking this at the maximum dose, you can alternate this with Motrin (ibuprofen or Advil) as well  Alternating these medications with each other allows you to maximize your pain control  In addition to Tylenol and Motrin, you can use heating pads or ice packs on your incisions to help reduce your pain  How will I alternate your regular strength over-the-counter pain medication? You will take a dose of pain medication every three hours  Start by taking 650 mg of Tylenol (2 pills of 325 mg)   3 hours later take 600 mg of Motrin (3 pills of 200 mg)   3 hours after taking the Motrin take 650 mg of Tylenol   3 hours after that take 600 mg of Motrin  See example - if your first dose of Tylenol is at 12:00 PM     12:00 PM  Tylenol 650 mg (2 pills of 325 mg)    3:00 PM  Motrin 600 mg (3 pills of 200 mg)    6:00 PM  Tylenol 650 mg (2 pills of 325 mg)    9:00 PM  Motrin 600 mg (3 pills of 200 mg)    Continue alternating every 3 hours      Important:   Do not take more than 4000mg of Tylenol or 3200mg of Motrin in a 24-hour period  What if I still have pain? If you have pain that is not controlled with the over-the-counter pain medications (Tylenol and Motrin or Advil), don't hesitate to call our staff using the number provided  We will help make sure you are managing your pain in the best way possible, and if necessary, we can provide a prescription for additional pain medication  CALL OUR OFFICE IMMEDIATELY FOR ANY SIGNS OF INFECTION:    This includes fever, chills, increased redness, warmth, tenderness, severe discomfort/pain, or pus or foul smell coming from the wound   If you are experiencing any of the above, please call   [facility] directly at [phone] (SKIN)    IF BLEEDING IS NOTICED:    Place a clean cloth over the area and apply firm pressure for thirty minutes  Check the wound ONLY after 30 minutes of direct pressure; do not cheat and sneak a peak, as that does not count  If bleeding persists after 30 minutes of legitimate direct pressure, then try one more round of direct pressure to the area  Should the bleeding become heavier or not stop after the second attempt, call [facility] directly at [phone] (SKIN)  Your call will get routed to the dermatology surgeon on call even after hours

## 2022-12-06 NOTE — PROGRESS NOTES
MOHS Procedure Note    Patient: Ranjan Christiansen  : 1960  MRN: [id number]  Date: 2022    History of Present Illness: The patient is a 57 y o  female who presents with complaints of Basal cell carcinoma superficial type of the left neck  Past Medical History:   Diagnosis Date   • Actinic keratosis    • Allergic    • Anesthesia complication     after 1 THR had low BP and Heart rate   • Arthritis    • Basal cell carcinoma 10/13/2022    left neck   • Collagen disorder (HCC)     Suspected but not yet diagnosed   Waiting for genetic testing   • Eczema    • GERD (gastroesophageal reflux disease)    • Osteoarthritis     hips, knees   • Pneumonia    • Squamous cell skin cancer 2022    right cheek, inferior   • Tarlov cysts    • TMJ arthritis     more severe on the right   • Urinary tract infection 2019    treated with Macrobid 100 mg   • Varicella 1965   • Wears hearing aid in both ears        Past Surgical History:   Procedure Laterality Date   • COLPOPEXY VAGINAL EXTRAPERITONEAL (VEC) N/A 10/29/2020    Procedure: Bibiana Mayen;  Surgeon: Siva Carrion MD;  Location: [facility];  Service: UroGynecology          • COLPORRHAPHY N/A 10/29/2020    Procedure: A&P COLPORRHAPHY;  Surgeon: Siva Carrion MD;  Location[facility];  Service: UroGynecology          • [place] N/A 10/29/2020    Procedure: Kaycee Taylor;  Surgeon: Siva Carrion MD;  Location: [facility];  Service: UroGynecology          • JOINT REPLACEMENT Bilateral     hip   • LAMINECTOMY  2019    Surgery from about L5-S4 for Tarlov cysts   • MOHS SURGERY Right 10/19/2022    SCCIS right cheek, inferior-Dr Romo   • MOHS SURGERY Left 2022    [address] left neck-Dr Romo   • PUBOVAGINAL SLING N/A 10/29/2020    Procedure: PV SLING;  Surgeon: Siva Carrion MD;  Location: [facility];  Service: UroGynecology          • SKIN BIOPSY     • TOTAL HIP ARTHROPLASTY Left 02/15/2018   • TOTAL HIP ARTHROPLASTY Right 01/15/2019   • [address] Current Outpatient Medications:   •  acetaminophen (TYLENOL) 500 mg tablet, Take 500-1,000 mg by mouth every 6 (six) hours as needed , Disp: , Rfl:   •  ascorbic acid (VITAMIN C) 500 mg tablet, Take 500 mg by mouth daily, Disp: , Rfl:   •  Cholecalciferol (Vitamin D3) LIQD, Take 9,000 Units by mouth daily Takes as drops, Disp: , Rfl:   •  loratadine (CLARITIN) 10 mg tablet, Take 10 mg by mouth daily as needed , Disp: , Rfl:   •  phenylephrine (SUDAFED PE) 10 MG TABS, Take 10 mg by mouth every 4 (four) hours as needed for congestion, Disp: , Rfl:   •  Sennosides 17 2 MG TABS, Take 17 2 mg by mouth daily before dinner , Disp: , Rfl:   •  b complex vitamins tablet, Take 1 tablet by mouth daily, Disp: , Rfl:   •  cyclobenzaprine (FLEXERIL) 5 mg tablet, Take 1 tablet (5 mg total) by mouth daily at bedtime as needed for muscle spasms, Disp: 12 tablet, Rfl: 0  •  diphenhydrAMINE (BENADRYL) 25 mg capsule, Take 25 mg by mouth every 6 (six) hours as needed for itching, Disp: , Rfl:   •  docusate sodium (COLACE) 100 mg capsule, Take 1 capsule (100 mg total) by mouth 2 (two) times a day, Disp: 10 capsule, Rfl: 0  •  naltrexone (REVIA) 50 mg tablet, Take 4 5 mg by mouth daily Taken nightly, Disp: , Rfl:     Allergies   Allergen Reactions   • Sulfa Antibiotics Rash       Physical Exam:   Vitals:    12/06/22 0830   BP: 148/88   Pulse: 98   Temp: 98 1 °F (36 7 °C)   SpO2: 100%     General: Awake, Alert, Oriented x 3, Mood and affect appropriate  Respiratory: Respirations even and unlabored  Cardiovascular: Peripheral pulses intact; no edema  Musculoskeletal Exam: n/a    Skin: 1 7 cm x 0 7 cm pink pearly patch on the left neck    Assessment: Biopsy confirmed basal cell carcinoma of the left neck    Plan:  Mohs    MOHS Procedure Timeout    Flowsheet Row Most Recent Value   Timeout: 5644   Patient Identity Verified: Yes   Correct Site Verified: Yes   Correct Procedure Verified: Yes          MOHS Diagnosis/Indication/Location/ID Flowsheet Row Most Recent Value   Pathology Type Basal cell carcinoma   Anatomic Site left neck   Indications for MOHS tumor location   MOHS ID [id number]          MOHS Site/Accession/Pre-Post    Flowsheet Row Most Recent Value   Original Site Identified (as submitted by referring clinician) Photo   Biopsy Accession/Specimen # (as submitted by referring clincian) [id number]   Pre-MOHS Size Length (cm) 1 7   Pre-MOHS Size Width (cm) 0 7   Post-MOHS Size-Length (cm) 2 3   Post MOHS Size-Width (cm) 1 7   Repair Type Intermediate layered closure   Suture Type Monocryl plus, Vicryl   Monocryl Plus Suture Size 4   Vicryl Suture Size 4   Final repair length (cm): 5   Anesthetic Used 1% Lidocaine with epinephrine  [5cc with bicarb]          MOHS Tumor Stage 1 Information    Flowsheet Row Most Recent Value   Tissue Sections (blocks) 2   Microscopic Exam Section 1: No tumor identified in section  Incidental focus of intradermal melanocytic nests, well demarcated with nests trickling down follicle c/w an intradermal nevus with congenital features  Microscopic Exam Section 2: No tumor identified in section  Tumor Clear After Stage I? Yes                  Patient identified, procedure verified, site identified and verified  Time out completed  Surgical removal of the lesion discussed with the patient (risks and benefits, including possibility of scarring, infection, recurrence or potential for further treatment)  I have specifically identified the site with the patient  I have discussed the fact that the patient will have a scar after the procedure regardless of granulation or repair with sutures  I have discussed that the repair options can range from granulation in some cases to linear or curvilinear closures to larger flaps or grafts  There are sometimes flaps or grafts used that require multiples stages of surgery and will not be completed today, rather be completed over a series of appointments   I have discussed that occasionally due to location, size or depth of the lesion I may recommend consultation with and transfer of care for further removal or the reconstruction to another provider such as ophthalmology surgery, plastic surgery, ENT surgery, or surgical oncology  There are cases in which other testing such as imaging with MRI or CT scan or testing of lymph nodes is recommended because of the nature/depth/location of tumor seen during the removal  There is a risk of injury to nerves causing temporary or permanent numbness or the inability to move muscles full such as the inability to lift eyebrows  Questions answered and verbal and written consent was obtained  The tumor qualifies for Mohs based on AUC criteria  With the patient in the supine position and under adequate local anesthesia with 1% lidocaine with epinephrine 1:100,000, the defect was scrubbed with Hibiclens  Sterile drapes were placed from the sterile tray  Because of the location of the surgical defect, an intermediate closure was judged to give the best possible cosmetic and functional result  The edges of the defect were carefully debrided removing any dead or coagulated tissue  Hemostasis was obtained by pinpoint electrocoagulation  Careful planning of removal of redundant tissue at either end of the defect was drawn out so that the suture lines would fall in the optimal orientation with regard to the relaxed skin tension lines  These were then removed with a #15 blade scalpel  The wound was then approximated by a deep layer of buried vertical mattress sutures and the cutaneous margins were approximated and closed by superficial sutures as noted above  Estimated blood loss was less than 5 mL  The patient tolerated the procedure well  The wound was dressed with petrolatum, a non-stick pad, and a compression dressing  Yaakov Spencer MD served as the surgeon and pathologist during the procedure  Postoperative care:  Wound care discussed at length  I urged the patient to call us if any problems or question should arise  Complications: none  Post-op medications: none  Patient condition after procedure: stable  Discharge plans: Plan for follow up as needed at next scheduled appointment       Scribe Attestation    I,:  Ruth De Anda am acting as a scribe while in the presence of the attending physician :       I,:  Michelle Michelle MD personally performed the services described in this documentation    as scribed in my presence :

## 2023-11-16 ENCOUNTER — OFFICE VISIT (OUTPATIENT)
Dept: DERMATOLOGY | Facility: CLINIC | Age: 63
End: 2023-11-16

## 2023-11-16 VITALS — WEIGHT: 165.8 LBS | HEIGHT: 68 IN | TEMPERATURE: 97.2 F | BODY MASS INDEX: 25.13 KG/M2

## 2023-11-16 DIAGNOSIS — D18.01 CHERRY ANGIOMA: ICD-10-CM

## 2023-11-16 DIAGNOSIS — D22.9 MULTIPLE MELANOCYTIC NEVI: Primary | ICD-10-CM

## 2023-11-16 DIAGNOSIS — L81.4 LENTIGINES: ICD-10-CM

## 2023-11-16 DIAGNOSIS — D23.9 DERMATOFIBROMA: ICD-10-CM

## 2023-11-16 DIAGNOSIS — L57.0 KERATOSIS, ACTINIC: ICD-10-CM

## 2023-11-16 DIAGNOSIS — L85.3 XEROSIS OF SKIN: ICD-10-CM

## 2023-11-16 NOTE — PROGRESS NOTES
[facility] Note     Patient Name: Mello Chavez  Encounter Date: 11/16/23     Have you been cared for by a [facility]ologist in the last 3 years and, if so, which description applies to you? Yes. I have been here within the last 3 years, and my medical history has NOT changed since that time. I am FEMALE/of child-bearing potential.    REVIEW OF SYSTEMS:  Have you recently had or currently have any of the following? No changes in my recent health. PAST MEDICAL HISTORY:  Have you personally ever had or currently have any of the following? If "YES," then please provide more detail. No changes in my medical history. HISTORY OF IMMUNOSUPPRESSION: Do you have a history of any of the following:  Systemic Immunosuppression such as Diabetes, Biologic or Immunotherapy, Chemotherapy, Organ Transplantation, Bone Marrow Transplantation? No     Answering "YES" requires the addition of the dotphrase "IMMUNOSUPPRESSED" as the first diagnosis of the patient's visit. FAMILY HISTORY:  Any "first degree relatives" (parent, brother, sister, or child) with the following? No changes in my family's known health. PATIENT EXPERIENCE:    Do you want the Dermatologist to perform a COMPLETE skin exam today including a clinical examination under the "bra and underwear" areas? Yes-except bra or groin  If necessary, do we have your permission to call and leave a detailed message on your Preferred Phone number that includes your specific medical information?   Yes      Allergies   Allergen Reactions    Sulfa Antibiotics Rash      Current Outpatient Medications:     acetaminophen (TYLENOL) 500 mg tablet, Take 500-1,000 mg by mouth every 6 (six) hours as needed , Disp: , Rfl:     Cholecalciferol (Vitamin D3) LIQD, Take 9,000 Units by mouth daily Takes as drops, Disp: , Rfl:     loratadine (CLARITIN) 10 mg tablet, Take 10 mg by mouth daily as needed , Disp: , Rfl:     phenylephrine (SUDAFED PE) 10 MG TABS, Take 10 mg by mouth every 4 (four) hours as needed for congestion, Disp: , Rfl:     Sennosides 17.2 MG TABS, Take 17.2 mg by mouth daily before dinner , Disp: , Rfl:     ascorbic acid (VITAMIN C) 500 mg tablet, Take 500 mg by mouth daily (Patient not taking: Reported on 11/16/2023), Disp: , Rfl:     b complex vitamins tablet, Take 1 tablet by mouth daily, Disp: , Rfl:     cyclobenzaprine (FLEXERIL) 5 mg tablet, Take 1 tablet (5 mg total) by mouth daily at bedtime as needed for muscle spasms, Disp: 12 tablet, Rfl: 0    diphenhydrAMINE (BENADRYL) 25 mg capsule, Take 25 mg by mouth every 6 (six) hours as needed for itching, Disp: , Rfl:     docusate sodium (COLACE) 100 mg capsule, Take 1 capsule (100 mg total) by mouth 2 (two) times a day, Disp: 10 capsule, Rfl: 0    naltrexone (REVIA) 50 mg tablet, Take 4.5 mg by mouth daily Taken nightly, Disp: , Rfl:         Patient present for skin check, last seen 11/17/2023, spots of concern on left ear  and left cheek, right cheek tender. Ak's recheck on nose. Whom besides the patient is providing clinical information about today's encounter? NO ADDITIONAL HISTORIAN (patient alone provided history)    Physical Exam and Assessment/Plan by Diagnosis:  ACTINIC KERATOSIS    Physical Exam:  Anatomic Location Affected:  Left ear  Morphological Description:  Scaly pink papules  Pertinent Positives:  Pertinent Negatives:      Assessment and Plan:  Based on a thorough discussion of this condition and the management approach to it (including a comprehensive discussion of the known risks, side effects and potential benefits of treatment), the patient (family) agrees to implement the following specific plan:  When outside we recommend using a wide brim hat, sunglasses, long sleeve and pants, sunscreen with SPF 23+ with reapplication every 2 hours, or SPF specific clothing   liquid nitrogen to treat areas. Consent obtained.  Expect area to blister, crust, and then fall off within 2 weeks. Please use vaseline. PROCEDURE:  DESTRUCTION OF PRE-MALIGNANT LESIONS  After a thorough discussion of treatment options and risk/benefits/alternatives (including but not limited to local pain, scarring, dyspigmentation, blistering, and possible superinfection), verbal and written consent were obtained and the aforementioned lesions were treated on with cryotherapy using liquid nitrogen x 1 cycle for 5-10 seconds. TOTAL NUMBER of 1 pre-malignant lesions were treated today on the ANATOMIC LOCATION: Left ear. The patient tolerated the procedure well, and after-care instructions were provided. DERMATOFIBROMA    Physical Exam:  Anatomic Location Affected:  Left upper arm  Morphological Description:  Firm, skin colored papule  Pertinent Positives:  Pertinent Negatives: Additional History of Present Condition:  Present upon skin exam    Assessment and Plan:  Based on a thorough discussion of this condition and the management approach to it (including a comprehensive discussion of the known risks, side effects and potential benefits of treatment), the patient (family) agrees to implement the following specific plan:  Reassured benign    Assessment and Plan:  A dermatofibroma is a common benign fibrous nodule that most often arises on the skin of the lower legs. A dermatofibroma is also called a "cutaneous fibrous histiocytoma."  Dermatofibromas occur at all ages and in people of every ethnicity. They are more common in women than in men. It is not clear if dermatofibroma is a reactive process or if it is a neoplasm. The lesions are made up of proliferating fibroblasts. Histiocytes may also be involved. They are sometimes attributed to an insect bite or ingrownhair or local trauma, but not consistently. They may be more numerous in patients with altered immunity.     Dermatofibromas most often occur on the legs and arms, but may also arise on the trunk or any site of the body. Typical clinical features include the following:  People may have 1 or up to 15 lesions. Size varies from 0.5-1.5 cm diameter; most lesions are 7-10 mm diameter. They are firm nodules tethered to the skin surface and mobile over subcutaneous tissue. The skin "dimples" on pinching the lesion. Color may be pink to light brown in white skin, and dark brown to black in dark skin; some appear paler in the center. They do not usually cause symptoms, but they are sometimes painful or itchy. Because they are often raised lesions, they may be traumatized, for example by a razor. Occasionally dozens may erupt within a few months, usually in the setting of immunosuppression (for example autoimmune disease, cancer or certain medications). Dermatofibroma does not give rise to cancer. However, occasionally, it may be mistaken for dermatofibrosarcoma or desmoplastic melanoma. A dermatofibroma is harmless and seldom causes any symptoms. Usually, only reassurance is needed. If it is nuisance or causing concern, the lesion can be removed surgically, resulting in a scar that is, by definition, usually longer in diameter than the widest portion of the dermatofibroma. Cryotherapy, shave biopsy and laser surgery are rarely completely successful. Skin punch biopsy or incisional biopsy may be undertaken if there is an atypical feature such as recent enlargement, ulceration, or asymmetrical structures and colours on dermatoscopy. MELANOCYTIC NEVI ("Moles")    Physical Exam:  Anatomic Location Affected:   Mostly on sun-exposed areas of the trunk and extremities  Morphological Description:  Scattered, 1-4mm round to ovoid, symmetrical-appearing, even bordered, skin colored to dark brown macules/papules, mostly in sun-exposed areas  Pertinent Positives:  Pertinent Negatives:     Additional History of Present Condition:      Assessment and Plan:  Based on a thorough discussion of this condition and the management approach to it (including a comprehensive discussion of the known risks, side effects and potential benefits of treatment), the patient (family) agrees to implement the following specific plan:  When outside we recommend using a wide brim hat, sunglasses, long sleeve and pants, sunscreen with SPF 82+ with reapplication every 2 hours, or SPF specific clothing   Benign, reassured  Annual skin check     Melanocytic Nevi  Melanocytic nevi ("moles") are tan or brown, raised or flat areas of the skin which have an increased number of melanocytes. Melanocytes are the cells in our body which make pigment and account for skin color. Some moles are present at birth (I.e., "congenital nevi"), while others come up later in life (i.e., "acquired nevi"). The sun can stimulate the body to make more moles. Sunburns are not the only thing that triggers more moles. Chronic sun exposure can do it too. Clinically distinguishing a healthy mole from melanoma may be difficult, even for experienced dermatologists. The "ABCDE's" of moles have been suggested as a means of helping to alert a person to a suspicious mole and the possible increased risk of melanoma. The suggestions for raising alert are as follows:    Asymmetry: Healthy moles tend to be symmetric, while melanomas are often asymmetric. Asymmetry means if you draw a line through the mole, the two halves do not match in color, size, shape, or surface texture. Asymmetry can be a result of rapid enlargement of a mole, the development of a raised area on a previously flat lesion, scaling, ulceration, bleeding or scabbing within the mole. Any mole that starts to demonstrate "asymmetry" should be examined promptly by a board certified dermatologist.     Border: Healthy moles tend to have discrete, even borders. The border of a melanoma often blends into the normal skin and does not sharply delineate the mole from normal skin.   Any mole that starts to demonstrate "uneven borders" should be examined promptly by a board certified dermatologist.     Color: Healthy moles tend to be one color throughout. Melanomas tend to be made up of different colors ranging from dark black, blue, white, or red. Any mole that demonstrates a color change should be examined promptly by a board certified dermatologist.     Diameter: Healthy moles tend to be smaller than 0.6 cm in size; an exception are "congenital nevi" that can be larger. Melanomas tend to grow and can often be greater than 0.6 cm (1/4 of an inch, or the size of a pencil eraser). This is only a guideline, and many normal moles may be larger than 0.6 cm without being unhealthy. Any mole that starts to change in size (small to bigger or bigger to smaller) should be examined promptly by a board certified dermatologist.     Evolving: Healthy moles tend to "stay the same."  Melanomas may often show signs of change or evolution such as a change in size, shape, color, or elevation. Any mole that starts to itch, bleed, crust, burn, hurt, or ulcerate or demonstrate a change or evolution should be examined promptly by a board certified dermatologist.        LENTIGO    Physical Exam:  Anatomic Location Affected:  trunk, arms  Morphological Description:  Light brown macules  Pertinent Positives:  Pertinent Negatives: Additional History of Present Condition:      Assessment and Plan:  Based on a thorough discussion of this condition and the management approach to it (including a comprehensive discussion of the known risks, side effects and potential benefits of treatment), the patient (family) agrees to implement the following specific plan:  When outside we recommend using a wide brim hat, sunglasses, long sleeve and pants, sunscreen with SPF 92+ with reapplication every 2 hours, or SPF specific clothing       What is a lentigo? A lentigo is a pigmented flat or slightly raised lesion with a clearly defined edge.  Unlike an ephelis (freckle), it does not fade in the winter months. There are several kinds of lentigo. The name lentigo originally referred to its appearance resembling a small lentil. The plural of lentigo is lentigines, although “lentigos” is also in common use. Who gets lentigines? Lentigines can affect males and females of all ages and races. Solar lentigines are especially prevalent in fair skinned adults. Lentigines associated with syndromes are present at birth or arise during childhood. What causes lentigines? Common forms of lentigo are due to exposure to ultraviolet radiation:  Sun damage including sunburn   Indoor tanning   Phototherapy, especially photochemotherapy (PUVA)    Ionizing radiation, eg radiation therapy, can also cause lentigines. Several familial syndromes associated with widespread lentigines originate from mutations in Tadeo-MAP kinase, mTOR signaling and PTEN pathways. What is the treatment for lentigines? Most lentigines are left alone. Attempts to lighten them may not be successful. The following approaches are used:  SPF 50+ broad-spectrum sunscreen   Hydroquinone bleaching cream   Alpha hydroxy acids   Vitamin C   Retinoids   Azelaic acid   Chemical peels  Individual lesions can be permanently removed using:  Cryotherapy   Intense pulsed light   Pigment lasers    How can lentigines be prevented? Lentigines associated with exposure ultraviolet radiation can be prevented by very careful sun protection. Clothing is more successful at preventing new lentigines than are sunscreens. What is the outlook for lentigines? Lentigines usually persist. They may increase in number with age and sun exposure. Some in sun-protected sites may fade and disappear. CHERRY ANGIOMAS    Physical Exam:  Anatomic Location Affected:  trunk  Morphological Description:  Scattered cherry red, 1-4 mm papules. Pertinent Positives:  Pertinent Negatives:     Additional History of Present Condition:      Assessment and Plan:  Based on a thorough discussion of this condition and the management approach to it (including a comprehensive discussion of the known risks, side effects and potential benefits of treatment), the patient (family) agrees to implement the following specific plan:  Monitor for changes  Benign, reassured      Assessment and Plan:    Cherry angioma, also known as Ata Brim spots, are benign vascular skin lesions. A "cherry angioma" is a firm red, blue or purple papule, 0.1-1 cm in diameter. When thrombosed, they can appear black in colour until evaluated with a dermatoscope when the red or purple colour is more easily seen. Cherry angioma may develop on any part of the body but most often appear on the scalp, face, lips and trunk. An angioma is due to proliferating endothelial cells; these are the cells that line the inside of a blood vessel. Angiomas can arise in early life or later in life; the most common type of angioma is a cherry angioma. Cherry angiomas are very common in males and females of any age or race. They are more noticeable in white skin than in skin of colour. They markedly increase in number from about the age of 36. There may be a family history of similar lesions. Eruptive cherry angiomas have been rarely reported to be associated with internal malignancy. The cause of angiomas is unknown. Genetic analysis of cherry angiomas has shown that they frequently carry specific somatic missense mutations in the GNAQ and GNA11 (Q209H) genes, which are involved in other vascular and melanocytic proliferations. SEBORRHEIC KERATOSIS; NON-INFLAMED    Physical Exam:  Anatomic Location Affected:  trunk  Morphological Description:  Flat and raised, waxy, smooth to warty textured, yellow to brownish-grey to dark brown to blackish, discrete, "stuck-on" appearing papules. Pertinent Positives:  Pertinent Negatives:     Additional History of Present Condition:      Assessment and Plan:  Based on a thorough discussion of this condition and the management approach to it (including a comprehensive discussion of the known risks, side effects and potential benefits of treatment), the patient (family) agrees to implement the following specific plan:  Monitor for changes  Benign, reassured      Seborrheic Keratosis  A seborrheic keratosis is a harmless warty spot that appears during adult life as a common sign of skin aging. Seborrheic keratoses can arise on any area of skin, covered or uncovered, with the usual exception of the palms and soles. They do not arise from mucous membranes. Seborrheic keratoses can have highly variable appearance. Seborrheic keratoses are extremely common. It has been estimated that over 90% of adults over the age of 61 years have one or more of them. They occur in males and females of all races, typically beginning to erupt in the 35s or 45s. They are uncommon under the age of 21 years. The precise cause of seborrhoeic keratoses is not known. Seborrhoeic keratoses are considered degenerative in nature. As time goes by, seborrheic keratoses tend to become more numerous. Some people inherit a tendency to develop a very large number of them; some people may have hundreds of them. There is no easy way to remove multiple lesions on a single occasion. Unless a specific lesion is "inflamed" and is causing pain or stinging/burning or is bleeding, most insurance companies do not authorize treatment. XEROSIS ("DRY SKIN")    Physical Exam:  Anatomic Location Affected:  diffuse  Morphological Description:  xerosis  Pertinent Positives:  Pertinent Negatives:     Additional History of Present Condition:      Assessment and Plan:  Based on a thorough discussion of this condition and the management approach to it (including a comprehensive discussion of the known risks, side effects and potential benefits of treatment), the patient (family) agrees to implement the following specific plan:  Use moisturizer like Eucerin,Cerave or Aveeno Cream 3 times a day for the dry skin            Dry skin refers to skin that feels dry to touch. Dry skin has a dull surface with a rough, scaly quality. The skin is less pliable and cracked. When dryness is severe, the skin may become inflamed and fissured. Although any body site can be dry, dry skin tends to affect the shins more than any other site. Dry skin is lacking moisture in the outer horny cell layer (stratum corneum) and this results in cracks in the skin surface. Dry skin is also called xerosis, xeroderma or asteatosis (lack of fat). It can affect males and females of all ages. There is some racial variability in water and lipid content of the skin. Dry skin that starts in early childhood may be one of about 20 types of ichthyosis (fish-scale skin). There is often a family history of dry skin. Dry skin is commonly seen in people with atopic dermatitis. Nearly everyone > 60 years has dry skin. Dry skin that begins later may be seen in people with certain diseases and conditions. Postmenopausal women  Hypothyroidism  Chronic renal disease   Malnutrition and weight loss   Subclinical dermatitis   Treatment with certain drugs such as oral retinoids, diuretics and epidermal growth factor receptor inhibitors      What is the treatment for dry skin? The mainstay of treatment of dry skin and ichthyosis is moisturisers/emollients. They should be applied liberally and often enough to:  Reduce itch   Improve the barrier function   Prevent entry of irritants, bacteria   Reduce transepidermal water loss. How can dry skin be prevented? Eliminate aggravating factors:  Reduce the frequency of bathing. A humidifier in winter and air conditioner in summer   Compare having a short shower with a prolonged soak in a bath. Use lukewarm, not hot, water.    Replace standard soap with a substitute such as a synthetic detergent cleanser, water-miscible emollient, bath oil, anti-pruritic tar oil, colloidal oatmeal etc.   Apply an emollient liberally and often, particularly shortly after bathing, and when itchy. The drier the skin, the thicker this should be, especially on the hands. What is the outlook for dry skin? A tendency to dry skin may persist life-long, or it may improve once contributing factors are controlled.         Scribe Attestation      I,:  Dougie Vaca am acting as a scribe while in the presence of the attending physician.:       I,:  Liane Watkins MD personally performed the services described in this documentation    as scribed in my presence.:

## 2024-01-24 ENCOUNTER — APPOINTMENT (EMERGENCY)
Dept: RADIOLOGY | Facility: HOSPITAL | Age: 64
DRG: 287 | End: 2024-01-24
Payer: MEDICARE

## 2024-01-24 ENCOUNTER — HOSPITAL ENCOUNTER (INPATIENT)
Facility: HOSPITAL | Age: 64
LOS: 2 days | Discharge: HOME | DRG: 287 | End: 2024-01-27
Attending: STUDENT IN AN ORGANIZED HEALTH CARE EDUCATION/TRAINING PROGRAM | Admitting: INTERNAL MEDICINE
Payer: MEDICARE

## 2024-01-24 DIAGNOSIS — E78.5 HYPERLIPIDEMIA, UNSPECIFIED HYPERLIPIDEMIA TYPE: ICD-10-CM

## 2024-01-24 DIAGNOSIS — R07.9 CHEST PAIN: Primary | ICD-10-CM

## 2024-01-24 DIAGNOSIS — R10.13 EPIGASTRIC PAIN: ICD-10-CM

## 2024-01-24 DIAGNOSIS — I20.0 UNSTABLE ANGINA (HCC): ICD-10-CM

## 2024-01-24 LAB
2HR DELTA HS TROPONIN: 0 NG/L
4HR DELTA HS TROPONIN: 1 NG/L
ALBUMIN SERPL BCP-MCNC: 4.6 G/DL (ref 3.5–5)
ALP SERPL-CCNC: 42 U/L (ref 34–104)
ALT SERPL W P-5'-P-CCNC: 12 U/L (ref 7–52)
ANION GAP SERPL CALCULATED.3IONS-SCNC: 10 MMOL/L
AST SERPL W P-5'-P-CCNC: 24 U/L (ref 13–39)
BASOPHILS # BLD AUTO: 0.05 THOUSANDS/ÂΜL (ref 0–0.1)
BASOPHILS NFR BLD AUTO: 1 % (ref 0–1)
BILIRUB SERPL-MCNC: 0.71 MG/DL (ref 0.2–1)
BUN SERPL-MCNC: 23 MG/DL (ref 5–25)
CALCIUM SERPL-MCNC: 9.9 MG/DL (ref 8.4–10.2)
CARDIAC TROPONIN I PNL SERPL HS: 4 NG/L
CARDIAC TROPONIN I PNL SERPL HS: 4 NG/L
CARDIAC TROPONIN I PNL SERPL HS: 5 NG/L
CHLORIDE SERPL-SCNC: 102 MMOL/L (ref 96–108)
CO2 SERPL-SCNC: 25 MMOL/L (ref 21–32)
CREAT SERPL-MCNC: 0.66 MG/DL (ref 0.6–1.3)
EOSINOPHIL # BLD AUTO: 0.07 THOUSAND/ÂΜL (ref 0–0.61)
EOSINOPHIL NFR BLD AUTO: 1 % (ref 0–6)
ERYTHROCYTE [DISTWIDTH] IN BLOOD BY AUTOMATED COUNT: 12.6 % (ref 11.6–15.1)
GFR SERPL CREATININE-BSD FRML MDRD: 92 ML/MIN/1.73SQ M
GLUCOSE SERPL-MCNC: 73 MG/DL (ref 65–140)
HCT VFR BLD AUTO: 41.9 % (ref 34.8–46.1)
HGB BLD-MCNC: 13.6 G/DL (ref 11.5–15.4)
IMM GRANULOCYTES # BLD AUTO: 0.01 THOUSAND/UL (ref 0–0.2)
IMM GRANULOCYTES NFR BLD AUTO: 0 % (ref 0–2)
LYMPHOCYTES # BLD AUTO: 2.01 THOUSANDS/ÂΜL (ref 0.6–4.47)
LYMPHOCYTES NFR BLD AUTO: 26 % (ref 14–44)
MCH RBC QN AUTO: 31.6 PG (ref 26.8–34.3)
MCHC RBC AUTO-ENTMCNC: 32.5 G/DL (ref 31.4–37.4)
MCV RBC AUTO: 97 FL (ref 82–98)
MONOCYTES # BLD AUTO: 0.55 THOUSAND/ÂΜL (ref 0.17–1.22)
MONOCYTES NFR BLD AUTO: 7 % (ref 4–12)
NEUTROPHILS # BLD AUTO: 5.14 THOUSANDS/ÂΜL (ref 1.85–7.62)
NEUTS SEG NFR BLD AUTO: 65 % (ref 43–75)
NRBC BLD AUTO-RTO: 0 /100 WBCS
PLATELET # BLD AUTO: 265 THOUSANDS/UL (ref 149–390)
PMV BLD AUTO: 9.2 FL (ref 8.9–12.7)
POTASSIUM SERPL-SCNC: 4.3 MMOL/L (ref 3.5–5.3)
PROT SERPL-MCNC: 8.1 G/DL (ref 6.4–8.4)
RBC # BLD AUTO: 4.3 MILLION/UL (ref 3.81–5.12)
SODIUM SERPL-SCNC: 137 MMOL/L (ref 135–147)
WBC # BLD AUTO: 7.83 THOUSAND/UL (ref 4.31–10.16)

## 2024-01-24 PROCEDURE — 99223 1ST HOSP IP/OBS HIGH 75: CPT | Performed by: PHYSICIAN ASSISTANT

## 2024-01-24 PROCEDURE — 99285 EMERGENCY DEPT VISIT HI MDM: CPT | Performed by: STUDENT IN AN ORGANIZED HEALTH CARE EDUCATION/TRAINING PROGRAM

## 2024-01-24 PROCEDURE — 36415 COLL VENOUS BLD VENIPUNCTURE: CPT

## 2024-01-24 PROCEDURE — 83036 HEMOGLOBIN GLYCOSYLATED A1C: CPT | Performed by: NURSE PRACTITIONER

## 2024-01-24 PROCEDURE — 71045 X-RAY EXAM CHEST 1 VIEW: CPT

## 2024-01-24 PROCEDURE — 84484 ASSAY OF TROPONIN QUANT: CPT | Performed by: PHYSICIAN ASSISTANT

## 2024-01-24 PROCEDURE — 80061 LIPID PANEL: CPT | Performed by: NURSE PRACTITIONER

## 2024-01-24 PROCEDURE — 80053 COMPREHEN METABOLIC PANEL: CPT | Performed by: STUDENT IN AN ORGANIZED HEALTH CARE EDUCATION/TRAINING PROGRAM

## 2024-01-24 PROCEDURE — 99285 EMERGENCY DEPT VISIT HI MDM: CPT

## 2024-01-24 PROCEDURE — 85025 COMPLETE CBC W/AUTO DIFF WBC: CPT | Performed by: STUDENT IN AN ORGANIZED HEALTH CARE EDUCATION/TRAINING PROGRAM

## 2024-01-24 PROCEDURE — 99223 1ST HOSP IP/OBS HIGH 75: CPT | Performed by: INTERNAL MEDICINE

## 2024-01-24 PROCEDURE — 84484 ASSAY OF TROPONIN QUANT: CPT | Performed by: STUDENT IN AN ORGANIZED HEALTH CARE EDUCATION/TRAINING PROGRAM

## 2024-01-24 PROCEDURE — 1124F ACP DISCUSS-NO DSCNMKR DOCD: CPT | Performed by: STUDENT IN AN ORGANIZED HEALTH CARE EDUCATION/TRAINING PROGRAM

## 2024-01-24 PROCEDURE — 84443 ASSAY THYROID STIM HORMONE: CPT | Performed by: NURSE PRACTITIONER

## 2024-01-24 PROCEDURE — 93005 ELECTROCARDIOGRAM TRACING: CPT

## 2024-01-24 RX ORDER — ASPIRIN 81 MG/1
81 TABLET, CHEWABLE ORAL ONCE
Status: COMPLETED | OUTPATIENT
Start: 2024-01-24 | End: 2024-01-24

## 2024-01-24 RX ORDER — ASPIRIN 81 MG/1
162 TABLET, CHEWABLE ORAL ONCE
Status: COMPLETED | OUTPATIENT
Start: 2024-01-24 | End: 2024-01-24

## 2024-01-24 RX ORDER — ACETAMINOPHEN 325 MG/1
650 TABLET ORAL EVERY 6 HOURS PRN
Status: DISCONTINUED | OUTPATIENT
Start: 2024-01-24 | End: 2024-01-27 | Stop reason: HOSPADM

## 2024-01-24 RX ORDER — ONDANSETRON 2 MG/ML
4 INJECTION INTRAMUSCULAR; INTRAVENOUS EVERY 6 HOURS PRN
Status: DISCONTINUED | OUTPATIENT
Start: 2024-01-24 | End: 2024-01-27 | Stop reason: HOSPADM

## 2024-01-24 RX ORDER — ENOXAPARIN SODIUM 100 MG/ML
40 INJECTION SUBCUTANEOUS DAILY
Status: DISCONTINUED | OUTPATIENT
Start: 2024-01-25 | End: 2024-01-27 | Stop reason: HOSPADM

## 2024-01-24 RX ADMIN — ASPIRIN 81 MG CHEWABLE TABLET 81 MG: 81 TABLET CHEWABLE at 14:24

## 2024-01-24 RX ADMIN — ASPIRIN 81 MG CHEWABLE TABLET 162 MG: 81 TABLET CHEWABLE at 14:49

## 2024-01-24 RX ADMIN — ACETAMINOPHEN 650 MG: 325 TABLET, FILM COATED ORAL at 18:33

## 2024-01-24 NOTE — ASSESSMENT & PLAN NOTE
Suspect present on admission, patient with prior intermittent almost random chest pain progressing to more frequent, persistent, and intense chest pain that would occur with exertion and rest.   Prior stress test 5-6 years old was negative    reports prior negative CT Coronary Score   Initial troponin, EKG negative for ischemia  Story concerning for angina   Admit patient to med/surg under observation status  Consult Cardiology due to concerning story   Trend troponin   Pharmacologic Nuclear Stress   Monitor symptoms   Telemetry

## 2024-01-24 NOTE — H&P
[facility]  H&P  Name: Celeste Bryant 63 y.o. female I MRN: [id number]  Unit/Bed#: ED-32 I Date of Admission: 1/24/2024   Date of Service: 1/24/2024 I Hospital Day: 0      Assessment/Plan   * Unstable angina (HCC)  Assessment & Plan  Suspect present on admission, patient with prior intermittent almost random chest pain progressing to more frequent, persistent, and intense chest pain that would occur with exertion and rest.   Prior stress test 5-6 years old was negative    reports prior negative CT Coronary Score   Initial troponin, EKG negative for ischemia  Story concerning for angina   Admit patient to med/surg under observation status  Consult Cardiology due to concerning story   Trend troponin   Pharmacologic Nuclear Stress   Monitor symptoms   Telemetry     Ana jerry  Assessment & Plan  Reports prior surgery at [facility] with poor healing of muscles. States that she could walk on a treadmill, but would have to hold on tightly   Will order pharmacologic stress test to ensure adequate study quality            VTE Pharmacologic Prophylaxis: VTE Score: 4 Moderate Risk (Score 3-4) - Pharmacological DVT Prophylaxis Ordered: enoxaparin (Lovenox).  Code Status: Full Code   Discussion with family: Updated  () at bedside.    Anticipated Length of Stay: Patient will be admitted on an observation basis with an anticipated length of stay of less than 2 midnights secondary to as per above assessment and plan .    Total Time Spent on Date of Encounter in care of patient: 75 mins. This time was spent on one or more of the following: performing physical exam; counseling and coordination of care; obtaining or reviewing history; documenting in the medical record; reviewing/ordering tests, medications or procedures; communicating with other healthcare professionals and discussing with patient's family/caregivers.    Chief Complaint: Chest Pain     History of Present  Illness:  Celeste Bryant is a 63 y.o. female with no significant past medical history who presents with chest pain. Patient reports chest pain occurring in the past very randomly. Too sporadic to be concerning, but enough for her to notice. She reports more recently that her pain has been coming on more frequently, more intensely, and lasting longer. She has noted it specifically with exertion, especially during shoveling the recent heavy snow. She states that she has also gotten symptoms while working. She also reports symptoms that have occurred at rest. She reports left sided chest pain that has radiated up into her lower jaw as well as into her left arm that she describes as cramping. She has reported some shortness of breath as well. Denies dizziness, leg swelling, nausea or vomiting. She reports trying antacids and aspirin intermittently without any effect. Reports remote stress test and coronary CT score that were reassuring. She denies significant smoking history. Denies family history of CAD, but reports her mother has CHF which occurred later in life. Still reports some symptoms.     Review of Systems:  Review of Systems   Constitutional:  Negative for appetite change, chills, diaphoresis, fatigue and fever.   HENT:  Negative for congestion, rhinorrhea and sore throat.    Eyes:  Negative for visual disturbance.   Respiratory:  Positive for shortness of breath. Negative for cough, chest tightness and wheezing.    Cardiovascular:  Positive for chest pain. Negative for palpitations and leg swelling.   Gastrointestinal:  Negative for abdominal pain, constipation, diarrhea, nausea and vomiting.   Genitourinary:  Negative for dysuria.   Musculoskeletal:  Negative for arthralgias and myalgias.   Neurological:  Negative for dizziness, syncope, weakness, light-headedness, numbness and headaches.   All other systems reviewed and are negative.      Past Medical and Surgical History:   Past Medical History:   Diagnosis  Date    Actinic keratosis     Allergic     Anesthesia complication     after 1 THR had low BP and Heart rate    Arthritis     Basal cell carcinoma 10/13/2022    left neck    Collagen disorder (HCC)     Suspected but not yet diagnosed. Waiting for genetic testing    Eczema     GERD (gastroesophageal reflux disease)     Osteoarthritis     hips, knees    Pneumonia     Squamous cell skin cancer 09/29/2022    right cheek, inferior    Tarlov cysts     TMJ arthritis     more severe on the right    Urinary tract infection 09/06/2019    treated with Macrobid 100 mg    Varicella 1965    Wears hearing aid in both ears        Past Surgical History:   Procedure Laterality Date    COLPOPEXY VAGINAL EXTRAPERITONEAL (VEC) N/A 10/29/2020    Procedure: VEC;  Surgeon: Wolfgang Salamanca MD;  Location: [facility];  Service: UroGynecology           COLPORRHAPHY N/A 10/29/2020    Procedure: A&P COLPORRHAPHY;  Surgeon: Wolfgang Salamanca MD;  Location: [facility];  Service: UroGynecology           CYSTOSCOPY  1983    CYSTOSCOPY N/A 10/29/2020    Procedure: CYSTO;  Surgeon: Wolfgang Salamanca MD;  Location: [facility];  Service: UroGynecology           JOINT REPLACEMENT Bilateral     hip    LAMINECTOMY  08/2019    Surgery from about L5-S4 for Tarlov cysts    MOHS SURGERY Right 10/19/2022    SCCIS right cheek, inferior-Dr Romo    MOHS SURGERY Left 12/06/2022    BCC left neck-Dr Romo    PUBOVAGINAL SLING N/A 10/29/2020    Procedure: PV SLING;  Surgeon: Wolfgang Salamanca MD;  Location: [facility];  Service: UroGynecology           SKIN BIOPSY      TOTAL HIP ARTHROPLASTY Left 02/15/2018    TOTAL HIP ARTHROPLASTY Right 01/15/2019    URETHRAL DILATION  1983       Meds/Allergies:  Prior to Admission medications    Medication Sig Start Date End Date Taking? Authorizing Provider   acetaminophen (TYLENOL) 500 mg tablet Take 500-1,000 mg by mouth every 6 (six) hours as needed  9/3/19   Historical Provider, MD   ascorbic acid (VITAMIN C) 500 mg tablet Take 500 mg by  mouth daily  Patient not taking: Reported on 2023    Historical Provider, MD   b complex vitamins tablet Take 1 tablet by mouth daily    Historical Provider, MD   Cholecalciferol (Vitamin D3) LIQD Take 9,000 Units by mouth daily Takes as drops    Historical Provider, MD   cyclobenzaprine (FLEXERIL) 5 mg tablet Take 1 tablet (5 mg total) by mouth daily at bedtime as needed for muscle spasms 10/30/20   Fredy Mccracken DO   diphenhydrAMINE (BENADRYL) 25 mg capsule Take 25 mg by mouth every 6 (six) hours as needed for itching    Historical Provider, MD   docusate sodium (COLACE) 100 mg capsule Take 1 capsule (100 mg total) by mouth 2 (two) times a day 10/30/20   Fredy Mccracken DO   loratadine (CLARITIN) 10 mg tablet Take 10 mg by mouth daily as needed     Historical Provider, MD   naltrexone (REVIA) 50 mg tablet Take 4.5 mg by mouth daily Taken nightly    Historical Provider, MD   phenylephrine (SUDAFED PE) 10 MG TABS Take 10 mg by mouth every 4 (four) hours as needed for congestion    Historical Provider, MD   Sennosides 17.2 MG TABS Take 17.2 mg by mouth daily before dinner  9/3/19   Historical Provider, MD JENKINS have reviewed home medications with patient personally.    Allergies:   Allergies   Allergen Reactions    Sulfa Antibiotics Rash       Social History:  Marital Status: /Civil Union   Occupation: Noncontributory   Patient Pre-hospital Living Situation: Home  Patient Pre-hospital Level of Mobility: walks  Patient Pre-hospital Diet Restrictions: None  Substance Use History:   Social History     Substance and Sexual Activity   Alcohol Use No     Social History     Tobacco Use   Smoking Status Former    Current packs/day: 0.00    Average packs/day: 1 pack/day for 3.0 years (3.0 ttl pk-yrs)    Types: Cigarettes    Start date:     Quit date:     Years since quittin.0   Smokeless Tobacco Never     Social History     Substance and Sexual Activity   Drug Use No       Family History:  Family  History   Problem Relation Age of Onset    Hypertension Mother     Diabetes Mother     Cancer Mother         breast    Coronary artery disease Mother     Arrhythmia Mother     Clotting disorder Mother         clot in leg    Breast cancer Mother 65    Heart failure Mother         Stage 3 CHF    Hyperlipidemia Mother     Cancer Father         lung    Osteoarthritis Father     Hyperlipidemia Sister     Diabetes Paternal Grandmother     Diabetes Paternal Grandfather     Crohn's disease Sister     Migraines Son     No Known Problems Daughter     No Known Problems Sister     No Known Problems Daughter     No Known Problems Maternal Aunt     No Known Problems Maternal Aunt     No Known Problems Maternal Aunt     No Known Problems Maternal Aunt     Stroke Neg Hx     Anuerysm Neg Hx        Physical Exam:     Vitals:   Blood Pressure: 146/79 (01/24/24 1400)  Pulse: 65 (01/24/24 1400)  Temperature: (!) 97.4 °F (36.3 °C) (01/24/24 1334)  Respirations: 18 (01/24/24 1400)  SpO2: 96 % (01/24/24 1400)    Physical Exam  Constitutional:       General: She is not in acute distress.     Appearance: Normal appearance. She is normal weight. She is not ill-appearing or diaphoretic.   HENT:      Head: Normocephalic and atraumatic.      Mouth/Throat:      Mouth: Mucous membranes are moist.   Eyes:      General: No scleral icterus.     Pupils: Pupils are equal, round, and reactive to light.   Cardiovascular:      Rate and Rhythm: Normal rate and regular rhythm.      Pulses: Normal pulses.      Heart sounds: Normal heart sounds, S1 normal and S2 normal. No murmur heard.     No systolic murmur is present.      No diastolic murmur is present.      No gallop. No S3 or S4 sounds.   Pulmonary:      Effort: Pulmonary effort is normal. No accessory muscle usage or respiratory distress.      Breath sounds: Normal breath sounds. No stridor. No decreased breath sounds, wheezing, rhonchi or rales.   Chest:      Chest wall: No tenderness.   Abdominal:       General: Bowel sounds are normal. There is no distension.      Palpations: Abdomen is soft.      Tenderness: There is no abdominal tenderness. There is no guarding.   Musculoskeletal:      Right lower leg: No edema.      Left lower leg: No edema.   Skin:     General: Skin is warm and dry.      Coloration: Skin is not jaundiced.   Neurological:      General: No focal deficit present.      Mental Status: She is alert. Mental status is at baseline.      Motor: No tremor or seizure activity.   Psychiatric:         Behavior: Behavior is cooperative.          Additional Data:     Lab Results:  Results from last 7 days   Lab Units 01/24/24  1428   WBC Thousand/uL 7.83   HEMOGLOBIN g/dL 13.6   HEMATOCRIT % 41.9   PLATELETS Thousands/uL 265   NEUTROS PCT % 65   LYMPHS PCT % 26   MONOS PCT % 7   EOS PCT % 1     Results from last 7 days   Lab Units 01/24/24  1342   SODIUM mmol/L 137   POTASSIUM mmol/L 4.3   CHLORIDE mmol/L 102   CO2 mmol/L 25   BUN mg/dL 23   CREATININE mg/dL 0.66   ANION GAP mmol/L 10   CALCIUM mg/dL 9.9   ALBUMIN g/dL 4.6   TOTAL BILIRUBIN mg/dL 0.71   ALK PHOS U/L 42   ALT U/L 12   AST U/L 24   GLUCOSE RANDOM mg/dL 73                       Lines/Drains:  Invasive Devices       Peripheral Intravenous Line  Duration             Peripheral IV 01/24/24 Right;Ventral (anterior) Forearm <1 day              Drain  Duration             Urethral Catheter Latex 16 Fr. 1181 days                  Urinary Catheter:  Goal for removal:  No Cruz              Imaging: Personally reviewed the following imaging: chest xray  XR chest 1 view portable   ED Interpretation by Laura Andersen MD (01/24 9333)   No acute cardiopulmonary process          EKG and Other Studies Reviewed on Admission:   EKG:  SR with occasional PVCs, 64 BPM.  CXR: No acute pulmonary disease - My personal read     ** Please Note: This note has been constructed using a voice recognition system. **

## 2024-01-24 NOTE — ED ATTENDING ATTESTATION
1/24/2024  I, Teja Ceja DO, saw and evaluated the patient. I have discussed the patient with the resident/non-physician practitioner and agree with the resident's/non-physician practitioner's findings, Plan of Care, and MDM as documented in the resident's/non-physician practitioner's note, except where noted. All available labs and Radiology studies were reviewed.  I was present for key portions of any procedure(s) performed by the resident/non-physician practitioner and I was immediately available to provide assistance.       At this point I agree with the current assessment done in the Emergency Department.  I have conducted an independent evaluation of this patient a history and physical is as follows:    63-year-old female presents to the ED for evaluation of chest pain.  Symptoms started 3 weeks ago and has been intermittent since onset.  Worsens with exertion.  Last had a stress test done in 2018 which was reported to be normal.  Has been seen by primary care for her symptoms who encouraged cardiology follow-up however she has been unable to obtain this follow-up as of now.  On exam, resting comfortably no acute distress, speaking full sentences without respiratory difficulty, moving all extremities with no gross motor deficit.  EKG is nonischemic as interpreted by myself, appears to be a sinus rhythm with single PVC noted.  Labs show no leukocytosis, stable hemoglobin, no acute electrolyte abnormalities, normal renal function, no acute LFT abnormalities, initial troponin is 4.  Chest x-ray shows no acute cardiopulmonary pathology as interpreted by myself pending final radiology read.  Heart score of 6: 2 for history, 1 for EKG, 2 for age, 1 for risk factors, 0 for troponin.  Plan will be for hospitalization.  Patient agreeable to the plan.  Admitting team consulted.  Accepted onto their service for further care and management.  Stable at the time of disposition    ED Course         Critical Care  Time  Procedures

## 2024-01-24 NOTE — ED PROVIDER NOTES
"History  Chief Complaint   Patient presents with    Chest Pain     Chest pain coming and going x3 weeks. Pt reports worsening and more persistent pain described as pressure or \"stinging\". +SOB.      63-year-old female with past medical history of GERD presents for evaluation of left-sided chest pain that radiates sometimes into the left shoulder and left arm x 3 weeks.  Patient states that she initially felt chest pain while shoveling snow and since then has had intermittent occurrences of chest pain mostly with exertion however most recently with rest as well.  Reports associated symptoms of intermittent diaphoresis, lightheadedness and shortness of breath during chest pain episodes.  She reports experiencing chest pain currently.  Because of her history of GERD, she states that she has taken an antacid a few times intermittently during the course of the past 3 weeks however this did not help to resolve symptoms.  Denies associated symptoms of fever/chills, lower extremity edema, headache, vision changes, back pain, abdominal pain, vomiting, diarrhea or any other symptoms.  Has not been evaluated by cardiologist for some time.  No other concerns.        Prior to Admission Medications   Prescriptions Last Dose Informant Patient Reported? Taking?   Cholecalciferol (Vitamin D3) LIQD   Yes No   Sig: Take 9,000 Units by mouth daily Takes as drops   Sennosides 17.2 MG TABS   Yes No   Sig: Take 17.2 mg by mouth daily before dinner    acetaminophen (TYLENOL) 500 mg tablet   Yes No   Sig: Take 500-1,000 mg by mouth every 6 (six) hours as needed    ascorbic acid (VITAMIN C) 500 mg tablet   Yes No   Sig: Take 500 mg by mouth daily   Patient not taking: Reported on 11/16/2023   b complex vitamins tablet   Yes No   Sig: Take 1 tablet by mouth daily   cyclobenzaprine (FLEXERIL) 5 mg tablet   No No   Sig: Take 1 tablet (5 mg total) by mouth daily at bedtime as needed for muscle spasms   diphenhydrAMINE (BENADRYL) 25 mg capsule   " Yes No   Sig: Take 25 mg by mouth every 6 (six) hours as needed for itching   docusate sodium (COLACE) 100 mg capsule   No No   Sig: Take 1 capsule (100 mg total) by mouth 2 (two) times a day   loratadine (CLARITIN) 10 mg tablet  Self Yes No   Sig: Take 10 mg by mouth daily as needed    naltrexone (REVIA) 50 mg tablet  Self Yes No   Sig: Take 4.5 mg by mouth daily Taken nightly   phenylephrine (SUDAFED PE) 10 MG TABS  Self Yes No   Sig: Take 10 mg by mouth every 4 (four) hours as needed for congestion      Facility-Administered Medications: None       Past Medical History:   Diagnosis Date    Actinic keratosis     Allergic     Anesthesia complication     after 1 THR had low BP and Heart rate    Arthritis     Basal cell carcinoma 10/13/2022    left neck    Collagen disorder (HCC)     Suspected but not yet diagnosed. Waiting for genetic testing    Eczema     GERD (gastroesophageal reflux disease)     Osteoarthritis     hips, knees    Pneumonia     Squamous cell skin cancer 09/29/2022    right cheek, inferior    Tarlov cysts     TMJ arthritis     more severe on the right    Urinary tract infection 09/06/2019    treated with Macrobid 100 mg    Varicella 1965    Wears hearing aid in both ears        Past Surgical History:   Procedure Laterality Date    COLPOPEXY VAGINAL EXTRAPERITONEAL (VEC) N/A 10/29/2020    Procedure: VEC;  Surgeon: Wolfgang Salamanca MD;  Location: [facility];  Service: UroGynecology           COLPORRHAPHY N/A 10/29/2020    Procedure: A&P COLPORRHAPHY;  Surgeon: Wolfgang Salamanca MD;  Location: [facility];  Service: UroGynecology           CYSTOSCOPY  1983    CYSTOSCOPY N/A 10/29/2020    Procedure: CYSTO;  Surgeon: Wolfgang Salamanca MD;  Location: [facility];  Service: UroGynecology           JOINT REPLACEMENT Bilateral     hip    LAMINECTOMY  08/2019    Surgery from about L5-S4 for Tarlov cysts    MOHS SURGERY Right 10/19/2022    SCCIS right cheek, inferior-Dr Romo    MOHS SURGERY Left 12/06/2022    BCC left  neck-Dr Romo    PUBOVAGINAL SLING N/A 10/29/2020    Procedure: PV SLING;  Surgeon: Wolfgang Salamanca MD;  Location: [facility];  Service: UroGynecology           SKIN BIOPSY      TOTAL HIP ARTHROPLASTY Left 02/15/2018    TOTAL HIP ARTHROPLASTY Right 01/15/2019    URETHRAL DILATION  1983       Family History   Problem Relation Age of Onset    Hypertension Mother     Diabetes Mother     Cancer Mother         breast    Coronary artery disease Mother     Arrhythmia Mother     Clotting disorder Mother         clot in leg    Breast cancer Mother 65    Heart failure Mother         Stage 3 CHF    Hyperlipidemia Mother     Cancer Father         lung    Osteoarthritis Father     Hyperlipidemia Sister     Diabetes Paternal Grandmother     Diabetes Paternal Grandfather     Crohn's disease Sister     Migraines Son     No Known Problems Daughter     No Known Problems Sister     No Known Problems Daughter     No Known Problems Maternal Aunt     No Known Problems Maternal Aunt     No Known Problems Maternal Aunt     No Known Problems Maternal Aunt     Stroke Neg Hx     Anuerysm Neg Hx      I have reviewed and agree with the history as documented.    E-Cigarette/Vaping    E-Cigarette Use Never User      E-Cigarette/Vaping Substances    Nicotine No     THC No     CBD No      Social History     Tobacco Use    Smoking status: Former     Current packs/day: 0.00     Average packs/day: 1 pack/day for 3.0 years (3.0 ttl pk-yrs)     Types: Cigarettes     Start date:      Quit date:      Years since quittin.0    Smokeless tobacco: Never   Vaping Use    Vaping status: Never Used   Substance Use Topics    Alcohol use: No    Drug use: No        Review of Systems   Constitutional:  Positive for diaphoresis. Negative for chills and fever.   HENT:  Negative for ear pain and sore throat.    Eyes:  Negative for pain and visual disturbance.   Respiratory:  Positive for shortness of breath. Negative for cough.    Cardiovascular:  Positive  for chest pain. Negative for palpitations.   Gastrointestinal:  Negative for abdominal pain, diarrhea, nausea and vomiting.   Genitourinary:  Negative for dysuria and hematuria.   Musculoskeletal:  Negative for arthralgias and back pain.   Skin:  Negative for color change and rash.   Neurological:  Positive for light-headedness. Negative for seizures, syncope and headaches.   All other systems reviewed and are negative.      Physical Exam  ED Triage Vitals   Temperature Pulse Respirations Blood Pressure SpO2   01/24/24 1334 01/24/24 1334 01/24/24 1334 01/24/24 1334 01/24/24 1334   (!) 97.4 °F (36.3 °C) 86 18 (!) 179/91 100 %      Temp src Heart Rate Source Patient Position - Orthostatic VS BP Location FiO2 (%)   -- 01/24/24 1400 01/24/24 1400 01/24/24 1400 --    Monitor Sitting Right arm       Pain Score       01/24/24 1334       5             Orthostatic Vital Signs  Vitals:    01/24/24 1334 01/24/24 1400   BP: (!) 179/91 146/79   Pulse: 86 65   Patient Position - Orthostatic VS:  Sitting       Physical Exam  Vitals and nursing note reviewed.   Constitutional:       General: She is not in acute distress.     Appearance: She is well-developed. She is not ill-appearing, toxic-appearing or diaphoretic.   HENT:      Head: Normocephalic and atraumatic.   Eyes:      Extraocular Movements: Extraocular movements intact.      Conjunctiva/sclera: Conjunctivae normal.   Cardiovascular:      Rate and Rhythm: Normal rate and regular rhythm.      Heart sounds: Normal heart sounds. No murmur heard.  Pulmonary:      Effort: Pulmonary effort is normal. No respiratory distress.      Breath sounds: Normal breath sounds.   Chest:      Chest wall: No tenderness or crepitus.      Comments: No overlying skin changes.  Abdominal:      Palpations: Abdomen is soft.      Tenderness: There is no abdominal tenderness.   Musculoskeletal:         General: No swelling. Normal range of motion.      Cervical back: Normal range of motion and neck  supple.      Right lower leg: No tenderness. No edema.      Left lower leg: No tenderness. No edema.      Comments: No tenderness to palpation over the left shoulder or with range of motion of the left upper extremity whatsoever.   Skin:     General: Skin is warm and dry.      Capillary Refill: Capillary refill takes less than 2 seconds.   Neurological:      General: No focal deficit present.      Mental Status: She is alert and oriented to person, place, and time.   Psychiatric:         Mood and Affect: Mood normal.         Behavior: Behavior normal.         ED Medications  Medications   aspirin chewable tablet 81 mg (81 mg Oral Given 1/24/24 1424)   aspirin chewable tablet 162 mg (162 mg Oral Given 1/24/24 1449)       Diagnostic Studies  Results Reviewed       Procedure Component Value Units Date/Time    CBC and differential [[id number]] Collected: 01/24/24 1428    Lab Status: Final result Specimen: Blood from Arm, Left Updated: 01/24/24 1446     WBC 7.83 Thousand/uL      RBC 4.30 Million/uL      Hemoglobin 13.6 g/dL      Hematocrit 41.9 %      MCV 97 fL      MCH 31.6 pg      MCHC 32.5 g/dL      RDW 12.6 %      MPV 9.2 fL      Platelets 265 Thousands/uL      nRBC 0 /100 WBCs      Neutrophils Relative 65 %      Immat GRANS % 0 %      Lymphocytes Relative 26 %      Monocytes Relative 7 %      Eosinophils Relative 1 %      Basophils Relative 1 %      Neutrophils Absolute 5.14 Thousands/µL      Immature Grans Absolute 0.01 Thousand/uL      Lymphocytes Absolute 2.01 Thousands/µL      Monocytes Absolute 0.55 Thousand/µL      Eosinophils Absolute 0.07 Thousand/µL      Basophils Absolute 0.05 Thousands/µL     Comprehensive metabolic panel [[id number]] Collected: 01/24/24 1342    Lab Status: Final result Specimen: Blood from Arm, Left Updated: 01/24/24 1429     Sodium 137 mmol/L      Potassium 4.3 mmol/L      Chloride 102 mmol/L      CO2 25 mmol/L      ANION GAP 10 mmol/L      BUN 23 mg/dL      Creatinine 0.66 mg/dL       Glucose 73 mg/dL      Calcium 9.9 mg/dL      AST 24 U/L      ALT 12 U/L      Alkaline Phosphatase 42 U/L      Total Protein 8.1 g/dL      Albumin 4.6 g/dL      Total Bilirubin 0.71 mg/dL      eGFR 92 ml/min/1.73sq m     Narrative:      National Kid[employer] Foundation guidelines for Chronic Kidney Disease (CKD):     Stage 1 with normal or high GFR (GFR > 90 mL/min/1.73 square meters)    Stage 2 Mild CKD (GFR = 60-89 mL/min/1.73 square meters)    Stage 3A Moderate CKD (GFR = 45-59 mL/min/1.73 square meters)    Stage 3B Moderate CKD (GFR = 30-44 mL/min/1.73 square meters)    Stage 4 Severe CKD (GFR = 15-29 mL/min/1.73 square meters)    Stage 5 End Stage CKD (GFR <15 mL/min/1.73 square meters)  Note: GFR calculation is accurate only with a steady state creatinine    HS Troponin I 2hr [[id number]]     Lab Status: No result Specimen: Blood     HS Troponin 0hr (reflex protocol) [[id number]]  (Normal) Collected: 01/24/24 1342    Lab Status: Final result Specimen: Blood from Arm, Left Updated: 01/24/24 1422     hs TnI 0hr 4 ng/L                    XR chest 1 view portable   ED Interpretation by Laura Andersen MD (01/24 0022)   No acute cardiopulmonary process            Procedures  ECG 12 Lead Documentation Only    Date/Time: 1/24/2024 2:28 PM    Performed by: Laura Andersen MD  Authorized by: Laura Andersen MD    Indications / Diagnosis:  Chest pain  ECG reviewed by me, the ED Provider: yes    Patient location:  ED  Previous ECG:     Previous ECG:  Unavailable    Comparison to cardiac monitor: Yes    Interpretation:     Interpretation: normal    Rate:     ECG rate:  64    ECG rate assessment: normal    Rhythm:     Rhythm: sinus rhythm    Ectopy:     Ectopy: PVCs    QRS:     QRS axis:  Normal    QRS intervals:  Normal  Conduction:     Conduction: normal    ST segments:     ST segments:  Normal  T waves:     T waves: normal          ED Course  ED Course as of 01/24/24 1524   Wed Jan 24, 2024   1403 10/2018 NM stress test:  "\"Duration of pharmacologic stress was 3 min and 0 sec. Functional capacity could not be adequately assessed. There was no chest pain during stress. The stress test was terminated due to protocol completion. Pre oxygen  saturation: 100 %. Peak oxygen saturation: 96 %. The stress ECG was negative for ischemia and normal. Arrhythmia during stress: isolated premature ventricular beats.\"   1426 hs TnI 0hr: 4             HEART Risk Score      Flowsheet Row Most Recent Value   Heart Score Risk Calculator    History 2 Filed at: 01/24/2024 1427   ECG 1 Filed at: 01/24/2024 1427   Age 2 Filed at: 01/24/2024 1427   Risk Factors 1 Filed at: 01/24/2024 1427   Troponin 0 Filed at: 01/24/2024 1427   HEART Score 6 Filed at: 01/24/2024 1427                                  Medical Decision Making  Patient remained stable throughout ED course.  Declined pain medications for active chest pain which she reported was mild.  Given concerning story of intermittent chest pain with radiation to the left shoulder, left arm with intermittent diaphoresis, lightheadedness, there is high concern for stable versus unstable angina.  No acute changes on EKG, initial troponin 4.  No evidence of NSTEMI or STEMI at this time.  However due to concerning evolution of chest pain, progressively more frequent admitted to observation for further MI rule out.   given today.  Chest x-ray unremarkable. Heart score 6. Pt understands and agreed with plan. All questions answered. No other concerns.        Amount and/or Complexity of Data Reviewed  Labs: ordered. Decision-making details documented in ED Course.  Radiology: ordered and independent interpretation performed.    Risk  OTC drugs.  Decision regarding hospitalization.          Disposition  Final diagnoses:   Chest pain     Time reflects when diagnosis was documented in both MDM as applicable and the Disposition within this note       Time User Action Codes Description Comment    1/24/2024  2:51 PM " Laura Andersen Add [R07.9] Chest pain           ED Disposition       ED Disposition   Admit    Condition   Stable    Date/Time   Wed Jan 24, 2024 8894    Comment   Case was discussed with Dr. Navarro and the patient's admission status was agreed to be Admission Status: observation status to the service of Dr. Navarro .               Follow-up Information    None         Patient's Medications   Discharge Prescriptions    No medications on file     No discharge procedures on file.    PDMP Review       None             ED Provider  Attending physically available and evaluated Celeste Bryant. I managed the patient along with the ED Attending.    Electronically Signed by           Laura Andersen MD  01/24/24 5248

## 2024-01-24 NOTE — ASSESSMENT & PLAN NOTE
Reports prior surgery at [facility] with poor healing of muscles. States that she could walk on a treadmill, but would have to hold on tightly   Will order pharmacologic stress test to ensure adequate study quality

## 2024-01-25 ENCOUNTER — APPOINTMENT (OUTPATIENT)
Dept: NON INVASIVE DIAGNOSTICS | Facility: HOSPITAL | Age: 64
DRG: 287 | End: 2024-01-25
Payer: MEDICARE

## 2024-01-25 ENCOUNTER — APPOINTMENT (OUTPATIENT)
Dept: NUCLEAR MEDICINE | Facility: HOSPITAL | Age: 64
DRG: 287 | End: 2024-01-25
Payer: MEDICARE

## 2024-01-25 PROBLEM — E78.5 HLD (HYPERLIPIDEMIA): Status: ACTIVE | Noted: 2024-01-25

## 2024-01-25 LAB
CHOLEST SERPL-MCNC: 262 MG/DL
EST. AVERAGE GLUCOSE BLD GHB EST-MCNC: 111 MG/DL
HBA1C MFR BLD: 5.5 %
HDLC SERPL-MCNC: 78 MG/DL
LDLC SERPL CALC-MCNC: 170 MG/DL (ref 0–100)
NONHDLC SERPL-MCNC: 184 MG/DL
NUC STRESS EJECTION FRACTION: 51 %
RATE PRESSURE PRODUCT: 284
SL CV REST NUCLEAR ISOTOPE DOSE: 10 MCI
SL CV STRESS NUCLEAR ISOTOPE DOSE: 33 MCI
SL CV STRESS RECOVERY BP: NORMAL MMHG
SL CV STRESS RECOVERY HR: 112 BPM
SL CV STRESS RECOVERY O2 SAT: 99 %
STRESS ANGINA INDEX: 1
STRESS BASELINE BP: NORMAL MMHG
STRESS BASELINE HR: 86 BPM
STRESS O2 SAT REST: 99 %
STRESS PEAK HR: 142 BPM
STRESS POST O2 SAT PEAK: 99 %
STRESS POST PEAK BP: 2 MMHG
STRESS/REST PERFUSION RATIO: 1.1
TRIGL SERPL-MCNC: 70 MG/DL
TSH SERPL DL<=0.05 MIU/L-ACNC: 1.46 UIU/ML (ref 0.45–4.5)

## 2024-01-25 PROCEDURE — 78452 HT MUSCLE IMAGE SPECT MULT: CPT

## 2024-01-25 PROCEDURE — 78452 HT MUSCLE IMAGE SPECT MULT: CPT | Performed by: INTERNAL MEDICINE

## 2024-01-25 PROCEDURE — 93017 CV STRESS TEST TRACING ONLY: CPT

## 2024-01-25 PROCEDURE — 93018 CV STRESS TEST I&R ONLY: CPT | Performed by: INTERNAL MEDICINE

## 2024-01-25 PROCEDURE — A9502 TC99M TETROFOSMIN: HCPCS

## 2024-01-25 PROCEDURE — G1004 CDSM NDSC: HCPCS

## 2024-01-25 PROCEDURE — 93016 CV STRESS TEST SUPVJ ONLY: CPT | Performed by: INTERNAL MEDICINE

## 2024-01-25 PROCEDURE — 99232 SBSQ HOSP IP/OBS MODERATE 35: CPT | Performed by: PHYSICIAN ASSISTANT

## 2024-01-25 PROCEDURE — 99223 1ST HOSP IP/OBS HIGH 75: CPT | Performed by: INTERNAL MEDICINE

## 2024-01-25 RX ORDER — CLOPIDOGREL BISULFATE 75 MG/1
600 TABLET ORAL ONCE
Status: DISCONTINUED | OUTPATIENT
Start: 2024-01-25 | End: 2024-01-25

## 2024-01-25 RX ORDER — CLOPIDOGREL BISULFATE 75 MG/1
600 TABLET ORAL ONCE
Status: COMPLETED | OUTPATIENT
Start: 2024-01-26 | End: 2024-01-26

## 2024-01-25 RX ORDER — ASPIRIN 81 MG/1
324 TABLET, CHEWABLE ORAL ONCE
Status: DISCONTINUED | OUTPATIENT
Start: 2024-01-25 | End: 2024-01-25

## 2024-01-25 RX ORDER — ATORVASTATIN CALCIUM 40 MG/1
40 TABLET, FILM COATED ORAL
Status: DISCONTINUED | OUTPATIENT
Start: 2024-01-25 | End: 2024-01-26 | Stop reason: SDUPTHER

## 2024-01-25 RX ORDER — REGADENOSON 0.08 MG/ML
0.4 INJECTION, SOLUTION INTRAVENOUS ONCE
Status: COMPLETED | OUTPATIENT
Start: 2024-01-25 | End: 2024-01-25

## 2024-01-25 RX ORDER — ASPIRIN 81 MG/1
324 TABLET, CHEWABLE ORAL ONCE
Status: COMPLETED | OUTPATIENT
Start: 2024-01-26 | End: 2024-01-26

## 2024-01-25 RX ADMIN — ATORVASTATIN CALCIUM 40 MG: 40 TABLET, FILM COATED ORAL at 17:23

## 2024-01-25 RX ADMIN — REGADENOSON 0.4 MG: 0.08 INJECTION, SOLUTION INTRAVENOUS at 12:18

## 2024-01-25 RX ADMIN — ENOXAPARIN SODIUM 40 MG: 40 INJECTION SUBCUTANEOUS at 08:06

## 2024-01-25 NOTE — PLAN OF CARE
Problem: Potential for Falls  Goal: Patient will remain free of falls  Description: INTERVENTIONS:  - Educate patient/family on patient safety including physical limitations  - Instruct patient to call for assistance with activity   - Consult OT/PT to assist with strengthening/mobility   - Keep Call bell within reach  - Keep bed low and locked with side rails adjusted as appropriate  - Keep care items and personal belongings within reach  - Initiate and maintain comfort rounds  - Make Fall Risk Sign visible to staff  - Offer Toileting every  Hours, in advance of need  - Initiate/Maintain alarm  - Obtain necessary fall risk management equipment:   - Apply yellow socks and bracelet for high fall risk patients  - Consider moving patient to room near nurses station  Outcome: Progressing     Problem: PAIN - ADULT  Goal: Verbalizes/displays adequate comfort level or baseline comfort level  Description: Interventions:  - Encourage patient to monitor pain and request assistance  - Assess pain using appropriate pain scale  - Administer analgesics based on type and severity of pain and evaluate response  - Implement non-pharmacological measures as appropriate and evaluate response  - Consider cultural and social influences on pain and pain management  - Notify physician/advanced practitioner if interventions unsuccessful or patient reports new pain  Outcome: Progressing     Problem: INFECTION - ADULT  Goal: Absence or prevention of progression during hospitalization  Description: INTERVENTIONS:  - Assess and monitor for signs and symptoms of infection  - Monitor lab/diagnostic results  - Monitor all insertion sites, i.e. indwelling lines, tubes, and drains  - Monitor endotracheal if appropriate and nasal secretions for changes in amount and color  - [place] appropriate cooling/warming therapies per order  - Administer medications as ordered  - Instruct and encourage patient and family to use good hand hygiene technique  -  Identify and instruct in appropriate isolation precautions for identified infection/condition  Outcome: Progressing  Goal: Absence of fever/infection during neutropenic period  Description: INTERVENTIONS:  - Monitor WBC    Outcome: Progressing     Problem: SAFETY ADULT  Goal: Patient will remain free of falls  Description: INTERVENTIONS:  - Educate patient/family on patient safety including physical limitations  - Instruct patient to call for assistance with activity   - Consult OT/PT to assist with strengthening/mobility   - Keep Call bell within reach  - Keep bed low and locked with side rails adjusted as appropriate  - Keep care items and personal belongings within reach  - Initiate and maintain comfort rounds  - Make Fall Risk Sign visible to staff  - Offer Toileting every  Hours, in advance of need  - Initiate/Maintain alarm  - Obtain necessary fall risk management equipment:   - Apply yellow socks and bracelet for high fall risk patients  - Consider moving patient to room near nurses station  Outcome: Progressing  Goal: Maintain or return to baseline ADL function  Description: INTERVENTIONS:  -  Assess patient's ability to carry out ADLs; assess patient's baseline for ADL function and identify physical deficits which impact ability to perform ADLs (bathing, care of mouth/teeth, toileting, grooming, dressing, etc.)  - Assess/evaluate cause of self-care deficits   - Assess range of motion  - Assess patient's mobility; develop plan if impaired  - Assess patient's need for assistive devices and provide as appropriate  - Encourage maximum independence but intervene and supervise when necessary  - Involve family in performance of ADLs  - Assess for home care needs following discharge   - Consider OT consult to assist with ADL evaluation and planning for discharge  - Provide patient education as appropriate  Outcome: Progressing  Goal: Maintains/Returns to pre admission functional level  Description:  INTERVENTIONS:  - Perform AM-PAC 6 Click Basic Mobility/ Daily Activity assessment daily.  - Set and communicate daily mobility goal to care team and patient/family/caregiver.   - Collaborate with rehabilitation services on mobility goals if consulted  - Perform Range of Motion  times a day.  - Reposition patient every  hours.  - Dangle patient  times a day  - Stand patient  times a day  - Ambulate patient  times a day  - Out of bed to chair  times a day   - Out of bed for meals  times a day  - Out of bed for toileting  - Record patient progress and toleration of activity level   Outcome: Progressing     Problem: DISCHARGE PLANNING  Goal: Discharge to home or other facility with appropriate resources  Description: INTERVENTIONS:  - Identify barriers to discharge w/patient and caregiver  - Arrange for needed discharge resources and transportation as appropriate  - Identify discharge learning needs (meds, wound care, etc.)  - Arrange for interpretive services to assist at discharge as needed  - Refer to Case Management Department for coordinating discharge planning if the patient needs post-hospital services based on physician/advanced practitioner order or complex needs related to functional status, cognitive ability, or social support system  Outcome: Progressing     Problem: Knowledge Deficit  Goal: Patient/family/caregiver demonstrates understanding of disease process, treatment plan, medications, and discharge instructions  Description: Complete learning assessment and assess knowledge base.  Interventions:  - Provide teaching at level of understanding  - Provide teaching via preferred learning methods  Outcome: Progressing     Problem: CARDIOVASCULAR - ADULT  Goal: Maintains optimal cardiac output and hemodynamic stability  Description: INTERVENTIONS:  - Monitor I/O, vital signs and rhythm  - Monitor for S/S and trends of decreased cardiac output  - Administer and titrate ordered vasoactive medications to  optimize hemodynamic stability  - Assess quality of pulses, skin color and temperature  - Assess for signs of decreased coronary artery perfusion  - Instruct patient to report change in severity of symptoms  Outcome: Progressing  Goal: Absence of cardiac dysrhythmias or at baseline rhythm  Description: INTERVENTIONS:  - Continuous cardiac monitoring, vital signs, obtain 12 lead EKG if ordered  - Administer antiarrhythmic and heart rate control medications as ordered  - Monitor electrolytes and administer replacement therapy as ordered  Outcome: Progressing

## 2024-01-25 NOTE — ASSESSMENT & PLAN NOTE
Reports prior surgery at [facility] with poor healing of muscles. States that she could walk on a treadmill, but would have to hold on tightly   Will order pharmacologic stress test to ensure adequate study quality

## 2024-01-25 NOTE — PROGRESS NOTES
[facility]  Progress Note  Name: Celeste Bryant I  MRN: [id number]  Unit/Bed#: S -01 I Date of Admission: 1/24/2024   Date of Service: 1/25/2024 I Hospital Day: 0    Assessment/Plan   * Unstable angina (HCC)  Assessment & Plan  Suspect present on admission, patient with prior intermittent almost random chest pain progressing to more frequent, persistent, and intense chest pain that would occur with exertion and rest.   Prior stress test 5-6 years old was negative    reports prior negative CT Coronary Score   Initial troponin, EKG negative for ischemia  Story concerning for angina   Stress test and EKG equivocal and LDL above goal with borderline HTN   Cardiology input appreciated  Plan for catheterization tomorrow to define coronary anatomy  Monitor symptoms   Telemetry     Tarlov cysts  Assessment & Plan  Reports prior surgery at [facility] with poor healing of muscles. States that she could walk on a treadmill, but would have to hold on tightly   Will order pharmacologic stress test to ensure adequate study quality     HLD (hyperlipidemia)  Assessment & Plan  Continue high dose lipitor   Stop Keto diet              VTE Pharmacologic Prophylaxis: VTE Score: 4 Moderate Risk (Score 3-4) - Pharmacological DVT Prophylaxis Ordered: enoxaparin (Lovenox).    Mobility:   JH-HLM Achieved: 7: Walk 25 feet or more  HLM Goal achieved. Continue to encourage appropriate mobility.    Patient Centered Rounds: I performed bedside rounds with nursing staff today.   Discussions with Specialists or Other Care Team Provider: Discussed with cardiology, Rn, Cm    Education and Discussions with Family / Patient: Updated  () at bedside.    Total Time Spent on Date of Encounter in care of patient: 30 mins. This time was spent on one or more of the following: performing physical exam; counseling and coordination of care; obtaining or reviewing history; documenting in the medical record;  reviewing/ordering tests, medications or procedures; communicating with other healthcare professionals and discussing with patient's family/caregivers.    Current Length of Stay: 0 day(s)  Current Patient Status: Inpatient   Certification Statement: The patient will continue to require additional inpatient hospital stay due to cath tomorrow   Discharge Plan: Anticipate discharge in 24-48 hrs to home.    Code Status: Level 1 - Full Code    Subjective:   Patient reports no complaints today. Seen prior to stress test. Concerned about stress test missing blockages as her sister in law had a similar situation.     Objective:     Vitals:   Temp (24hrs), Av.4 °F (36.9 °C), Min:98.4 °F (36.9 °C), Max:98.4 °F (36.9 °C)    Temp:  [98.4 °F (36.9 °C)] 98.4 °F (36.9 °C)  HR:  [69-81] 74  Resp:  [16-18] 18  BP: (117-144)/(71-80) 136/79  SpO2:  [96 %-100 %] 97 %  Body mass index is 24.6 kg/m².     Input and Output Summary (last 24 hours):   No intake or output data in the 24 hours ending 24 1618    Physical Exam:   Physical Exam  Constitutional:       General: She is not in acute distress.     Appearance: Normal appearance. She is normal weight. She is not ill-appearing or diaphoretic.   HENT:      Head: Normocephalic and atraumatic.      Mouth/Throat:      Mouth: Mucous membranes are moist.   Eyes:      General: No scleral icterus.     Pupils: Pupils are equal, round, and reactive to light.   Cardiovascular:      Rate and Rhythm: Normal rate and regular rhythm.      Pulses: Normal pulses.      Heart sounds: Normal heart sounds, S1 normal and S2 normal. No murmur heard.     No systolic murmur is present.      No diastolic murmur is present.      No gallop. No S3 or S4 sounds.   Pulmonary:      Effort: Pulmonary effort is normal. No accessory muscle usage or respiratory distress.      Breath sounds: Normal breath sounds. No stridor. No decreased breath sounds, wheezing, rhonchi or rales.   Chest:      Chest wall: No  tenderness.   Abdominal:      General: Bowel sounds are normal. There is no distension.      Palpations: Abdomen is soft.      Tenderness: There is no abdominal tenderness. There is no guarding.   Musculoskeletal:      Right lower leg: No edema.      Left lower leg: No edema.   Skin:     General: Skin is warm and dry.      Coloration: Skin is not jaundiced.   Neurological:      General: No focal deficit present.      Mental Status: She is alert. Mental status is at baseline.      Motor: No tremor or seizure activity.   Psychiatric:         Behavior: Behavior is cooperative.          Additional Data:     Labs:  Results from last 7 days   Lab Units 01/24/24  1428   WBC Thousand/uL 7.83   HEMOGLOBIN g/dL 13.6   HEMATOCRIT % 41.9   PLATELETS Thousands/uL 265   NEUTROS PCT % 65   LYMPHS PCT % 26   MONOS PCT % 7   EOS PCT % 1     Results from last 7 days   Lab Units 01/24/24  1342   SODIUM mmol/L 137   POTASSIUM mmol/L 4.3   CHLORIDE mmol/L 102   CO2 mmol/L 25   BUN mg/dL 23   CREATININE mg/dL 0.66   ANION GAP mmol/L 10   CALCIUM mg/dL 9.9   ALBUMIN g/dL 4.6   TOTAL BILIRUBIN mg/dL 0.71   ALK PHOS U/L 42   ALT U/L 12   AST U/L 24   GLUCOSE RANDOM mg/dL 73             Results from last 7 days   Lab Units 01/24/24  1428   HEMOGLOBIN A1C % 5.5           Lines/Drains:  Invasive Devices       Peripheral Intravenous Line  Duration             Peripheral IV 01/24/24 Right;Ventral (anterior) Forearm 1 day                      Telemetry:  Telemetry Orders (From admission, onward)               24 Hour Telemetry Monitoring  Continuous x 24 Hours (Telem)        Question:  Reason for 24 Hour Telemetry  Answer:  PCI/EP study (including pacer and ICD implementation), Cardiac surgery, MI, abnormal cardiac cath, and chest pain- rule out MI                     Telemetry Reviewed: Normal Sinus Rhythm  Indication for Continued Telemetry Use: Acute MI/Unstable Angina/Rule out ACS             Imaging: Reviewed radiology reports from this  admission including: procedure reports    Recent Cultures (last 7 days):         Last 24 Hours Medication List:   Current Facility-Administered Medications   Medication Dose Route Frequency Provider Last Rate    acetaminophen  650 mg Oral Q6H PRN Sam Hatch PA-C      [START ON 1/26/2024] aspirin  324 mg Oral Once JOSÉ MANUEL Kaminski      atorvastatin  40 mg Oral Daily With Dinner Damir Dean MD      [START ON 1/26/2024] clopidogrel  600 mg Oral Once JOSÉ MANUEL Kaminski      enoxaparin  40 mg Subcutaneous Daily Sam Hatch PA-C      ondansetron  4 mg Intravenous Q6H PRN Sam Hatch PA-C          Today, Patient Was Seen By: Sam Hatch PA-C    **Please Note: This note may have been constructed using a voice recognition system.**

## 2024-01-25 NOTE — CONSULTS
Consultation - Cardiology Team One  Celeste Bryant 63 y.o. female MRN: [id number]  Unit/Bed#: S -01 Encounter: [id number]    Inpatient consult to Cardiology  Consult performed by: JOSÉ MANUEL Do  Consult ordered by: Sam Hatch PA-C      Physician Requesting Consult: Jean-Pierre Mendoza DO  Reason for Consult / Principal Problem: unstable angina    Assessment    Progressive angina  For the past two weeks she has experienced both exertional and resting chest pressure. It is not positional, pleuritic, or reproducible  Intensity and frequency has increased over past 2 week  Hs troponin negative x 3  ECG- NSR with nonspecific ST abnormality  Coronary calcium score 0 in 2022  Recently on keto diet  Updated lipid panel and A1c pending    Elevated BP without diagnosis of HTN- 179/91 on admit. Now 138/80. Not on anti-hypertensives as an outpatient    Plan  Follow up nuclear stress test results. If positive, will plan for cardiac cath tomorrow. If negative, then stable for discharge with close outpatient follow up to re-assess symptoms.    History of Present Illness   HPI: Celeste Bryant is a 63 y.o. year old female with Tarlov cysts, GERD, and arthritis who presented to the ED with c/o worsening chest pain over the past few weeks. Symptoms truly began over the summer when she started noticing occasional chest pressure. She had experienced chest discomfort in the past and it was chalked up to reflux. She assumed the same this time and decided to lose some weight. Over the past 5 months she has lost 30 pounds on a Keto diet. She was shoveling snow a couple weeks ago and felt the chest pressure return. Since then she has experienced progressively more intense and more frequent episodes of chest pressure, primarily with exertion but also occasionally at rest- including waking her up at night. She is fairly sedentary 2/2 chronic joint problems but does walk a lot at work. This past week she has noticed she feels  more fatigued with the associated chest discomfort while walking from one end of the building to the other. Her symptoms improve with rest. She is currently symptom free but did have some discomfort walking to the bathroom this morning. High sensitivity troponins are negative. An updated lipid panel and A1c are pending. She had a nonischemic nuclear stress test in 2018 and a CT coronary calcium score of 0 in 2022. Her mother has CHF but no family history of CAD that she is aware of. She smoked for a few years in high school/college but no tobacco use since then. She denies any drug or alcohol use. She works as a  in a school building.     EKG reviewed personally: NSR with nonspecific ST abnormality    Telemetry reviewed personally: NSR with occasional PVCs    Review of Systems   Constitutional: Positive for malaise/fatigue. Negative for chills and weight gain.   Cardiovascular:  Positive for chest pain. Negative for dyspnea on exertion, leg swelling, orthopnea, palpitations and syncope.   Respiratory:  Negative for cough, shortness of breath, sleep disturbances due to breathing and sputum production.    Gastrointestinal:  Negative for bloating and nausea.   Neurological:  Negative for dizziness, light-headedness and weakness.   Psychiatric/Behavioral:  Negative for altered mental status.    All other systems reviewed and are negative.    Historical Information   Past Medical History:   Diagnosis Date    Actinic keratosis     Allergic     Anesthesia complication     after 1 THR had low BP and Heart rate    Arthritis     Basal cell carcinoma 10/13/2022    left neck    Collagen disorder (HCC)     Suspected but not yet diagnosed. Waiting for genetic testing    Eczema     GERD (gastroesophageal reflux disease)     Osteoarthritis     hips, knees    Pneumonia     Squamous cell skin cancer 09/29/2022    right cheek, inferior    Tarlov cysts     TMJ arthritis     more severe on the right    Urinary tract  infection 2019    treated with Macrobid 100 mg    Varicella 1965    Wears hearing aid in both ears      Past Surgical History:   Procedure Laterality Date    COLPOPEXY VAGINAL EXTRAPERITONEAL (VEC) N/A 10/29/2020    Procedure: VEC;  Surgeon: Wolfgang Salamanca MD;  Location: [facility];  Service: UroGynecology           COLPORRHAPHY N/A 10/29/2020    Procedure: A&P COLPORRHAPHY;  Surgeon: Wolfgang Salamanca MD;  Location: [facility];  Service: UroGynecology           CYSTOSCOPY  1983    CYSTOSCOPY N/A 10/29/2020    Procedure: CYSTO;  Surgeon: Wolfgang Salamanca MD;  Location: [facility];  Service: UroGynecology           JOINT REPLACEMENT Bilateral     hip    LAMINECTOMY  2019    Surgery from about L5-S4 for Tarlov cysts    MOHS SURGERY Right 10/19/2022    SCCIS right cheek, inferior-Dr Romo    MOHS SURGERY Left 2022    BCC left neck-Dr Romo    PUBOVAGINAL SLING N/A 10/29/2020    Procedure: PV SLING;  Surgeon: Wolfgang Salamanca MD;  Location: [facility];  Service: UroGynecology           SKIN BIOPSY      TOTAL HIP ARTHROPLASTY Left 02/15/2018    TOTAL HIP ARTHROPLASTY Right 01/15/2019    URETHRAL DILATION       Social History     Substance and Sexual Activity   Alcohol Use No     Social History     Substance and Sexual Activity   Drug Use No     Social History     Tobacco Use   Smoking Status Former    Current packs/day: 0.00    Average packs/day: 1 pack/day for 3.0 years (3.0 ttl pk-yrs)    Types: Cigarettes    Start date:     Quit date:     Years since quittin.0   Smokeless Tobacco Never     Family History:   Family History   Problem Relation Age of Onset    Hypertension Mother     Diabetes Mother     Cancer Mother         breast    Coronary artery disease Mother     Arrhythmia Mother     Clotting disorder Mother         clot in leg    Breast cancer Mother 65    Heart failure Mother         Stage 3 CHF    Hyperlipidemia Mother     Cancer Father         lung    Osteoarthritis Father      "Hyperlipidemia Sister     Diabetes Paternal Grandmother     Diabetes Paternal Grandfather     Crohn's disease Sister     Migraines Son     No Known Problems Daughter     No Known Problems Sister     No Known Problems Daughter     No Known Problems Maternal Aunt     No Known Problems Maternal Aunt     No Known Problems Maternal Aunt     No Known Problems Maternal Aunt     Stroke Neg Hx     Anuerysm Neg Hx        Meds/Allergies   all current active meds have been reviewed and current meds:   Current Facility-Administered Medications   Medication Dose Route Frequency    acetaminophen (TYLENOL) tablet 650 mg  650 mg Oral Q6H PRN    enoxaparin (LOVENOX) subcutaneous injection 40 mg  40 mg Subcutaneous Daily    ondansetron (ZOFRAN) injection 4 mg  4 mg Intravenous Q6H PRN          Allergies   Allergen Reactions    Sulfa Antibiotics Rash       Objective   Vitals: Blood pressure 138/80, pulse 75, temperature 98.4 °F (36.9 °C), resp. rate 18, height 5' 8\" (1.727 m), weight 73.4 kg (161 lb 13.1 oz), SpO2 99%, not currently breastfeeding., Body mass index is 24.6 kg/m².,     Systolic (24hrs), Av , Min:117 , Max:179     Diastolic (24hrs), Av, Min:71, Max:91      No intake or output data in the 24 hours ending 24 0851  Wt Readings from Last 3 Encounters:   24 73.4 kg (161 lb 13.1 oz)   23 75.2 kg (165 lb 12.8 oz)   22 84.9 kg (187 lb 3.2 oz)     Invasive Devices       Peripheral Intravenous Line  Duration             Peripheral IV 24 Right;Ventral (anterior) Forearm <1 day              Drain  Duration             Urethral Catheter Latex 16 Fr. 1181 days                    Physical Exam  Constitutional:       General: She is not in acute distress.  Neck:      Vascular: No hepatojugular reflux or JVD.   Cardiovascular:      Rate and Rhythm: Normal rate and regular rhythm.      Heart sounds: Normal heart sounds. No murmur heard.     No friction rub. No gallop.   Pulmonary:      Effort: " "Pulmonary effort is normal. No respiratory distress.      Breath sounds: No rales.   Abdominal:      General: Bowel sounds are normal. There is no distension.      Palpations: Abdomen is soft.      Tenderness: There is no abdominal tenderness.   Musculoskeletal:         General: No tenderness. Normal range of motion.      Cervical back: Neck supple.      Right lower leg: No edema.      Left lower leg: No edema.   Skin:     General: Skin is warm and dry.      Capillary Refill: Capillary refill takes less than 2 seconds.      Findings: No erythema.   Neurological:      Mental Status: She is alert and oriented to person, place, and time.   Psychiatric:         Mood and Affect: Mood normal.         LABORATORY RESULTS:      CBC with diff:   Results from last 7 days   Lab Units 01/24/24  1428   WBC Thousand/uL 7.83   HEMOGLOBIN g/dL 13.6   HEMATOCRIT % 41.9   MCV fL 97   PLATELETS Thousands/uL 265   RBC Million/uL 4.30   MCH pg 31.6   MCHC g/dL 32.5   RDW % 12.6   MPV fL 9.2   NRBC AUTO /100 WBCs 0       CMP:  Results from last 7 days   Lab Units 01/24/24  1342   POTASSIUM mmol/L 4.3   CHLORIDE mmol/L 102   CO2 mmol/L 25   BUN mg/dL 23   CREATININE mg/dL 0.66   CALCIUM mg/dL 9.9   AST U/L 24   ALT U/L 12   ALK PHOS U/L 42   EGFR ml/min/1.73sq m 92       BMP:  Results from last 7 days   Lab Units 01/24/24  1342   POTASSIUM mmol/L 4.3   CHLORIDE mmol/L 102   CO2 mmol/L 25   BUN mg/dL 23   CREATININE mg/dL 0.66   CALCIUM mg/dL 9.9       Lab Results   Component Value Date    CREATININE 0.66 01/24/2024    CREATININE 0.72 09/18/2020    CREATININE 0.78 04/23/2020       No results found for: \"NTBNP\"                             Lipid Profile:   Lab Results   Component Value Date    CHOL 169 04/13/2015     Lab Results   Component Value Date    HDL 80 04/23/2020    HDL 66 (H) 05/05/2016    HDL 73 04/13/2015     Lab Results   Component Value Date    LDLCALC 94 04/23/2020    LDLCALC 91 05/05/2016    LDLCALC 82 04/13/2015     Lab " Results   Component Value Date    TRIG 87 04/23/2020    TRIG 84 05/05/2016    TRIG 69 04/13/2015     Imaging: I have personally reviewed pertinent reports.   and I have personally reviewed pertinent films in PACS  XR chest 1 view portable    Result Date: 1/24/2024  Narrative: CHEST INDICATION:   cp. COMPARISON: Calcium scoring CT 7/23/2022, renal CT 1/27/2020. EXAM PERFORMED/VIEWS:  XR CHEST PORTABLE. FINDINGS: Cardiomediastinal silhouette normal. Lungs clear. No effusion or pneumothorax. Upper abdomen normal. Bones normal for age.     Impression: No acute cardiopulmonary disease. Workstation performed: [id number]     Counseling / Coordination of Care  Total floor / unit time spent today 45 minutes.  Greater than 50% of total time was spent with the patient and / or family counseling and / or coordination of care.  A description of the counseling / coordination of care: Review of history, current assessment, development of a plan.    Code Status: Level 1 - Full Code    ** Please Note: Dragon 360 Dictation voice to text software may have been used in the creation of this document. **

## 2024-01-25 NOTE — ASSESSMENT & PLAN NOTE
Suspect present on admission, patient with prior intermittent almost random chest pain progressing to more frequent, persistent, and intense chest pain that would occur with exertion and rest.   Prior stress test 5-6 years old was negative    reports prior negative CT Coronary Score   Initial troponin, EKG negative for ischemia  Story concerning for angina   Stress test and EKG equivocal and LDL above goal with borderline HTN   Cardiology input appreciated  Plan for catheterization tomorrow to define coronary anatomy  Monitor symptoms   Telemetry

## 2024-01-26 LAB
CHEST PAIN STATEMENT: NORMAL
MAX DIASTOLIC BP: 86 MMHG
MAX PREDICTED HEART RATE: 155 BPM
PROTOCOL NAME: NORMAL
REASON FOR TERMINATION: NORMAL
STRESS POST EXERCISE DUR MIN: 3 MIN
STRESS POST EXERCISE DUR SEC: 0 SEC
STRESS POST PEAK HR: 142 BPM
STRESS POST PEAK SYSTOLIC BP: 144 MMHG
TARGET HR FORMULA: NORMAL
TEST INDICATION: NORMAL

## 2024-01-26 PROCEDURE — C1894 INTRO/SHEATH, NON-LASER: HCPCS | Performed by: INTERNAL MEDICINE

## 2024-01-26 PROCEDURE — 99153 MOD SED SAME PHYS/QHP EA: CPT | Performed by: INTERNAL MEDICINE

## 2024-01-26 PROCEDURE — 99232 SBSQ HOSP IP/OBS MODERATE 35: CPT | Performed by: PHYSICIAN ASSISTANT

## 2024-01-26 PROCEDURE — 99152 MOD SED SAME PHYS/QHP 5/>YRS: CPT | Performed by: INTERNAL MEDICINE

## 2024-01-26 PROCEDURE — B2111ZZ FLUOROSCOPY OF MULTIPLE CORONARY ARTERIES USING LOW OSMOLAR CONTRAST: ICD-10-PCS | Performed by: INTERNAL MEDICINE

## 2024-01-26 PROCEDURE — 93458 L HRT ARTERY/VENTRICLE ANGIO: CPT | Performed by: INTERNAL MEDICINE

## 2024-01-26 PROCEDURE — 4A023N7 MEASUREMENT OF CARDIAC SAMPLING AND PRESSURE, LEFT HEART, PERCUTANEOUS APPROACH: ICD-10-PCS | Performed by: INTERNAL MEDICINE

## 2024-01-26 PROCEDURE — C1769 GUIDE WIRE: HCPCS | Performed by: INTERNAL MEDICINE

## 2024-01-26 PROCEDURE — 99232 SBSQ HOSP IP/OBS MODERATE 35: CPT | Performed by: INTERNAL MEDICINE

## 2024-01-26 RX ORDER — SODIUM CHLORIDE 9 MG/ML
250 INJECTION, SOLUTION INTRAVENOUS CONTINUOUS
Status: DISPENSED | OUTPATIENT
Start: 2024-01-26 | End: 2024-01-26

## 2024-01-26 RX ORDER — MIDAZOLAM HYDROCHLORIDE 2 MG/2ML
INJECTION, SOLUTION INTRAMUSCULAR; INTRAVENOUS CODE/TRAUMA/SEDATION MEDICATION
Status: DISCONTINUED | OUTPATIENT
Start: 2024-01-26 | End: 2024-01-26 | Stop reason: HOSPADM

## 2024-01-26 RX ORDER — NITROGLYCERIN 20 MG/100ML
INJECTION INTRAVENOUS CODE/TRAUMA/SEDATION MEDICATION
Status: DISCONTINUED | OUTPATIENT
Start: 2024-01-26 | End: 2024-01-26 | Stop reason: HOSPADM

## 2024-01-26 RX ORDER — LIDOCAINE HYDROCHLORIDE 10 MG/ML
INJECTION, SOLUTION EPIDURAL; INFILTRATION; INTRACAUDAL; PERINEURAL CODE/TRAUMA/SEDATION MEDICATION
Status: DISCONTINUED | OUTPATIENT
Start: 2024-01-26 | End: 2024-01-26 | Stop reason: HOSPADM

## 2024-01-26 RX ORDER — VERAPAMIL HYDROCHLORIDE 2.5 MG/ML
INJECTION, SOLUTION INTRAVENOUS CODE/TRAUMA/SEDATION MEDICATION
Status: DISCONTINUED | OUTPATIENT
Start: 2024-01-26 | End: 2024-01-26 | Stop reason: HOSPADM

## 2024-01-26 RX ORDER — HEPARIN SODIUM 1000 [USP'U]/ML
INJECTION, SOLUTION INTRAVENOUS; SUBCUTANEOUS CODE/TRAUMA/SEDATION MEDICATION
Status: DISCONTINUED | OUTPATIENT
Start: 2024-01-26 | End: 2024-01-26 | Stop reason: HOSPADM

## 2024-01-26 RX ORDER — ATORVASTATIN CALCIUM 40 MG/1
40 TABLET, FILM COATED ORAL
Status: DISCONTINUED | OUTPATIENT
Start: 2024-01-26 | End: 2024-01-27 | Stop reason: HOSPADM

## 2024-01-26 RX ORDER — SODIUM CHLORIDE 9 MG/ML
125 INJECTION, SOLUTION INTRAVENOUS CONTINUOUS
Status: DISPENSED | OUTPATIENT
Start: 2024-01-26 | End: 2024-01-26

## 2024-01-26 RX ORDER — FENTANYL CITRATE 50 UG/ML
INJECTION, SOLUTION INTRAMUSCULAR; INTRAVENOUS CODE/TRAUMA/SEDATION MEDICATION
Status: DISCONTINUED | OUTPATIENT
Start: 2024-01-26 | End: 2024-01-26 | Stop reason: HOSPADM

## 2024-01-26 RX ADMIN — ACETAMINOPHEN 650 MG: 325 TABLET, FILM COATED ORAL at 18:37

## 2024-01-26 RX ADMIN — SODIUM CHLORIDE 125 ML/HR: 0.9 INJECTION, SOLUTION INTRAVENOUS at 12:07

## 2024-01-26 RX ADMIN — CLOPIDOGREL BISULFATE 600 MG: 75 TABLET ORAL at 08:50

## 2024-01-26 RX ADMIN — ASPIRIN 81 MG 324 MG: 81 TABLET ORAL at 08:49

## 2024-01-26 NOTE — PROGRESS NOTES
[facility]  Progress Note  Name: Celeste Bryant I  MRN: [id number]  Unit/Bed#: S -01 I Date of Admission: 1/24/2024   Date of Service: 1/26/2024 I Hospital Day: 1    Assessment/Plan   * Unstable angina (HCC)  Assessment & Plan  Suspect present on admission, patient with prior intermittent almost random chest pain progressing to more frequent, persistent, and intense chest pain that would occur with exertion and rest.   Prior stress test 5-6 years old was negative    reports prior negative CT Coronary Score   Initial troponin, EKG negative for ischemia  Story concerning for angina   Stress test and EKG equivocal and LDL above goal with borderline HTN   Cardiology input appreciated  Plan for catheterization to define coronary anatomy  Monitor symptoms   Telemetry     Tarlov cysts  Assessment & Plan  Reports prior surgery at [facility] with poor healing of muscles. States that she could walk on a treadmill, but would have to hold on tightly       HLD (hyperlipidemia)  Assessment & Plan  Continue high dose lipitor   Stop Keto diet              VTE Pharmacologic Prophylaxis: VTE Score: 4 Moderate Risk (Score 3-4) - Pharmacological DVT Prophylaxis Ordered: enoxaparin (Lovenox).    Mobility:   Basic Mobility Inpatient Raw Score: 24  JH-HLM Goal: 8: Walk 250 feet or more  JH-HLM Achieved: 8: Walk 250 feet ot more  HLM Goal achieved. Continue to encourage appropriate mobility.    Patient Centered Rounds: I performed bedside rounds with nursing staff today.   Discussions with Specialists or Other Care Team Provider: Discussed with RN, MATTHIAS    Education and Discussions with Family / Patient: Updated  () at bedside.    Total Time Spent on Date of Encounter in care of patient: 35 mins. This time was spent on one or more of the following: performing physical exam; counseling and coordination of care; obtaining or reviewing history; documenting in the medical record;  reviewing/ordering tests, medications or procedures; communicating with other healthcare professionals and discussing with patient's family/caregivers.    Current Length of Stay: 1 day(s)  Current Patient Status: Inpatient   Certification Statement: The patient will continue to require additional inpatient hospital stay due to cath today  Discharge Plan: Anticipate discharge later today or tomorrow to home.    Code Status: Level 1 - Full Code    Subjective:   Patient still reported symptoms overnight regarding chest pain. Ready for cath.    Objective:     Vitals:   Temp (24hrs), Av.6 °F (37 °C), Min:98.6 °F (37 °C), Max:98.6 °F (37 °C)    Temp:  [98.6 °F (37 °C)] 98.6 °F (37 °C)  HR:  [69-74] 69  Resp:  [18] 18  BP: (113-136)/(68-81) 132/81  SpO2:  [96 %-97 %] 96 %  Body mass index is 24.6 kg/m².     Input and Output Summary (last 24 hours):   No intake or output data in the 24 hours ending 24 1206    Physical Exam:   Physical Exam  Constitutional:       General: She is not in acute distress.     Appearance: Normal appearance. She is normal weight. She is not ill-appearing or diaphoretic.   HENT:      Head: Normocephalic and atraumatic.      Mouth/Throat:      Mouth: Mucous membranes are moist.   Eyes:      General: No scleral icterus.     Pupils: Pupils are equal, round, and reactive to light.   Cardiovascular:      Rate and Rhythm: Normal rate and regular rhythm.      Pulses: Normal pulses.      Heart sounds: Normal heart sounds, S1 normal and S2 normal. No murmur heard.     No systolic murmur is present.      No diastolic murmur is present.      No gallop. No S3 or S4 sounds.   Pulmonary:      Effort: Pulmonary effort is normal. No accessory muscle usage or respiratory distress.      Breath sounds: Normal breath sounds. No stridor. No decreased breath sounds, wheezing, rhonchi or rales.   Chest:      Chest wall: No tenderness.   Abdominal:      General: Bowel sounds are normal. There is no distension.       Palpations: Abdomen is soft.      Tenderness: There is no abdominal tenderness. There is no guarding.   Musculoskeletal:      Right lower leg: No edema.      Left lower leg: No edema.   Skin:     General: Skin is warm and dry.      Coloration: Skin is not jaundiced.   Neurological:      General: No focal deficit present.      Mental Status: She is alert. Mental status is at baseline.      Motor: No tremor or seizure activity.   Psychiatric:         Behavior: Behavior is cooperative.          Additional Data:     Labs:  Results from last 7 days   Lab Units 01/24/24  1428   WBC Thousand/uL 7.83   HEMOGLOBIN g/dL 13.6   HEMATOCRIT % 41.9   PLATELETS Thousands/uL 265   NEUTROS PCT % 65   LYMPHS PCT % 26   MONOS PCT % 7   EOS PCT % 1     Results from last 7 days   Lab Units 01/24/24  1342   SODIUM mmol/L 137   POTASSIUM mmol/L 4.3   CHLORIDE mmol/L 102   CO2 mmol/L 25   BUN mg/dL 23   CREATININE mg/dL 0.66   ANION GAP mmol/L 10   CALCIUM mg/dL 9.9   ALBUMIN g/dL 4.6   TOTAL BILIRUBIN mg/dL 0.71   ALK PHOS U/L 42   ALT U/L 12   AST U/L 24   GLUCOSE RANDOM mg/dL 73             Results from last 7 days   Lab Units 01/24/24  1428   HEMOGLOBIN A1C % 5.5           Lines/Drains:  Invasive Devices       Peripheral Intravenous Line  Duration             Peripheral IV 01/24/24 Right;Ventral (anterior) Forearm 1 day                      Telemetry:  Telemetry Orders (From admission, onward)               24 Hour Telemetry Monitoring  Continuous x 24 Hours (Telem)        Question:  Reason for 24 Hour Telemetry  Answer:  PCI/EP study (including pacer and ICD implementation), Cardiac surgery, MI, abnormal cardiac cath, and chest pain- rule out MI                     Telemetry Reviewed: Normal Sinus Rhythm  Indication for Continued Telemetry Use: Acute MI/Unstable Angina/Rule out ACS             Imaging: No pertinent imaging reviewed.    Recent Cultures (last 7 days):         Last 24 Hours Medication List:   Current  Facility-Administered Medications   Medication Dose Route Frequency Provider Last Rate    acetaminophen  650 mg Oral Q6H PRN Sam Hatch PA-C      atorvastatin  40 mg Oral Daily With Dinner JOSÉ MANUEL Ji      enoxaparin  40 mg Subcutaneous Daily Sam Hatch PA-C      ondansetron  4 mg Intravenous Q6H PRN Sam Hatch PA-C      sodium chloride  125 mL/hr Intravenous Continuous JOSÉ MANUEL Ji          Today, Patient Was Seen By: Sam Hatch PA-C    **Please Note: This note may have been constructed using a voice recognition system.**

## 2024-01-26 NOTE — ASSESSMENT & PLAN NOTE
Suspect present on admission, patient with prior intermittent almost random chest pain progressing to more frequent, persistent, and intense chest pain that would occur with exertion and rest.   Prior stress test 5-6 years old was negative    reports prior negative CT Coronary Score   Initial troponin, EKG negative for ischemia  Story concerning for angina   Stress test and EKG equivocal and LDL above goal with borderline HTN   Cardiology input appreciated  Plan for catheterization to define coronary anatomy  Monitor symptoms   Telemetry

## 2024-01-26 NOTE — ASSESSMENT & PLAN NOTE
Reports prior surgery at [facility] with poor healing of muscles. States that she could walk on a treadmill, but would have to hold on tightly

## 2024-01-26 NOTE — PROGRESS NOTES
General Cardiology   Progress Note -  [employer]   Celeste Bryant 63 y.o. female MRN: [id number]    Unit/Bed#: S -01 Encounter: [id number]    Assessment  1. Chest pain, concerning for typical angina.  -2-week history of resting and exertional chest pressure with occasional associated shortness of breath and diaphoresis.  -HS troponin levels unremarkable.  -ECG on admission demonstrated NSR with nonspecific ST-T wave abnormalities.  -Pharmacologic stress test 1/25/2024; no perfusion defects.  -CT calcium score 7/2020; 0  2. Elevated BP (POA)  -No prior diagnosis of hypertension.  -/91 on admission.  -Average /76, last recorded 132/81, HR 69.  -Not previously or currently on antihypertensive agents.  3. Dyslipidemia  -Cholesterol 262, triglycerides 70, HDL 78, and LDL calculated 170.  -Not previously on statin therapy.    Plan  -Pt states overnight/early this morning she has had occasional episodes of left-sided chest pressure occurring at rest and with with activity in her room, associates some mild shortness of breath and diaphoresis with these episodes.  -Proceed with [facility] procedure today for definitive ischemic evaluation.  Maintain n.p.o. status.  Start IV fluids for hydration.  -Loaded with full dose aspirin and clopidogrel 600 mg this a.m.  Results of cath will determine need for mono/dual antiplatelet therapy.  -Start Crestor 10 mg daily on DC, can utilize atorvastatin 40 mg while hospitalized since Crestor is nonformulary.  -Hold off on BB initiation for now.  -Continue to monitor on telemetry.    Subjective  Review of Systems   Constitutional: Negative for chills, fever and malaise/fatigue.   Eyes:  Negative for visual disturbance.   Cardiovascular:  Negative for chest pain, dyspnea on exertion, leg swelling, orthopnea and palpitations.   Respiratory:  Negative for cough and shortness of breath.    Gastrointestinal:  Negative for abdominal pain.   Neurological:  Negative for dizziness, headaches  "and light-headedness.       Objective:   Physical Exam  Vitals and nursing note reviewed.   Constitutional:       General: She is not in acute distress.     Appearance: She is not diaphoretic.   HENT:      Head: Normocephalic and atraumatic.   Eyes:      General: No scleral icterus.  Cardiovascular:      Rate and Rhythm: Normal rate and regular rhythm.      Pulses: Normal pulses.      Heart sounds: Normal heart sounds.   Pulmonary:      Effort: Pulmonary effort is normal.      Breath sounds: Normal breath sounds. No wheezing or rales.   Abdominal:      Palpations: Abdomen is soft.   Musculoskeletal:      Right lower leg: No edema.      Left lower leg: No edema.   Skin:     General: Skin is warm and dry.      Capillary Refill: Capillary refill takes less than 2 seconds.   Neurological:      Mental Status: She is alert and oriented to person, place, and time.   Psychiatric:         Mood and Affect: Mood normal.         Vitals: Blood pressure 132/81, pulse 69, temperature 98.6 °F (37 °C), resp. rate 18, height 5' 8\" (1.727 m), weight 73.4 kg (161 lb 13.1 oz), SpO2 96%, not currently breastfeeding.,     Body mass index is 24.6 kg/m².,   Systolic (24hrs), Av , Min:113 , Max:136     Diastolic (24hrs), Av, Min:68, Max:81    No intake or output data in the 24 hours ending 24 1107  Weight (last 2 days)       Date/Time Weight    24 1919 73.4 (161.82)            LABORATORY RESULTS      CBC with diff:   Results from last 7 days   Lab Units 24  1428   WBC Thousand/uL 7.83   HEMOGLOBIN g/dL 13.6   HEMATOCRIT % 41.9   MCV fL 97   PLATELETS Thousands/uL 265   RBC Million/uL 4.30   MCH pg 31.6   MCHC g/dL 32.5   RDW % 12.6   MPV fL 9.2   NRBC AUTO /100 WBCs 0       CMP:  Results from last 7 days   Lab Units 24  1342   POTASSIUM mmol/L 4.3   CHLORIDE mmol/L 102   CO2 mmol/L 25   BUN mg/dL 23   CREATININE mg/dL 0.66   CALCIUM mg/dL 9.9   AST U/L 24   ALT U/L 12   ALK PHOS U/L 42   EGFR ml/min/1.73sq m " "92       BMP:  Results from last 7 days   Lab Units 01/24/24  1342   POTASSIUM mmol/L 4.3   CHLORIDE mmol/L 102   CO2 mmol/L 25   BUN mg/dL 23   CREATININE mg/dL 0.66   CALCIUM mg/dL 9.9       No results found for: \"NTBNP\"           Results from last 7 days   Lab Units 01/24/24  1428   HEMOGLOBIN A1C % 5.5       Results from last 7 days   Lab Units 01/24/24  1342   TSH 3RD GENERATON uIU/mL 1.457             Lipid Profile:   Lab Results   Component Value Date    CHOL 169 04/13/2015     Lab Results   Component Value Date    HDL 78 01/24/2024    HDL 80 04/23/2020    HDL 66 (H) 05/05/2016     Lab Results   Component Value Date    LDLCALC 170 (H) 01/24/2024    LDLCALC 94 04/23/2020    LDLCALC 91 05/05/2016     Lab Results   Component Value Date    TRIG 70 01/24/2024    TRIG 87 04/23/2020    TRIG 84 05/05/2016       Cardiac testing:   No results found for this or any previous visit.    No results found for this or any previous visit.    No results found for this or any previous visit.    No valid procedures specified.  No results found for this or any previous visit.      Meds/Allergies   all current active meds have been reviewed and current meds:   Current Facility-Administered Medications   Medication Dose Route Frequency    acetaminophen (TYLENOL) tablet 650 mg  650 mg Oral Q6H PRN    atorvastatin (LIPITOR) tablet 40 mg  40 mg Oral Daily With Dinner    enoxaparin (LOVENOX) subcutaneous injection 40 mg  40 mg Subcutaneous Daily    ondansetron (ZOFRAN) injection 4 mg  4 mg Intravenous Q6H PRN            Counseling / Coordination of Care  Total floor / unit time spent today 20 minutes.  Greater than 50% of total time was spent with the patient and / or family counseling and / or coordination of care.      ** Please Note: Dragon 360 Dictation voice to text software may have been used in the creation of this document. **  "

## 2024-01-26 NOTE — PLAN OF CARE
Problem: PAIN - ADULT  Goal: Verbalizes/displays adequate comfort level or baseline comfort level  Description: Interventions:  - Encourage patient to monitor pain and request assistance  - Assess pain using appropriate pain scale  - Administer analgesics based on type and severity of pain and evaluate response  - Implement non-pharmacological measures as appropriate and evaluate response  - Consider cultural and social influences on pain and pain management  - Notify physician/advanced practitioner if interventions unsuccessful or patient reports new pain  Outcome: Progressing     Problem: INFECTION - ADULT  Goal: Absence or prevention of progression during hospitalization  Description: INTERVENTIONS:  - Assess and monitor for signs and symptoms of infection  - Monitor lab/diagnostic results  - Monitor all insertion sites, i.e. indwelling lines, tubes, and drains  - Monitor endotracheal if appropriate and nasal secretions for changes in amount and color  - [place] appropriate cooling/warming therapies per order  - Administer medications as ordered  - Instruct and encourage patient and family to use good hand hygiene technique  - Identify and instruct in appropriate isolation precautions for identified infection/condition  Outcome: Progressing  Goal: Absence of fever/infection during neutropenic period  Description: INTERVENTIONS:  - Monitor WBC    Outcome: Progressing     Problem: SAFETY ADULT  Goal: Patient will remain free of falls  Description: INTERVENTIONS:  - Educate patient/family on patient safety including physical limitations  - Instruct patient to call for assistance with activity   - Consult OT/PT to assist with strengthening/mobility   - Keep Call bell within reach  - Keep bed low and locked with side rails adjusted as appropriate  - Keep care items and personal belongings within reach  - Initiate and maintain comfort rounds  - Make Fall Risk Sign visible to staff  - Offer Toileting every 2 Hours,  in advance of need  - Apply yellow socks and bracelet for high fall risk patients  - Consider moving patient to room near nurses station  Outcome: Progressing  Goal: Maintain or return to baseline ADL function  Description: INTERVENTIONS:  -  Assess patient's ability to carry out ADLs; assess patient's baseline for ADL function and identify physical deficits which impact ability to perform ADLs (bathing, care of mouth/teeth, toileting, grooming, dressing, etc.)  - Assess/evaluate cause of self-care deficits   - Assess range of motion  - Assess patient's mobility; develop plan if impaired  - Assess patient's need for assistive devices and provide as appropriate  - Encourage maximum independence but intervene and supervise when necessary  - Involve family in performance of ADLs  - Assess for home care needs following discharge   - Consider OT consult to assist with ADL evaluation and planning for discharge  - Provide patient education as appropriate  Outcome: Progressing  Goal: Maintains/Returns to pre admission functional level  Description: INTERVENTIONS:  - Perform AM-PAC 6 Click Basic Mobility/ Daily Activity assessment daily.  - Set and communicate daily mobility goal to care team and patient/family/caregiver.   - Collaborate with rehabilitation services on mobility goals if consulted  - Perform Range of Motion 3 times a day.  - Reposition patient every 2 hours.  - Dangle patient 3 times a day  - Stand patient 3 times a day  - Ambulate patient 3 times a day  - Out of bed to chair 3 times a day   - Out of bed for meals 3 times a day  - Out of bed for toileting  - Record patient progress and toleration of activity level   Outcome: Progressing     Problem: CARDIOVASCULAR - ADULT  Goal: Maintains optimal cardiac output and hemodynamic stability  Description: INTERVENTIONS:  - Monitor I/O, vital signs and rhythm  - Monitor for S/S and trends of decreased cardiac output  - Administer and titrate ordered vasoactive  medications to optimize hemodynamic stability  - Assess quality of pulses, skin color and temperature  - Assess for signs of decreased coronary artery perfusion  - Instruct patient to report change in severity of symptoms  Outcome: Progressing

## 2024-01-26 NOTE — PLAN OF CARE
Problem: Potential for Falls  Goal: Patient will remain free of falls  Description: INTERVENTIONS:  - Educate patient/family on patient safety including physical limitations  - Instruct patient to call for assistance with activity   - Consult OT/PT to assist with strengthening/mobility   - Keep Call bell within reach  - Keep bed low and locked with side rails adjusted as appropriate  - Keep care items and personal belongings within reach  - Initiate and maintain comfort rounds  - Make Fall Risk Sign visible to staff  - Offer Toileting every  Hours, in advance of need  - Initiate/Maintain alarm  - Obtain necessary fall risk management equipment:   - Apply yellow socks and bracelet for high fall risk patients  - Consider moving patient to room near nurses station  Outcome: Progressing     Problem: PAIN - ADULT  Goal: Verbalizes/displays adequate comfort level or baseline comfort level  Description: Interventions:  - Encourage patient to monitor pain and request assistance  - Assess pain using appropriate pain scale  - Administer analgesics based on type and severity of pain and evaluate response  - Implement non-pharmacological measures as appropriate and evaluate response  - Consider cultural and social influences on pain and pain management  - Notify physician/advanced practitioner if interventions unsuccessful or patient reports new pain  Outcome: Progressing     Problem: INFECTION - ADULT  Goal: Absence or prevention of progression during hospitalization  Description: INTERVENTIONS:  - Assess and monitor for signs and symptoms of infection  - Monitor lab/diagnostic results  - Monitor all insertion sites, i.e. indwelling lines, tubes, and drains  - Monitor endotracheal if appropriate and nasal secretions for changes in amount and color  - [place] appropriate cooling/warming therapies per order  - Administer medications as ordered  - Instruct and encourage patient and family to use good hand hygiene technique  -  Identify and instruct in appropriate isolation precautions for identified infection/condition  Outcome: Progressing  Goal: Absence of fever/infection during neutropenic period  Description: INTERVENTIONS:  - Monitor WBC    Outcome: Progressing     Problem: SAFETY ADULT  Goal: Patient will remain free of falls  Description: INTERVENTIONS:  - Educate patient/family on patient safety including physical limitations  - Instruct patient to call for assistance with activity   - Consult OT/PT to assist with strengthening/mobility   - Keep Call bell within reach  - Keep bed low and locked with side rails adjusted as appropriate  - Keep care items and personal belongings within reach  - Initiate and maintain comfort rounds  - Make Fall Risk Sign visible to staff  - Offer Toileting every  Hours, in advance of need  - Initiate/Maintain alarm  - Obtain necessary fall risk management equipment:   - Apply yellow socks and bracelet for high fall risk patients  - Consider moving patient to room near nurses station  Outcome: Progressing  Goal: Maintain or return to baseline ADL function  Description: INTERVENTIONS:  -  Assess patient's ability to carry out ADLs; assess patient's baseline for ADL function and identify physical deficits which impact ability to perform ADLs (bathing, care of mouth/teeth, toileting, grooming, dressing, etc.)  - Assess/evaluate cause of self-care deficits   - Assess range of motion  - Assess patient's mobility; develop plan if impaired  - Assess patient's need for assistive devices and provide as appropriate  - Encourage maximum independence but intervene and supervise when necessary  - Involve family in performance of ADLs  - Assess for home care needs following discharge   - Consider OT consult to assist with ADL evaluation and planning for discharge  - Provide patient education as appropriate  Outcome: Progressing  Goal: Maintains/Returns to pre admission functional level  Description:  INTERVENTIONS:  - Perform AM-PAC 6 Click Basic Mobility/ Daily Activity assessment daily.  - Set and communicate daily mobility goal to care team and patient/family/caregiver.   - Collaborate with rehabilitation services on mobility goals if consulted  - Perform Range of Motion  times a day.  - Reposition patient every  hours.  - Dangle patient  times a day  - Stand patient  times a day  - Ambulate patient  times a day  - Out of bed to chair  times a day   - Out of bed for meals  times a day  - Out of bed for toileting  - Record patient progress and toleration of activity level   Outcome: Progressing     Problem: DISCHARGE PLANNING  Goal: Discharge to home or other facility with appropriate resources  Description: INTERVENTIONS:  - Identify barriers to discharge w/patient and caregiver  - Arrange for needed discharge resources and transportation as appropriate  - Identify discharge learning needs (meds, wound care, etc.)  - Arrange for interpretive services to assist at discharge as needed  - Refer to Case Management Department for coordinating discharge planning if the patient needs post-hospital services based on physician/advanced practitioner order or complex needs related to functional status, cognitive ability, or social support system  Outcome: Progressing     Problem: Knowledge Deficit  Goal: Patient/family/caregiver demonstrates understanding of disease process, treatment plan, medications, and discharge instructions  Description: Complete learning assessment and assess knowledge base.  Interventions:  - Provide teaching at level of understanding  - Provide teaching via preferred learning methods  Outcome: Progressing     Problem: CARDIOVASCULAR - ADULT  Goal: Maintains optimal cardiac output and hemodynamic stability  Description: INTERVENTIONS:  - Monitor I/O, vital signs and rhythm  - Monitor for S/S and trends of decreased cardiac output  - Administer and titrate ordered vasoactive medications to  optimize hemodynamic stability  - Assess quality of pulses, skin color and temperature  - Assess for signs of decreased coronary artery perfusion  - Instruct patient to report change in severity of symptoms  Outcome: Progressing  Goal: Absence of cardiac dysrhythmias or at baseline rhythm  Description: INTERVENTIONS:  - Continuous cardiac monitoring, vital signs, obtain 12 lead EKG if ordered  - Administer antiarrhythmic and heart rate control medications as ordered  - Monitor electrolytes and administer replacement therapy as ordered  Outcome: Progressing

## 2024-01-27 VITALS
OXYGEN SATURATION: 98 % | SYSTOLIC BLOOD PRESSURE: 118 MMHG | TEMPERATURE: 98.8 F | RESPIRATION RATE: 19 BRPM | HEIGHT: 68 IN | WEIGHT: 161.82 LBS | BODY MASS INDEX: 24.52 KG/M2 | HEART RATE: 77 BPM | DIASTOLIC BLOOD PRESSURE: 80 MMHG

## 2024-01-27 LAB
ANION GAP SERPL CALCULATED.3IONS-SCNC: 6 MMOL/L
ATRIAL RATE: 64 BPM
ATRIAL RATE: 73 BPM
BUN SERPL-MCNC: 20 MG/DL (ref 5–25)
CALCIUM SERPL-MCNC: 8.6 MG/DL (ref 8.4–10.2)
CHLORIDE SERPL-SCNC: 108 MMOL/L (ref 96–108)
CO2 SERPL-SCNC: 25 MMOL/L (ref 21–32)
CREAT SERPL-MCNC: 0.57 MG/DL (ref 0.6–1.3)
GFR SERPL CREATININE-BSD FRML MDRD: 97 ML/MIN/1.73SQ M
GLUCOSE SERPL-MCNC: 96 MG/DL (ref 65–140)
P AXIS: 58 DEGREES
P AXIS: 59 DEGREES
POTASSIUM SERPL-SCNC: 3.8 MMOL/L (ref 3.5–5.3)
PR INTERVAL: 126 MS
PR INTERVAL: 128 MS
QRS AXIS: 47 DEGREES
QRS AXIS: 55 DEGREES
QRSD INTERVAL: 100 MS
QRSD INTERVAL: 90 MS
QT INTERVAL: 428 MS
QT INTERVAL: 434 MS
QTC INTERVAL: 447 MS
QTC INTERVAL: 471 MS
SODIUM SERPL-SCNC: 139 MMOL/L (ref 135–147)
T WAVE AXIS: 21 DEGREES
T WAVE AXIS: 7 DEGREES
VENTRICULAR RATE: 64 BPM
VENTRICULAR RATE: 73 BPM

## 2024-01-27 PROCEDURE — 99232 SBSQ HOSP IP/OBS MODERATE 35: CPT | Performed by: INTERNAL MEDICINE

## 2024-01-27 PROCEDURE — 80048 BASIC METABOLIC PNL TOTAL CA: CPT | Performed by: PHYSICIAN ASSISTANT

## 2024-01-27 PROCEDURE — 99239 HOSP IP/OBS DSCHRG MGMT >30: CPT | Performed by: PHYSICIAN ASSISTANT

## 2024-01-27 RX ORDER — ROSUVASTATIN CALCIUM 10 MG/1
10 TABLET, COATED ORAL DAILY
Qty: 30 TABLET | Refills: 0 | Status: SHIPPED | OUTPATIENT
Start: 2024-01-27

## 2024-01-27 RX ORDER — DIAPER,BRIEF,INFANT-TODD,DISP
EACH MISCELLANEOUS 4 TIMES DAILY PRN
Status: DISCONTINUED | OUTPATIENT
Start: 2024-01-27 | End: 2024-01-27 | Stop reason: HOSPADM

## 2024-01-27 RX ORDER — ATORVASTATIN CALCIUM 40 MG/1
40 TABLET, FILM COATED ORAL
Qty: 30 TABLET | Refills: 0 | Status: SHIPPED | OUTPATIENT
Start: 2024-01-27 | End: 2024-01-27

## 2024-01-27 RX ADMIN — ENOXAPARIN SODIUM 40 MG: 40 INJECTION SUBCUTANEOUS at 08:00

## 2024-01-27 RX ADMIN — HYDROCORTISONE: 1 OINTMENT TOPICAL at 13:33

## 2024-01-27 RX ADMIN — ACETAMINOPHEN 650 MG: 325 TABLET, FILM COATED ORAL at 07:57

## 2024-01-27 RX ADMIN — ACETAMINOPHEN 650 MG: 325 TABLET, FILM COATED ORAL at 01:38

## 2024-01-27 NOTE — PROGRESS NOTES
"Cardiology   Celeste Bryant 63 y.o. female MRN: [id number]  S -01 Encounter: [id number]        Assessment and plan:     >> Atypical chest pain: Overall given her atypical symptoms and prior negative coronary calcium score it is unlikely that this is cardiac chest pain/ACS  >> Dyslipidemia  >> Coronary artery calcium score of 0 in 2022     Plan:     -Negative cath and stress. Stable for DC  -Start Crestor 10 mg for primary prevention- continue OP  - OP f/u with cardiology for risk factor modification      1/27/2024: No complaints. No events on telemetry.         The patient denies chest pain, shortness of breath, palpitations, orthopnea, PND, pedal edema, syncope, presyncope, diaphoresis, nausea/vomiting           Physical exam  Objective   Vitals: Blood pressure 118/80, pulse 77, temperature 98.8 °F (37.1 °C), resp. rate 19, height 5' 8\" (1.727 m), weight 73.4 kg (161 lb 13.1 oz), SpO2 98%, not currently breastfeeding.  Orthostatic Blood Pressures      Flowsheet Row Most Recent Value   Blood Pressure 118/80 filed at 01/27/2024 0659   Patient Position - Orthostatic VS Lying filed at 01/27/2024 0700          General:  AO x3, no acute distress  Cardiac:  S1-S2 normal,  No murmurs. No rubs or gallops, JVP: normal  Lungs:  Clear to auscultation bilaterally, no wheezing or crackles.  Abdomen:  Soft, nontender, nondistended.  Extremities:  Warm, well perfused, pulses palpable, no ulcers or rashes. Edema:absent  Neuro: Grossly nonfocal        ======================================================  TREADMILL STRESS  No results found for this or any previous visit.     ----------------------------------------------------------------------------------------------  NUCLEAR STRESS TEST: No results found for this or any previous visit.    Results for orders placed during the hospital encounter of 01/24/24    NM Myocardial Perfusion Spect (Pharmacological Induced Stress and/or Rest)    Interpretation Summary    Stress ECG: No " ST deviation is noted. The ECG was equivocal for ischemia. The stress ECG is equivocal for ischemia after pharmacologic vasodilation.    Perfusion: There are no perfusion defects.    Stress Function: Left ventricular function post-stress is normal. Stress ejection fraction is 51%.    Stress Combined Conclusion: Left ventricular perfusion is normal.      --------------------------------------------------------------------------------  CATH:  1/27/24: Normal epicardial Coronary Arteries  Nl LV function/LVEDP      --------------------------------------------------------------------------------  ECHO:   No results found for this or any previous visit.    No results found for this or any previous visit.    --------------------------------------------------------------------------------  HOLTER  No results found for this or any previous visit.    --------------------------------------------------------------------------------  CAROTIDS  No results found for this or any previous visit.       [unfilled]   ======================================================          Review of Systems  ROS as noted above, otherwise 12 point review of systems was performed and is negative.     Historical Information   Past Medical History:   Diagnosis Date    Actinic keratosis     Allergic     Anesthesia complication     after 1 THR had low BP and Heart rate    Arthritis     Basal cell carcinoma 10/13/2022    left neck    Collagen disorder (HCC)     Suspected but not yet diagnosed. Waiting for genetic testing    Eczema     GERD (gastroesophageal reflux disease)     Osteoarthritis     hips, knees    Pneumonia     Squamous cell skin cancer 09/29/2022    right cheek, inferior    Tarlov cysts     TMJ arthritis     more severe on the right    Urinary tract infection 09/06/2019    treated with Macrobid 100 mg    Varicella 1965    Wears hearing aid in both ears      Past Surgical History:   Procedure Laterality Date    COLPOPEXY VAGINAL  EXTRAPERITONEAL (VEC) N/A 10/29/2020    Procedure: VEC;  Surgeon: Wolfagng Salamanca MD;  Location: [facility];  Service: UroGynecology           COLPORRHAPHY N/A 10/29/2020    Procedure: A&P COLPORRHAPHY;  Surgeon: Wolfgang Salamanca MD;  Location[facility];  Service: UroGynecology           CYSTOSCOPY  1983    CYSTOSCOPY N/A 10/29/2020    Procedure: CYSTO;  Surgeon: Wolfgang Salamanca MD;  Location: [facility];  Service: UroGynecology           JOINT REPLACEMENT Bilateral     hip    LAMINECTOMY  2019    Surgery from about L5-S4 for Tarlov cysts    MOHS SURGERY Right 10/19/2022    SCCIS right cheek, inferior-Dr Romo    MOHS SURGERY Left 2022    BCC left neck-Dr Romo    PUBOVAGINAL SLING N/A 10/29/2020    Procedure: PV SLING;  Surgeon: Wolfgang Salamanca MD;  Location: [facility];  Service: UroGynecology           SKIN BIOPSY      TOTAL HIP ARTHROPLASTY Left 02/15/2018    TOTAL HIP ARTHROPLASTY Right 01/15/2019    URETHRAL DILATION       Social History     Substance and Sexual Activity   Alcohol Use No     Social History     Substance and Sexual Activity   Drug Use No     Social History     Tobacco Use   Smoking Status Former    Current packs/day: 0.00    Average packs/day: 1 pack/day for 3.0 years (3.0 ttl pk-yrs)    Types: Cigarettes    Start date:     Quit date:     Years since quittin.1   Smokeless Tobacco Never     Family History   Problem Relation Age of Onset    Hypertension Mother     Diabetes Mother     Cancer Mother         breast    Coronary artery disease Mother     Arrhythmia Mother     Clotting disorder Mother         clot in leg    Breast cancer Mother 65    Heart failure Mother         Stage 3 CHF    Hyperlipidemia Mother     Cancer Father         lung    Osteoarthritis Father     Hyperlipidemia Sister     Diabetes Paternal Grandmother     Diabetes Paternal Grandfather     Crohn's disease Sister     Migraines Son     No Known Problems Daughter     No Known Problems Sister     No Known  "Problems Daughter     No Known Problems Maternal Aunt     No Known Problems Maternal Aunt     No Known Problems Maternal Aunt     No Known Problems Maternal Aunt     Stroke Neg Hx     Anuerysm Neg Hx        Meds/Allergies   Hospital Medications:   Current Facility-Administered Medications   Medication Dose Route Frequency    acetaminophen (TYLENOL) tablet 650 mg  650 mg Oral Q6H PRN    atorvastatin (LIPITOR) tablet 40 mg  40 mg Oral Daily With Dinner    enoxaparin (LOVENOX) subcutaneous injection 40 mg  40 mg Subcutaneous Daily    ondansetron (ZOFRAN) injection 4 mg  4 mg Intravenous Q6H PRN     Home Medications:   Medications Prior to Admission   Medication    acetaminophen (TYLENOL) 500 mg tablet    ascorbic acid (VITAMIN C) 500 mg tablet    b complex vitamins tablet    Cholecalciferol (Vitamin D3) LIQD    cyclobenzaprine (FLEXERIL) 5 mg tablet    diphenhydrAMINE (BENADRYL) 25 mg capsule    docusate sodium (COLACE) 100 mg capsule    loratadine (CLARITIN) 10 mg tablet    naltrexone (REVIA) 50 mg tablet    phenylephrine (SUDAFED PE) 10 MG TABS    Sennosides 17.2 MG TABS       Allergies   Allergen Reactions    Sulfa Antibiotics Rash         Portions of the record may have been created with voice recognition software.  Occasional wrong words or \"sound a like\" substitutions may have occurred due to the inherent limitations of voice recognition software.  Read the chart carefully and recognize, using context, where substitutions have occurred.                    "

## 2024-01-27 NOTE — ASSESSMENT & PLAN NOTE
Reports prior surgery at [facility] with poor healing of muscles. States that she could walk on a treadmill, but would have to hold on tightly

## 2024-01-27 NOTE — DISCHARGE SUMMARY
[facility]  Discharge- Celeste Bryant 1960, 63 y.o. female MRN: [id number]  Unit/Bed#: S -01 Encounter: [id number]  Primary Care Provider: Erick Cha DC   Date and time admitted to hospital: 1/24/2024  1:29 PM    * Unstable angina (HCC)  Assessment & Plan  Suspect present on admission, patient with prior intermittent almost random chest pain progressing to more frequent, persistent, and intense chest pain that would occur with exertion and rest.   Prior stress test 5-6 years old was negative    reports prior negative CT Coronary Score   Initial troponin, EKG negative for ischemia  Story concerning for angina   Stress test and EKG equivocal and LDL above goal with borderline HTN   Cath negative   Stable for discharge  Cardiology input appreciated, outpatient follow up    Tarlov cysts  Assessment & Plan  Reports prior surgery at [facility] with poor healing of muscles. States that she could walk on a treadmill, but would have to hold on tightly       HLD (hyperlipidemia)  Assessment & Plan  Continue high dose lipitor   Stop Keto diet       Medical Problems       Resolved Problems  Date Reviewed: 1/27/2024   None       Discharging Physician / Practitioner: Sam Hatch PA-C  PCP: Erick Cha DC  Admission Date:   Admission Orders (From admission, onward)       Ordered        01/25/24 1618  Inpatient Admission  Once            01/24/24 1452  Place in Observation  Once                          Discharge Date: 01/27/24    Consultations During Hospital Stay:  Cardiology - Dr. Dean     Procedures Performed:   CXR  NM stress test  Cardiac Catheterization    Significant Findings / Test Results:   CXR: No acute pulmonary disease  NM Stress Test: LV perfusion normal. EKG equivocal for ischemia. No perfusion deficits   Cardiac Catheterization: Normal epicardial arteries.     Incidental Findings:   None     Test Results Pending at Discharge (will require follow up):   None    "  Outpatient Tests Requested:  None    Complications:  None    Reason for Admission: Chest Pain/Unstable Angina    Hospital Course:   Celeste Bryant is a 63 y.o. female patient who originally presented to the hospital on 1/24/2024 due to chest pain. Her initial work up was reassuring with EKG, CXR, and troponin, however her story was concerning for anginal chest pain. She was admitted and monitored on telemetry and cardiology was consulted. She underwent further evaluation with stress testing and risk stratification. Stress test was equivocal and she did have elevated LDL. Cath was performed which was negative. She was deemed stable for discharge. She will start Crestor as an outpatient and will be referred to GI for further work up.         Please see above list of diagnoses and related plan for additional information.     Condition at Discharge: stable    Discharge Day Visit / Exam:   Subjective:  Patient reports mild symptoms overnight. She is happy that her cath was negative.   Vitals: Blood Pressure: 118/80 (01/27/24 0659)  Pulse: 77 (01/27/24 0659)  Temperature: 98.8 °F (37.1 °C) (01/27/24 0659)  Temp Source: Oral (01/27/24 0700)  Respirations: 19 (01/27/24 0700)  Height: 5' 8\" (172.7 cm) (01/24/24 1919)  Weight - Scale: 73.4 kg (161 lb 13.1 oz) (01/24/24 1919)  SpO2: 98 % (01/27/24 0659)  Exam:   Physical Exam  Constitutional:       General: She is not in acute distress.     Appearance: Normal appearance. She is normal weight. She is not ill-appearing or diaphoretic.   HENT:      Head: Normocephalic and atraumatic.      Mouth/Throat:      Mouth: Mucous membranes are moist.   Eyes:      General: No scleral icterus.     Pupils: Pupils are equal, round, and reactive to light.   Cardiovascular:      Rate and Rhythm: Normal rate and regular rhythm.      Pulses: Normal pulses.      Heart sounds: Normal heart sounds, S1 normal and S2 normal. No murmur heard.     No systolic murmur is present.      No diastolic murmur " is present.      No gallop. No S3 or S4 sounds.   Pulmonary:      Effort: Pulmonary effort is normal. No accessory muscle usage or respiratory distress.      Breath sounds: Normal breath sounds. No stridor. No decreased breath sounds, wheezing, rhonchi or rales.   Chest:      Chest wall: No tenderness.   Abdominal:      General: Bowel sounds are normal. There is no distension.      Palpations: Abdomen is soft.      Tenderness: There is no abdominal tenderness. There is no guarding.   Musculoskeletal:      Right lower leg: No edema.      Left lower leg: No edema.   Skin:     General: Skin is warm and dry.      Coloration: Skin is not jaundiced.   Neurological:      General: No focal deficit present.      Mental Status: She is alert. Mental status is at baseline.      Motor: No tremor or seizure activity.   Psychiatric:         Behavior: Behavior is cooperative.          Discussion with Family: Patient declined call to .     Discharge instructions/Information to patient and family:   See after visit summary for information provided to patient and family.      Provisions for Follow-Up Care:  See after visit summary for information related to follow-up care and any pertinent home health orders.      Mobility at time of Discharge:   Basic Mobility Inpatient Raw Score: 24  JH-HLM Goal: 8: Walk 250 feet or more  JH-HLM Achieved: 8: Walk 250 feet ot more  HLM Goal achieved. Continue to encourage appropriate mobility.     Disposition:   Home    Planned Readmission: None     Discharge Statement:  I spent 45 minutes discharging the patient. This time was spent on the day of discharge. I had direct contact with the patient on the day of discharge. Greater than 50% of the total time was spent examining patient, answering all patient questions, arranging and discussing plan of care with patient as well as directly providing post-discharge instructions.  Additional time then spent on discharge  activities.    Discharge Medications:  See after visit summary for reconciled discharge medications provided to patient and/or family.      **Please Note: This note may have been constructed using a voice recognition system**

## 2024-01-27 NOTE — PLAN OF CARE
Problem: PAIN - ADULT  Goal: Verbalizes/displays adequate comfort level or baseline comfort level  Description: Interventions:  - Encourage patient to monitor pain and request assistance  - Assess pain using appropriate pain scale  - Administer analgesics based on type and severity of pain and evaluate response  - Implement non-pharmacological measures as appropriate and evaluate response  - Consider cultural and social influences on pain and pain management  - Notify physician/advanced practitioner if interventions unsuccessful or patient reports new pain  Outcome: Progressing     Problem: INFECTION - ADULT  Goal: Absence or prevention of progression during hospitalization  Description: INTERVENTIONS:  - Assess and monitor for signs and symptoms of infection  - Monitor lab/diagnostic results  - Monitor all insertion sites, i.e. indwelling lines, tubes, and drains  - Monitor endotracheal if appropriate and nasal secretions for changes in amount and color  - [place] appropriate cooling/warming therapies per order  - Administer medications as ordered  - Instruct and encourage patient and family to use good hand hygiene technique  - Identify and instruct in appropriate isolation precautions for identified infection/condition  Outcome: Progressing  Goal: Absence of fever/infection during neutropenic period  Description: INTERVENTIONS:  - Monitor WBC    Outcome: Progressing     Problem: SAFETY ADULT  Goal: Patient will remain free of falls  Description: INTERVENTIONS:  - Educate patient/family on patient safety including physical limitations  - Instruct patient to call for assistance with activity   - Consult OT/PT to assist with strengthening/mobility   - Keep Call bell within reach  - Keep bed low and locked with side rails adjusted as appropriate  - Keep care items and personal belongings within reach  - Initiate and maintain comfort rounds  - Make Fall Risk Sign visible to staff  - Offer Toileting every 2 Hours,  in advance of need  - Apply yellow socks and bracelet for high fall risk patients  - Consider moving patient to room near nurses station  Outcome: Progressing  Goal: Maintain or return to baseline ADL function  Description: INTERVENTIONS:  -  Assess patient's ability to carry out ADLs; assess patient's baseline for ADL function and identify physical deficits which impact ability to perform ADLs (bathing, care of mouth/teeth, toileting, grooming, dressing, etc.)  - Assess/evaluate cause of self-care deficits   - Assess range of motion  - Assess patient's mobility; develop plan if impaired  - Assess patient's need for assistive devices and provide as appropriate  - Encourage maximum independence but intervene and supervise when necessary  - Involve family in performance of ADLs  - Assess for home care needs following discharge   - Consider OT consult to assist with ADL evaluation and planning for discharge  - Provide patient education as appropriate  Outcome: Progressing  Goal: Maintains/Returns to pre admission functional level  Description: INTERVENTIONS:  - Perform AM-PAC 6 Click Basic Mobility/ Daily Activity assessment daily.  - Set and communicate daily mobility goal to care team and patient/family/caregiver.   - Collaborate with rehabilitation services on mobility goals if consulted  - Perform Range of Motion 3 times a day.  - Reposition patient every 2 hours.  - Dangle patient 3 times a day  - Stand patient 3 times a day  - Ambulate patient 3 times a day  - Out of bed to chair 3 times a day   - Out of bed for meals 3 times a day  - Out of bed for toileting  - Record patient progress and toleration of activity level   Outcome: Progressing     Problem: CARDIOVASCULAR - ADULT  Goal: Maintains optimal cardiac output and hemodynamic stability  Description: INTERVENTIONS:  - Monitor I/O, vital signs and rhythm  - Monitor for S/S and trends of decreased cardiac output  - Administer and titrate ordered vasoactive  medications to optimize hemodynamic stability  - Assess quality of pulses, skin color and temperature  - Assess for signs of decreased coronary artery perfusion  - Instruct patient to report change in severity of symptoms  Outcome: Progressing  Goal: Absence of cardiac dysrhythmias or at baseline rhythm  Description: INTERVENTIONS:  - Continuous cardiac monitoring, vital signs, obtain 12 lead EKG if ordered  - Administer antiarrhythmic and heart rate control medications as ordered  - Monitor electrolytes and administer replacement therapy as ordered  Outcome: Progressing

## 2024-01-27 NOTE — NURSING NOTE
Patient was s/p cardiac cath 1/26. Hemoband removed at 2100 and bandaid placed on site. No drainage, pain or signs of hematoma. Pulses present and equal bilaterally.     Heart Attack Zone Tool and Heart Attack Booklet Educational Materials provided to patient.

## 2024-01-27 NOTE — ASSESSMENT & PLAN NOTE
Suspect present on admission, patient with prior intermittent almost random chest pain progressing to more frequent, persistent, and intense chest pain that would occur with exertion and rest.   Prior stress test 5-6 years old was negative    reports prior negative CT Coronary Score   Initial troponin, EKG negative for ischemia  Story concerning for angina   Stress test and EKG equivocal and LDL above goal with borderline HTN   Cath negative   Stable for discharge  Cardiology input appreciated, outpatient follow up

## 2024-02-08 ENCOUNTER — OFFICE VISIT (OUTPATIENT)
Dept: GASTROENTEROLOGY | Facility: AMBULARY SURGERY CENTER | Age: 64
End: 2024-02-08
Payer: MEDICARE

## 2024-02-08 VITALS
DIASTOLIC BLOOD PRESSURE: 82 MMHG | SYSTOLIC BLOOD PRESSURE: 122 MMHG | WEIGHT: 157.2 LBS | BODY MASS INDEX: 23.82 KG/M2 | OXYGEN SATURATION: 99 % | HEIGHT: 68 IN | HEART RATE: 85 BPM

## 2024-02-08 DIAGNOSIS — R07.89 NON-CARDIAC CHEST PAIN: Primary | ICD-10-CM

## 2024-02-08 DIAGNOSIS — R10.13 EPIGASTRIC PAIN: ICD-10-CM

## 2024-02-08 PROCEDURE — 99203 OFFICE O/P NEW LOW 30 MIN: CPT | Performed by: PHYSICIAN ASSISTANT

## 2024-02-08 NOTE — LETTER
February 8, 2024     Erick Cha DC  [address] [place] [address]    Patient: Celeste Bryant   YOB: 1960   Date of Visit: 2/8/2024       Dear Dr. Cha:    Thank you for referring Celeste Bryant to me for evaluation. Below are my notes for this consultation.    If you have questions, please do not hesitate to call me. I look forward to following your patient along with you.         Sincerely,        Ryan García PA-C        CC: KYUNG Terry PA-C  2/8/2024 11:39 AM  Incomplete  [facility] - New Patient Office Visit  Celeste Bryant 63 y.o. female MRN: [id number]  Encounter: [id number]          ASSESSMENT AND PLAN:      1. Non-cardiac chest pain  -In the emergency room on January 24, 2024 for chest pain  -Troponins negative and EKG normal.  -Cardiac catheterization negative.  -Pain felt to be noncardiac in origin  -Referred to GI    2. Epigastric pain  -taking acid suppression did not help  -Plan EGD  - This risks of this procedure include but are not limited to; anesthesia complications, injury/perforation of the bowel, infection and bleeding.    3. Screen colon  - patient defers colonoscopy  - recommend yearly FIT and cologuard every 3 years      ______________________________________________________________________    Chief Complaint: Noncardiac chest pain    HPI: This is a 63-year-old white female new to our office with past medical history of osteoarthritis, Tarlov cysts, vaginal prolapse, rectocele who presents for follow-up from the emergency room for chest pain.  Patient had extensive cardiac workup including negative troponins and EKG.  Also negative cardiac catheterization.  Pain felt to be noncardiac in origin.  She was referred to GI for further evaluation.    How long have you had the abdominal pain - several months  Where is the pain located - chest, epigastric, LUQ  How do you describe the pain -  pressure  How often do you have the pain - daily  How long does the pain last - hours  Does anything make the pain worse - nothing  Does anything make the pain better - nothing  Any significant use of NSAIDS - No  Any change in bowels - No  Any black or bloody stools - No  Have you been seen by another DrAbrahan for this pain - ER recently  Any recent imaging - CXR, troponins, EKG and cath all negative  Any previous EGD - none    No pacemaker or defibrillator implanted.   No chronic kidney disease or solitary kidney.  Not on any supplemental oxygen at night.  Not on any antiplatelet or anticoagulants.      Endoscopy History:  EGD - none  Colonoscopy - none    REVIEW OF SYSTEMS:    CONSTITUTIONAL: Denies any fever, chills, rigors, and weight loss.  HEENT: Denies hearing loss or visual disturbances.  CARDIOVASCULAR: No chest pain or palpitations.   RESPIRATORY: Denies any cough, hemoptysis, shortness of breath or dyspnea on exertion.  GASTROINTESTINAL: As noted in the History of Present Illness.   GENITOURINARY: No problems with urination. Denies any hematuria or dysuria.  NEUROLOGIC: No dizziness or vertigo, denies headaches.   MUSCULOSKELETAL: Denies any muscle or joint pain.   SKIN: Denies skin rashes or itching.   ENDOCRINE: Denies excessive thirst. Denies intolerance to heat or cold.  PSYCHOSOCIAL: Denies depression or anxiety. Denies any recent memory loss.       Historical Information  Past Medical History:   Diagnosis Date   • Actinic keratosis    • Allergic    • Anesthesia complication     after 1 THR had low BP and Heart rate   • Arthritis    • Basal cell carcinoma 10/13/2022    left neck   • Collagen disorder (HCC)     Suspected but not yet diagnosed. Waiting for genetic testing   • Eczema    • GERD (gastroesophageal reflux disease)    • Osteoarthritis     hips, knees   • Pneumonia    • Squamous cell skin cancer 09/29/2022    right cheek, inferior   • Tarlov cysts    • TMJ arthritis     more severe on the right    • Urinary tract infection 2019    treated with Macrobid 100 mg   • Varicella 1965   • Wears hearing aid in both ears      Past Surgical History:   Procedure Laterality Date   • CARDIAC CATHETERIZATION Left 2024    Procedure: Cardiac Left Heart Cath;  Surgeon: Justo Tanenr MD;  [facility];  Service: Cardiology   • COLPOPEXY VAGINAL EXTRAPERITONEAL (VEC) N/A 10/29/2020    Procedure: VEC;  Surgeon: Wolfgang Salamanca MD;  [facility];  Service: UroGynecology          • COLPORRHAPHY N/A 10/29/2020    Procedure: A&P COLPORRHAPHY;  Surgeon: Wolfgang Salamanca MD;  [facility];  Service: UroGynecology          • CYSTOSCOPY     • CYSTOSCOPY N/A 10/29/2020    Procedure: CYSTO;  Surgeon: Wolfgang Salamanca MD;  [facility];  Service: UroGynecology          • JOINT REPLACEMENT Bilateral     hip   • LAMINECTOMY  2019    Surgery from about L5-S4 for Tarlov cysts   • MOHS SURGERY Right 10/19/2022    SCCIS right cheek, inferior-Dr Romo   • MOHS SURGERY Left 2022    BCC left neck-Dr Romo   • PUBOVAGINAL SLING N/A 10/29/2020    Procedure: PV SLING;  Surgeon: Wolfgang Salamanca MD;  [facility];  Service: UroGynecology          • SKIN BIOPSY     • TOTAL HIP ARTHROPLASTY Left 02/15/2018   • TOTAL HIP ARTHROPLASTY Right 01/15/2019   • URETHRAL DILATION       Social History  Social History     Substance and Sexual Activity   Alcohol Use No     Social History     Substance and Sexual Activity   Drug Use No     Social History     Tobacco Use   Smoking Status Former   • Current packs/day: 0.00   • Average packs/day: 1 pack/day for 3.0 years (3.0 ttl pk-yrs)   • Types: Cigarettes   • Start date:    • Quit date:    • Years since quittin.1   Smokeless Tobacco Never     Family History   Problem Relation Age of Onset   • Hypertension Mother    • Diabetes Mother    • Cancer Mother         breast   • Coronary artery disease Mother    • Arrhythmia Mother    •  "Clotting disorder Mother         clot in leg   • Breast cancer Mother 65   • Heart failure Mother         Stage 3 CHF   • Hyperlipidemia Mother    • Cancer Father         lung   • Osteoarthritis Father    • Hyperlipidemia Sister    • Diabetes Paternal Grandmother    • Diabetes Paternal Grandfather    • Crohn's disease Sister    • Migraines Son    • No Known Problems Daughter    • No Known Problems Sister    • No Known Problems Daughter    • No Known Problems Maternal Aunt    • No Known Problems Maternal Aunt    • No Known Problems Maternal Aunt    • No Known Problems Maternal Aunt    • Stroke Neg Hx    • Anuerysm Neg Hx        Meds/Allergies      Current Outpatient Medications:   •  acetaminophen (TYLENOL) 500 mg tablet  •  rosuvastatin (CRESTOR) 10 MG tablet    Allergies   Allergen Reactions   • Sulfa Antibiotics Rash           Objective    Blood pressure 122/82, pulse 85, height 5' 8\" (1.727 m), weight 71.3 kg (157 lb 3.2 oz), SpO2 99%, not currently breastfeeding. Body mass index is 23.9 kg/m².        PHYSICAL EXAM:      General Appearance:   Alert, cooperative, no distress, appears stated age   HEENT:   Normocephalic, atraumatic, anicteric.     Neck:  Supple, symmetrical   Lungs:   Clear to auscultation bilaterally; no rales, rhonchi or wheezing; respirations unlabored    Heart::   Regular rate and rhythm; no murmur, rub, or gallop.   Abdomen:   Soft, non-tender, non-distended; normal bowel sounds; no masses, no organomegaly    Genitalia:   Deferred    Rectal:   Deferred    Extremities:  No cyanosis, clubbing or edema    Pulses:  Not assessed   Skin:  No jaundice, rashes, or lesions    Lymph nodes:  Not assessed       Lab Results:   No visits with results within 1 Day(s) from this visit.   Latest known visit with results is:   Admission on 01/24/2024, Discharged on 01/27/2024   Component Date Value   • WBC 01/24/2024 7.83    • RBC 01/24/2024 4.30    • Hemoglobin 01/24/2024 13.6    • Hematocrit 01/24/2024 41.9  "   • MCV 01/24/2024 97    • MCH 01/24/2024 31.6    • MCHC 01/24/2024 32.5    • RDW 01/24/2024 12.6    • MPV 01/24/2024 9.2    • Platelets 01/24/2024 265    • nRBC 01/24/2024 0    • Neutrophils Relative 01/24/2024 65    • Immat GRANS % 01/24/2024 0    • Lymphocytes Relative 01/24/2024 26    • Monocytes Relative 01/24/2024 7    • Eosinophils Relative 01/24/2024 1    • Basophils Relative 01/24/2024 1    • Neutrophils Absolute 01/24/2024 5.14    • Immature Grans Absolute 01/24/2024 0.01    • Lymphocytes Absolute 01/24/2024 2.01    • Monocytes Absolute 01/24/2024 0.55    • Eosinophils Absolute 01/24/2024 0.07    • Basophils Absolute 01/24/2024 0.05    • Sodium 01/24/2024 137    • Potassium 01/24/2024 4.3    • Chloride 01/24/2024 102    • CO2 01/24/2024 25    • ANION GAP 01/24/2024 10    • BUN 01/24/2024 23    • Creatinine 01/24/2024 0.66    • Glucose 01/24/2024 73    • Calcium 01/24/2024 9.9    • AST 01/24/2024 24    • ALT 01/24/2024 12    • Alkaline Phosphatase 01/24/2024 42    • Total Protein 01/24/2024 8.1    • Albumin 01/24/2024 4.6    • Total Bilirubin 01/24/2024 0.71    • eGFR 01/24/2024 92    • hs TnI 0hr 01/24/2024 4    • hs TnI 2hr 01/24/2024 4    • Delta 2hr hsTnI 01/24/2024 0    • Ventricular Rate 01/24/2024 64    • Atrial Rate 01/24/2024 64    • MA Interval 01/24/2024 126    • QRSD Interval 01/24/2024 90    • QT Interval 01/24/2024 434    • QTC Interval 01/24/2024 447    • P Axis 01/24/2024 58    • QRS Axis 01/24/2024 55    • T Wave Axis 01/24/2024 21    • hs TnI 4hr 01/24/2024 5    • Delta 4hr hsTnI 01/24/2024 1    • Baseline HR 01/25/2024 86    • Baseline BP 01/25/2024 144/86    • O2 sat rest 01/25/2024 99    • Stress peak HR 01/25/2024 142    • Post peak BP 01/25/2024 2    • O2 sat peak 01/25/2024 99    • Recovery HR 01/25/2024 112    • Recovery BP 01/25/2024 142/84    • O2 sat recovery 01/25/2024 99    • Rate Pressure Product 01/25/2024 284.0    • Angina Index 01/25/2024 1    • Rest Nuclear Isotope Dose  01/25/2024 10.00    • Stress Nuclear Isotope D* 01/25/2024 33.00    • Stress/rest perfusion ra* 01/25/2024 1.10    • EF (%) 01/25/2024 51    • Ventricular Rate 01/24/2024 73    • Atrial Rate 01/24/2024 73    • HI Interval 01/24/2024 128    • QRSD Interval 01/24/2024 100    • QT Interval 01/24/2024 428    • QTC Interval 01/24/2024 471    • P Axis 01/24/2024 59    • QRS Axis 01/24/2024 47    • T Wave Axis 01/24/2024 7    • Cholesterol 01/24/2024 262 (H)    • Triglycerides 01/24/2024 70    • HDL, Direct 01/24/2024 78    • LDL Calculated 01/24/2024 170 (H)    • Non-HDL-Chol (CHOL-HDL) 01/24/2024 184    • TSH 3RD GENERATON 01/24/2024 1.457    • Hemoglobin A1C 01/24/2024 5.5    • EAG 01/24/2024 111    • Protocol Name 01/25/2024 [facility] WALK    • Exercise duration (min) 01/25/2024 3    • Exercise duration (sec) 01/25/2024 0    • Post Peak Systolic BP 01/25/2024 144    • Max Diastolic Bp 01/25/2024 86    • Peak HR 01/25/2024 142    • Max Predicted Heart Rate 01/25/2024 155    • Reason for Termination 01/25/2024 Protocol Complete    • Test Indication 01/25/2024 CHEST PAIN    • Target Hr Formular 01/25/2024 (220 - Age)*100%    • Chest Pain Statement 01/25/2024 non-limiting    • Sodium 01/27/2024 139    • Potassium 01/27/2024 3.8    • Chloride 01/27/2024 108    • CO2 01/27/2024 25    • ANION GAP 01/27/2024 6    • BUN 01/27/2024 20    • Creatinine 01/27/2024 0.57 (L)    • Glucose 01/27/2024 96    • Calcium 01/27/2024 8.6    • eGFR 01/27/2024 97          Radiology Results:   Cardiac catheterization    Result Date: 1/26/2024  Narrative: •  The left ventricular systolic function is normal. Normal epicardial Coronary Arteries Nl LV function/LVEDP     Stress strip    Result Date: 1/26/2024  Narrative: Confirmed by Archive, Archive ([id number]),  Karli Butterfield ([id number]) on 1/26/2024 10:27:01 AM    NM Myocardial Perfusion Spect (Pharmacological Induced Stress and/or Rest)    Result Date: 1/25/2024  Narrative: •  Stress ECG: No ST  deviation is noted. The ECG was equivocal for ischemia. The stress ECG is equivocal for ischemia after pharmacologic vasodilation. •  Perfusion: There are no perfusion defects. •  Stress Function: Left ventricular function post-stress is normal. Stress ejection fraction is 51%. •  Stress Combined Conclusion: Left ventricular perfusion is normal.     XR chest 1 view portable    Result Date: 1/24/2024  Narrative: CHEST INDICATION:   cp. COMPARISON: Calcium scoring CT 7/23/2022, renal CT 1/27/2020. EXAM PERFORMED/VIEWS:  XR CHEST PORTABLE. FINDINGS: Cardiomediastinal silhouette normal. Lungs clear. No effusion or pneumothorax. Upper abdomen normal. Bones normal for age.     Impression: No acute cardiopulmonary disease. Workstation performed: [id number]       Ryan García PA-C  2/8/2024  9:57 AM  Sign when Signing Visit  [facility] - New Patient Office Visit  Celeste Bryant 63 y.o. female MRN: [id number]  Encounter: [id number]          ASSESSMENT AND PLAN:      1. Non-cardiac chest pain  -In the emergency room on January 24, 2024 for chest pain  -Troponins negative and EKG normal.  -Cardiac catheterization negative.  -Pain felt to be noncardiac in origin  -Referred to GI  2. Epigastric pain  -Start famotidine 20 mg once daily  -Khanh EGD  - This risks of this procedure include but are not limited to; anesthesia complications, injury/perforation of the bowel, infection and bleeding.      ______________________________________________________________________    Chief Complaint: Noncardiac chest pain    HPI: This is a 63-year-old white female new to our office with past medical history of osteoarthritis, Tarlov cysts, vaginal prolapse, rectocele who presents for follow-up from the emergency room for chest pain.  Patient had extensive cardiac workup including negative troponins and EKG.  Also negative cardiac catheterization.  Pain felt to be noncardiac in  origin.  She was referred to GI for further evaluation.    Endoscopy History:  EGD -   Colonoscopy -     REVIEW OF SYSTEMS:    CONSTITUTIONAL: Denies any fever, chills, rigors, and weight loss.  HEENT: Denies hearing loss or visual disturbances.  CARDIOVASCULAR: No chest pain or palpitations.   RESPIRATORY: Denies any cough, hemoptysis, shortness of breath or dyspnea on exertion.  GASTROINTESTINAL: As noted in the History of Present Illness.   GENITOURINARY: No problems with urination. Denies any hematuria or dysuria.  NEUROLOGIC: No dizziness or vertigo, denies headaches.   MUSCULOSKELETAL: Denies any muscle or joint pain.   SKIN: Denies skin rashes or itching.   ENDOCRINE: Denies excessive thirst. Denies intolerance to heat or cold.  PSYCHOSOCIAL: Denies depression or anxiety. Denies any recent memory loss.       Historical Information  Past Medical History:   Diagnosis Date   • Actinic keratosis    • Allergic    • Anesthesia complication     after 1 THR had low BP and Heart rate   • Arthritis    • Basal cell carcinoma 10/13/2022    left neck   • Collagen disorder (HCC)     Suspected but not yet diagnosed. Waiting for genetic testing   • Eczema    • GERD (gastroesophageal reflux disease)    • Osteoarthritis     hips, knees   • Pneumonia    • Squamous cell skin cancer 09/29/2022    right cheek, inferior   • Tarlov cysts    • TMJ arthritis     more severe on the right   • Urinary tract infection 09/06/2019    treated with Macrobid 100 mg   • Varicella 1965   • Wears hearing aid in both ears      Past Surgical History:   Procedure Laterality Date   • CARDIAC CATHETERIZATION Left 1/26/2024    Procedure: Cardiac Left Heart Cath;  Surgeon: Justo Tanner MD;  [facility];  Service: Cardiology   • COLPOPEXY VAGINAL EXTRAPERITONEAL (VEC) N/A 10/29/2020    Procedure: VEC;  Surgeon: Wolfgang Salamanca MD;  [facility];  Service: UroGynecology          • COLPORRHAPHY N/A 10/29/2020    Procedure: A&P  COLPORRHAPHY;  Surgeon: Wolfgang Salamanca MD;  [facility];  Service: UroGynecology          • CYSTOSCOPY     • CYSTOSCOPY N/A 10/29/2020    Procedure: CYSTO;  Surgeon: Wolfgang Salamanca MD;  [facility];  Service: UroGynecology          • JOINT REPLACEMENT Bilateral     hip   • LAMINECTOMY  2019    Surgery from about L5-S4 for Tarlov cysts   • MOHS SURGERY Right 10/19/2022    SCCIS right cheek, inferior-Dr Romo   • MOHS SURGERY Left 2022    BCC left neck-Dr Romo   • PUBOVAGINAL SLING N/A 10/29/2020    Procedure: PV SLING;  Surgeon: Wolfgang Salamanca MD;  [facility];  Service: UroGynecology          • SKIN BIOPSY     • TOTAL HIP ARTHROPLASTY Left 02/15/2018   • TOTAL HIP ARTHROPLASTY Right 01/15/2019   • URETHRAL DILATION       Social History  Social History     Substance and Sexual Activity   Alcohol Use No     Social History     Substance and Sexual Activity   Drug Use No     Social History     Tobacco Use   Smoking Status Former   • Current packs/day: 0.00   • Average packs/day: 1 pack/day for 3.0 years (3.0 ttl pk-yrs)   • Types: Cigarettes   • Start date:    • Quit date:    • Years since quittin.1   Smokeless Tobacco Never     Family History   Problem Relation Age of Onset   • Hypertension Mother    • Diabetes Mother    • Cancer Mother         breast   • Coronary artery disease Mother    • Arrhythmia Mother    • Clotting disorder Mother         clot in leg   • Breast cancer Mother 65   • Heart failure Mother         Stage 3 CHF   • Hyperlipidemia Mother    • Cancer Father         lung   • Osteoarthritis Father    • Hyperlipidemia Sister    • Diabetes Paternal Grandmother    • Diabetes Paternal Grandfather    • Crohn's disease Sister    • Migraines Son    • No Known Problems Daughter    • No Known Problems Sister    • No Known Problems Daughter    • No Known Problems Maternal Aunt    • No Known Problems Maternal Aunt    • No Known Problems Maternal Aunt    • No  Known Problems Maternal Aunt    • Stroke Neg Hx    • Anuerysm Neg Hx        Meds/Allergies      Current Outpatient Medications:   •  acetaminophen (TYLENOL) 500 mg tablet  •  rosuvastatin (CRESTOR) 10 MG tablet    Allergies   Allergen Reactions   • Sulfa Antibiotics Rash           Objective    not currently breastfeeding. There is no height or weight on file to calculate BMI.        PHYSICAL EXAM:      General Appearance:   Alert, cooperative, no distress, appears stated age   HEENT:   Normocephalic, atraumatic, anicteric.     Neck:  Supple, symmetrical   Lungs:   Clear to auscultation bilaterally; no rales, rhonchi or wheezing; respirations unlabored    Heart::   Regular rate and rhythm; no murmur, rub, or gallop.   Abdomen:   Soft, non-tender, non-distended; normal bowel sounds; no masses, no organomegaly    Genitalia:   Deferred    Rectal:   Deferred    Extremities:  No cyanosis, clubbing or edema    Pulses:  Not assessed   Skin:  No jaundice, rashes, or lesions    Lymph nodes:  Not assessed       Lab Results:   No visits with results within 1 Day(s) from this visit.   Latest known visit with results is:   Admission on 01/24/2024, Discharged on 01/27/2024   Component Date Value   • WBC 01/24/2024 7.83    • RBC 01/24/2024 4.30    • Hemoglobin 01/24/2024 13.6    • Hematocrit 01/24/2024 41.9    • MCV 01/24/2024 97    • MCH 01/24/2024 31.6    • MCHC 01/24/2024 32.5    • RDW 01/24/2024 12.6    • MPV 01/24/2024 9.2    • Platelets 01/24/2024 265    • nRBC 01/24/2024 0    • Neutrophils Relative 01/24/2024 65    • Immat GRANS % 01/24/2024 0    • Lymphocytes Relative 01/24/2024 26    • Monocytes Relative 01/24/2024 7    • Eosinophils Relative 01/24/2024 1    • Basophils Relative 01/24/2024 1    • Neutrophils Absolute 01/24/2024 5.14    • Immature Grans Absolute 01/24/2024 0.01    • Lymphocytes Absolute 01/24/2024 2.01    • Monocytes Absolute 01/24/2024 0.55    • Eosinophils Absolute 01/24/2024 0.07    • Basophils Absolute  01/24/2024 0.05    • Sodium 01/24/2024 137    • Potassium 01/24/2024 4.3    • Chloride 01/24/2024 102    • CO2 01/24/2024 25    • ANION GAP 01/24/2024 10    • BUN 01/24/2024 23    • Creatinine 01/24/2024 0.66    • Glucose 01/24/2024 73    • Calcium 01/24/2024 9.9    • AST 01/24/2024 24    • ALT 01/24/2024 12    • Alkaline Phosphatase 01/24/2024 42    • Total Protein 01/24/2024 8.1    • Albumin 01/24/2024 4.6    • Total Bilirubin 01/24/2024 0.71    • eGFR 01/24/2024 92    • hs TnI 0hr 01/24/2024 4    • hs TnI 2hr 01/24/2024 4    • Delta 2hr hsTnI 01/24/2024 0    • Ventricular Rate 01/24/2024 64    • Atrial Rate 01/24/2024 64    • NV Interval 01/24/2024 126    • QRSD Interval 01/24/2024 90    • QT Interval 01/24/2024 434    • QTC Interval 01/24/2024 447    • P Axis 01/24/2024 58    • QRS Axis 01/24/2024 55    • T Wave Axis 01/24/2024 21    • hs TnI 4hr 01/24/2024 5    • Delta 4hr hsTnI 01/24/2024 1    • Baseline HR 01/25/2024 86    • Baseline BP 01/25/2024 144/86    • O2 sat rest 01/25/2024 99    • Stress peak HR 01/25/2024 142    • Post peak BP 01/25/2024 2    • O2 sat peak 01/25/2024 99    • Recovery HR 01/25/2024 112    • Recovery BP 01/25/2024 142/84    • O2 sat recovery 01/25/2024 99    • Rate Pressure Product 01/25/2024 284.0    • Angina Index 01/25/2024 1    • Rest Nuclear Isotope Dose 01/25/2024 10.00    • Stress Nuclear Isotope D* 01/25/2024 33.00    • Stress/rest perfusion ra* 01/25/2024 1.10    • EF (%) 01/25/2024 51    • Ventricular Rate 01/24/2024 73    • Atrial Rate 01/24/2024 73    • NV Interval 01/24/2024 128    • QRSD Interval 01/24/2024 100    • QT Interval 01/24/2024 428    • QTC Interval 01/24/2024 471    • P Axis 01/24/2024 59    • QRS Axis 01/24/2024 47    • T Wave Axis 01/24/2024 7    • Cholesterol 01/24/2024 262 (H)    • Triglycerides 01/24/2024 70    • HDL, Direct 01/24/2024 78    • LDL Calculated 01/24/2024 170 (H)    • Non-HDL-Chol (CHOL-HDL) 01/24/2024 184    • TSH 3RD GENERATON 01/24/2024  1.457    • Hemoglobin A1C 01/24/2024 5.5    • EAG 01/24/2024 111    • Protocol Name 01/25/2024 [facility] WALK    • Exercise duration (min) 01/25/2024 3    • Exercise duration (sec) 01/25/2024 0    • Post Peak Systolic BP 01/25/2024 144    • Max Diastolic Bp 01/25/2024 86    • Peak HR 01/25/2024 142    • Max Predicted Heart Rate 01/25/2024 155    • Reason for Termination 01/25/2024 Protocol Complete    • Test Indication 01/25/2024 CHEST PAIN    • Target Hr Formular 01/25/2024 (220 - Age)*100%    • Chest Pain Statement 01/25/2024 non-limiting    • Sodium 01/27/2024 139    • Potassium 01/27/2024 3.8    • Chloride 01/27/2024 108    • CO2 01/27/2024 25    • ANION GAP 01/27/2024 6    • BUN 01/27/2024 20    • Creatinine 01/27/2024 0.57 (L)    • Glucose 01/27/2024 96    • Calcium 01/27/2024 8.6    • eGFR 01/27/2024 97          Radiology Results:   Cardiac catheterization    Result Date: 1/26/2024  Narrative: •  The left ventricular systolic function is normal. Normal epicardial Coronary Arteries Nl LV function/LVEDP     Stress strip    Result Date: 1/26/2024  Narrative: Confirmed by Archive, Archive ([id number]),  Karli Butterfield ([id number]) on 1/26/2024 10:27:01 AM    NM Myocardial Perfusion Spect (Pharmacological Induced Stress and/or Rest)    Result Date: 1/25/2024  Narrative: •  Stress ECG: No ST deviation is noted. The ECG was equivocal for ischemia. The stress ECG is equivocal for ischemia after pharmacologic vasodilation. •  Perfusion: There are no perfusion defects. •  Stress Function: Left ventricular function post-stress is normal. Stress ejection fraction is 51%. •  Stress Combined Conclusion: Left ventricular perfusion is normal.     XR chest 1 view portable    Result Date: 1/24/2024  Narrative: CHEST INDICATION:   cp. COMPARISON: Calcium scoring CT 7/23/2022, renal CT 1/27/2020. EXAM PERFORMED/VIEWS:  XR CHEST PORTABLE. FINDINGS: Cardiomediastinal silhouette normal. Lungs clear. No effusion or pneumothorax. Upper  abdomen normal. Bones normal for age.     Impression: No acute cardiopulmonary disease. Workstation performed: [id number]       Ryan García PA-C

## 2024-02-08 NOTE — PATIENT INSTRUCTIONS
Scheduled date of EGD(as of today): Feb. 15, 2024  Physician performing EGD: Dr. Beal  Location of EGD: [facility]  Instructions reviewed with patient by: Karli CALLOWAY  Clearances: n/a

## 2024-02-08 NOTE — PROGRESS NOTES
[facility] - New Patient Office Visit  Celeste Bryant 63 y.o. female MRN: [id number]  Encounter: [id number]          ASSESSMENT AND PLAN:      1. Non-cardiac chest pain  -In the emergency room on January 24, 2024 for chest pain  -Troponins negative and EKG normal.  -Cardiac catheterization negative.  -Pain felt to be noncardiac in origin  -Referred to GI    2. Epigastric pain  -taking acid suppression did not help  -Plan EGD  - This risks of this procedure include but are not limited to; anesthesia complications, injury/perforation of the bowel, infection and bleeding.    3. Screen colon  - patient defers colonoscopy  - recommend yearly FIT and cologuard every 3 years      ______________________________________________________________________    Chief Complaint: Noncardiac chest pain    HPI: This is a 63-year-old white female new to our office with past medical history of osteoarthritis, Tarlov cysts, vaginal prolapse, rectocele who presents for follow-up from the emergency room for chest pain.  Patient had extensive cardiac workup including negative troponins and EKG.  Also negative cardiac catheterization.  Pain felt to be noncardiac in origin.  She was referred to GI for further evaluation.    How long have you had the abdominal pain - several months  Where is the pain located - chest, epigastric, LUQ  How do you describe the pain - pressure  How often do you have the pain - daily  How long does the pain last - hours  Does anything make the pain worse - nothing  Does anything make the pain better - nothing  Any significant use of NSAIDS - No  Any change in bowels - No  Any black or bloody stools - No  Have you been seen by another DrAbrahan for this pain - ER recently  Any recent imaging - CXR, troponins, EKG and cath all negative  Any previous EGD - none    No pacemaker or defibrillator implanted.   No chronic kidney disease or solitary kidney.  Not on any supplemental oxygen at night.  Not  on any antiplatelet or anticoagulants.      Endoscopy History:  EGD - none  Colonoscopy - none    REVIEW OF SYSTEMS:    CONSTITUTIONAL: Denies any fever, chills, rigors, and weight loss.  HEENT: Denies hearing loss or visual disturbances.  CARDIOVASCULAR: No chest pain or palpitations.   RESPIRATORY: Denies any cough, hemoptysis, shortness of breath or dyspnea on exertion.  GASTROINTESTINAL: As noted in the History of Present Illness.   GENITOURINARY: No problems with urination. Denies any hematuria or dysuria.  NEUROLOGIC: No dizziness or vertigo, denies headaches.   MUSCULOSKELETAL: Denies any muscle or joint pain.   SKIN: Denies skin rashes or itching.   ENDOCRINE: Denies excessive thirst. Denies intolerance to heat or cold.  PSYCHOSOCIAL: Denies depression or anxiety. Denies any recent memory loss.       Historical Information   Past Medical History:   Diagnosis Date   • Actinic keratosis    • Allergic    • Anesthesia complication     after 1 THR had low BP and Heart rate   • Arthritis    • Basal cell carcinoma 10/13/2022    left neck   • Collagen disorder (HCC)     Suspected but not yet diagnosed. Waiting for genetic testing   • Eczema    • GERD (gastroesophageal reflux disease)    • Osteoarthritis     hips, knees   • Pneumonia    • Squamous cell skin cancer 09/29/2022    right cheek, inferior   • Tarlov cysts    • TMJ arthritis     more severe on the right   • Urinary tract infection 09/06/2019    treated with Macrobid 100 mg   • Varicella 1965   • Wears hearing aid in both ears      Past Surgical History:   Procedure Laterality Date   • CARDIAC CATHETERIZATION Left 1/26/2024    Procedure: Cardiac Left Heart Cath;  Surgeon: Justo Tanner MD;  [facility]: [facility];  Service: Cardiology   • COLPOPEXY VAGINAL EXTRAPERITONEAL (VEC) N/A 10/29/2020    Procedure: VEC;  Surgeon: Wolfgang Salamanca MD;  Location: [facility];  Service: UroGynecology          • COLPORRHAPHY N/A 10/29/2020    Procedure: A&P  COLPORRHAPHY;  Surgeon: Wolfgang Salamanca MD;  Location: [facility];  Service: UroGynecology          • CYSTOSCOPY     • CYSTOSCOPY N/A 10/29/2020    Procedure: CYSTO;  Surgeon: Wolfgang Salamanca MD;  [facility];  Service: UroGynecology          • JOINT REPLACEMENT Bilateral     hip   • LAMINECTOMY  2019    Surgery from about L5-S4 for Tarlov cysts   • MOHS SURGERY Right 10/19/2022    SCCIS right cheek, inferior-Dr Romo   • MOHS SURGERY Left 2022    BCC left neck-Dr Romo   • PUBOVAGINAL SLING N/A 10/29/2020    Procedure: PV SLING;  Surgeon: Wolfgang Salamanca MD;  Location[facility];  Service: UroGynecology          • SKIN BIOPSY     • TOTAL HIP ARTHROPLASTY Left 02/15/2018   • TOTAL HIP ARTHROPLASTY Right 01/15/2019   • URETHRAL DILATION       Social History   Social History     Substance and Sexual Activity   Alcohol Use No     Social History     Substance and Sexual Activity   Drug Use No     Social History     Tobacco Use   Smoking Status Former   • Current packs/day: 0.00   • Average packs/day: 1 pack/day for 3.0 years (3.0 ttl pk-yrs)   • Types: Cigarettes   • Start date:    • Quit date:    • Years since quittin.1   Smokeless Tobacco Never     Family History   Problem Relation Age of Onset   • Hypertension Mother    • Diabetes Mother    • Cancer Mother         breast   • Coronary artery disease Mother    • Arrhythmia Mother    • Clotting disorder Mother         clot in leg   • Breast cancer Mother 65   • Heart failure Mother         Stage 3 CHF   • Hyperlipidemia Mother    • Cancer Father         lung   • Osteoarthritis Father    • Hyperlipidemia Sister    • Diabetes Paternal Grandmother    • Diabetes Paternal Grandfather    • Crohn's disease Sister    • Migraines Son    • No Known Problems Daughter    • No Known Problems Sister    • No Known Problems Daughter    • No Known Problems Maternal Aunt    • No Known Problems Maternal Aunt    • No Known Problems Maternal Aunt    • No  "Known Problems Maternal Aunt    • Stroke Neg Hx    • Anuerysm Neg Hx        Meds/Allergies       Current Outpatient Medications:   •  acetaminophen (TYLENOL) 500 mg tablet  •  rosuvastatin (CRESTOR) 10 MG tablet    Allergies   Allergen Reactions   • Sulfa Antibiotics Rash           Objective     Blood pressure 122/82, pulse 85, height 5' 8\" (1.727 m), weight 71.3 kg (157 lb 3.2 oz), SpO2 99%, not currently breastfeeding. Body mass index is 23.9 kg/m².        PHYSICAL EXAM:      General Appearance:   Alert, cooperative, no distress, appears stated age   HEENT:   Normocephalic, atraumatic, anicteric.     Neck:  Supple, symmetrical   Lungs:   Clear to auscultation bilaterally; no rales, rhonchi or wheezing; respirations unlabored    Heart::   Regular rate and rhythm; no murmur, rub, or gallop.   Abdomen:   Soft, non-tender, non-distended; normal bowel sounds; no masses, no organomegaly    Genitalia:   Deferred    Rectal:   Deferred    Extremities:  No cyanosis, clubbing or edema    Pulses:  Not assessed   Skin:  No jaundice, rashes, or lesions    Lymph nodes:  Not assessed       Lab Results:   No visits with results within 1 Day(s) from this visit.   Latest known visit with results is:   Admission on 01/24/2024, Discharged on 01/27/2024   Component Date Value   • WBC 01/24/2024 7.83    • RBC 01/24/2024 4.30    • Hemoglobin 01/24/2024 13.6    • Hematocrit 01/24/2024 41.9    • MCV 01/24/2024 97    • MCH 01/24/2024 31.6    • MCHC 01/24/2024 32.5    • RDW 01/24/2024 12.6    • MPV 01/24/2024 9.2    • Platelets 01/24/2024 265    • nRBC 01/24/2024 0    • Neutrophils Relative 01/24/2024 65    • Immat GRANS % 01/24/2024 0    • Lymphocytes Relative 01/24/2024 26    • Monocytes Relative 01/24/2024 7    • Eosinophils Relative 01/24/2024 1    • Basophils Relative 01/24/2024 1    • Neutrophils Absolute 01/24/2024 5.14    • Immature Grans Absolute 01/24/2024 0.01    • Lymphocytes Absolute 01/24/2024 2.01    • Monocytes Absolute " 01/24/2024 0.55    • Eosinophils Absolute 01/24/2024 0.07    • Basophils Absolute 01/24/2024 0.05    • Sodium 01/24/2024 137    • Potassium 01/24/2024 4.3    • Chloride 01/24/2024 102    • CO2 01/24/2024 25    • ANION GAP 01/24/2024 10    • BUN 01/24/2024 23    • Creatinine 01/24/2024 0.66    • Glucose 01/24/2024 73    • Calcium 01/24/2024 9.9    • AST 01/24/2024 24    • ALT 01/24/2024 12    • Alkaline Phosphatase 01/24/2024 42    • Total Protein 01/24/2024 8.1    • Albumin 01/24/2024 4.6    • Total Bilirubin 01/24/2024 0.71    • eGFR 01/24/2024 92    • hs TnI 0hr 01/24/2024 4    • hs TnI 2hr 01/24/2024 4    • Delta 2hr hsTnI 01/24/2024 0    • Ventricular Rate 01/24/2024 64    • Atrial Rate 01/24/2024 64    • AZ Interval 01/24/2024 126    • QRSD Interval 01/24/2024 90    • QT Interval 01/24/2024 434    • QTC Interval 01/24/2024 447    • P Axis 01/24/2024 58    • QRS Axis 01/24/2024 55    • T Wave Axis 01/24/2024 21    • hs TnI 4hr 01/24/2024 5    • Delta 4hr hsTnI 01/24/2024 1    • Baseline HR 01/25/2024 86    • Baseline BP 01/25/2024 144/86    • O2 sat rest 01/25/2024 99    • Stress peak HR 01/25/2024 142    • Post peak BP 01/25/2024 2    • O2 sat peak 01/25/2024 99    • Recovery HR 01/25/2024 112    • Recovery BP 01/25/2024 142/84    • O2 sat recovery 01/25/2024 99    • Rate Pressure Product 01/25/2024 284.0    • Angina Index 01/25/2024 1    • Rest Nuclear Isotope Dose 01/25/2024 10.00    • Stress Nuclear Isotope D* 01/25/2024 33.00    • Stress/rest perfusion ra* 01/25/2024 1.10    • EF (%) 01/25/2024 51    • Ventricular Rate 01/24/2024 73    • Atrial Rate 01/24/2024 73    • AZ Interval 01/24/2024 128    • QRSD Interval 01/24/2024 100    • QT Interval 01/24/2024 428    • QTC Interval 01/24/2024 471    • P Axis 01/24/2024 59    • QRS Axis 01/24/2024 47    • T Wave Axis 01/24/2024 7    • Cholesterol 01/24/2024 262 (H)    • Triglycerides 01/24/2024 70    • HDL, Direct 01/24/2024 78    • LDL Calculated 01/24/2024 170  (H)    • Non-HDL-Chol (CHOL-HDL) 01/24/2024 184    • TSH 3RD GENERATON 01/24/2024 1.457    • Hemoglobin A1C 01/24/2024 5.5    • EAG 01/24/2024 111    • Protocol Name 01/25/2024 [facility] WALK    • Exercise duration (min) 01/25/2024 3    • Exercise duration (sec) 01/25/2024 0    • Post Peak Systolic BP 01/25/2024 144    • Max Diastolic Bp 01/25/2024 86    • Peak HR 01/25/2024 142    • Max Predicted Heart Rate 01/25/2024 155    • Reason for Termination 01/25/2024 Protocol Complete    • Test Indication 01/25/2024 CHEST PAIN    • Target Hr Formular 01/25/2024 (220 - Age)*100%    • Chest Pain Statement 01/25/2024 non-limiting    • Sodium 01/27/2024 139    • Potassium 01/27/2024 3.8    • Chloride 01/27/2024 108    • CO2 01/27/2024 25    • ANION GAP 01/27/2024 6    • BUN 01/27/2024 20    • Creatinine 01/27/2024 0.57 (L)    • Glucose 01/27/2024 96    • Calcium 01/27/2024 8.6    • eGFR 01/27/2024 97          Radiology Results:   Cardiac catheterization    Result Date: 1/26/2024  Narrative: •  The left ventricular systolic function is normal. Normal epicardial Coronary Arteries Nl LV function/LVEDP     Stress strip    Result Date: 1/26/2024  Narrative: Confirmed by Archive, Archive ([id number]),  Karli Butterfield ([id number]) on 1/26/2024 10:27:01 AM    NM Myocardial Perfusion Spect (Pharmacological Induced Stress and/or Rest)    Result Date: 1/25/2024  Narrative: •  Stress ECG: No ST deviation is noted. The ECG was equivocal for ischemia. The stress ECG is equivocal for ischemia after pharmacologic vasodilation. •  Perfusion: There are no perfusion defects. •  Stress Function: Left ventricular function post-stress is normal. Stress ejection fraction is 51%. •  Stress Combined Conclusion: Left ventricular perfusion is normal.     XR chest 1 view portable    Result Date: 1/24/2024  Narrative: CHEST INDICATION:   cp. COMPARISON: Calcium scoring CT 7/23/2022, renal CT 1/27/2020. EXAM PERFORMED/VIEWS:  XR CHEST PORTABLE. FINDINGS:  Cardiomediastinal silhouette normal. Lungs clear. No effusion or pneumothorax. Upper abdomen normal. Bones normal for age.     Impression: No acute cardiopulmonary disease. Workstation performed: [id number]       Ryan García PA-C

## 2024-02-10 NOTE — PROGRESS NOTES
Cardiology  Hospital Follow up  Office Visit Note  Celeste Bryant   63 y.o.   female   MRN: [id number]  [facility] [place]  [address] [facility] [address]  [address]  [place] [address] [phone]    PCP: Erick Cha DC  Cardiologist: will be Dr. Dean            Summary of recommendations  Mediterranean diet  She will follow her lipids with her PCP given that they were originally within range, and recently escalated  48-hour Holter monitor  Echocardiogram  Continue regular exercise: 150 minutes moderate level activity, weekly recommended  Follow up will be scheduled with Dr Dean 3 months  She has deferred colonoscopy ( is under the care of GI)        Assessment/plan  Atypical chest pain  [facility] 1/26/2024 normal epicardial coronary arteries  CAC score 2022: 0   /89.  Monitor-  Hyperlipidemia, initiated on rosuvastatin 10 mg daily 1/2024 for primary prevention, however, she stopped this.  She like to focus on lifestyle modification.  Heart healthy diet recommended.  This can be followed by primary care   Latest Reference Range & Units 05/05/16 09:29 04/23/20 07:16 01/24/24 13:42   Cholesterol See Comment mg/dL 174 191 262 (H)   Triglycerides See Comment mg/dL 84 87 70   HDL >=50 mg/dL 66 (H) 80 78   Non-HDL Cholesterol mg/dl  111 184   LDL Calculated 0 - 100 mg/dL 91 94 170 (H)   Cardiac testing  CAC score 7/33604:zero  Nuclear stress test 2018: No ischemia  SPECT 1/25/24.left ventricular perfusion is normal. Nornal EF   [facility] 1/26/24  Normal epicardial Coronary Arteries  Nl LV function/LVEDP                HPI  Celeste Bryant is a 64 yo female with no known history of CAD , heart failure nor arrhythmia.  She had a nonischemic stress test in 2018.  She had a zero coronary artery calcium score in 2022.    She used to work in Social work.  Her daughter is an ED RN    Adm 1/24-1/27/24  CC: Worsening chest pain,  Troponins: Negative  EKG: Nonspecific ST-T wave changes  SPECT: Left ventricular  perfusion is normal.  Normal EF.  Despite the above, she had persistent chest pain and therefore left heart catheterization was pursued  She underwent left heart catheterization.  This showed normal epicardial coronary arteries with normal LV function/LVEDP  Referred to GI    2/12/24  Hospital follow-up  ROS: intermittent sharp chest pains.  Occasional palpitations.  The symptoms can occur daily.  Some radiation into her left arm.  /89  Together reviewed her coronary angiogram diagram and her stress test both were normal  To further evaluate her symptoms we will pursue a 48-hour monitor and an echocardiogram.  She elected to stop the rosuvastatin and focus on lifestyle modifications.  She will follow-up lipids with her PCP.        Assessment  Diagnoses and all orders for this visit:    Hospital discharge follow-up    Hyperlipidemia, unspecified hyperlipidemia type  -     Ambulatory referral to Cardiology  -     Echo complete w/ contrast if indicated; Future    Chest pain, unspecified type  -     Echo complete w/ contrast if indicated; Future          Past Medical History:   Diagnosis Date    Actinic keratosis     Allergic     Anesthesia complication     after 1 THR had low BP and Heart rate    Arthritis     Basal cell carcinoma 10/13/2022    left neck    Collagen disorder (HCC)     Suspected but not yet diagnosed. Waiting for genetic testing    Eczema     GERD (gastroesophageal reflux disease)     Osteoarthritis     hips, knees    Pneumonia     Squamous cell skin cancer 09/29/2022    right cheek, inferior    Tarlov cysts     TMJ arthritis     more severe on the right    Urinary tract infection 09/06/2019    treated with Macrobid 100 mg    Varicella 1965    Wears hearing aid in both ears        Review of Systems   Constitutional: Negative for chills.   Cardiovascular:  Positive for chest pain and palpitations. Negative for claudication, cyanosis, dyspnea on exertion, irregular heartbeat, leg swelling,  near-syncope, orthopnea, paroxysmal nocturnal dyspnea and syncope.   Respiratory:  Negative for cough and shortness of breath.    Gastrointestinal:  Negative for heartburn and nausea.   Neurological:  Negative for dizziness, focal weakness, headaches, light-headedness and weakness.   All other systems reviewed and are negative.      Allergies   Allergen Reactions    Sulfa Antibiotics Rash     .    Current Outpatient Medications:     acetaminophen (TYLENOL) 500 mg tablet, Take 500-1,000 mg by mouth every 6 (six) hours as needed , Disp: , Rfl:     rosuvastatin (CRESTOR) 10 MG tablet, Take 1 tablet (10 mg total) by mouth daily (Patient not taking: Reported on 2024), Disp: 30 tablet, Rfl: 0        Social History     Socioeconomic History    Marital status: /Civil Union     Spouse name: Not on file    Number of children: Not on file    Years of education: Not on file    Highest education level: Not on file   Occupational History    Not on file   Tobacco Use    Smoking status: Former     Current packs/day: 0.00     Average packs/day: 1 pack/day for 3.0 years (3.0 ttl pk-yrs)     Types: Cigarettes     Start date:      Quit date:      Years since quittin.1    Smokeless tobacco: Never   Vaping Use    Vaping status: Never Used   Substance and Sexual Activity    Alcohol use: No    Drug use: No    Sexual activity: Yes     Partners: Male     Birth control/protection: Post-menopausal   Other Topics Concern    Not on file   Social History Narrative    Not on file     Social Determinants of Health     Financial Resource Strain: Not on file   Food Insecurity: Not on file   Transportation Needs: Not on file   Physical Activity: Not on file   Stress: Not on file   Social Connections: Not on file   Intimate Partner Violence: Not on file   Housing Stability: Not on file       Family History   Problem Relation Age of Onset    Hypertension Mother     Diabetes Mother     Cancer Mother         breast    Coronary  "artery disease Mother     Arrhythmia Mother     Clotting disorder Mother         clot in leg    Breast cancer Mother 65    Heart failure Mother         Stage 3 CHF    Hyperlipidemia Mother     Cancer Father         lung    Osteoarthritis Father     Hyperlipidemia Sister     Diabetes Paternal Grandmother     Diabetes Paternal Grandfather     Crohn's disease Sister     Migraines Son     No Known Problems Daughter     No Known Problems Sister     No Known Problems Daughter     No Known Problems Maternal Aunt     No Known Problems Maternal Aunt     No Known Problems Maternal Aunt     No Known Problems Maternal Aunt     Stroke Neg Hx     Anuerysm Neg Hx        Physical Exam  Vitals and nursing note reviewed.   Constitutional:       General: She is not in acute distress.     Appearance: She is not diaphoretic.   HENT:      Head: Normocephalic and atraumatic.   Eyes:      Conjunctiva/sclera: Conjunctivae normal.   Cardiovascular:      Rate and Rhythm: Normal rate and regular rhythm.      Pulses: Intact distal pulses.      Heart sounds: Normal heart sounds.   Pulmonary:      Effort: Pulmonary effort is normal.      Breath sounds: Normal breath sounds.   Abdominal:      General: Bowel sounds are normal.      Palpations: Abdomen is soft.   Musculoskeletal:         General: Normal range of motion.      Cervical back: Normal range of motion and neck supple.   Skin:     General: Skin is warm and dry.   Neurological:      Mental Status: She is alert and oriented to person, place, and time.         Vitals: Blood pressure 132/89, pulse 79, height 5' 8\" (1.727 m), weight 71.8 kg (158 lb 3.2 oz), SpO2 99%, not currently breastfeeding.   Wt Readings from Last 3 Encounters:   02/12/24 71.8 kg (158 lb 3.2 oz)   02/08/24 71.3 kg (157 lb 3.2 oz)   01/24/24 73.4 kg (161 lb 13.1 oz)         Labs & Results:  Lab Results   Component Value Date    WBC 7.83 01/24/2024    HGB 13.6 01/24/2024    HCT 41.9 01/24/2024    MCV 97 01/24/2024     " "01/24/2024     No results found for: \"BNP\"  No components found for: \"CHEM\"  No results found for: \"CKTOTAL\", \"TROPONINI\", \"TROPONINT\", \"CKMBINDEX\"  No results found for this or any previous visit.    No results found for this or any previous visit.      This note was completed in part utilizing [employer] direct voice recognition software.   Grammatical errors, random word insertion, spelling mistakes, and incomplete sentences may be an occasional consequence of the system secondary to software limitations, ambient noise and hardware issues. At the time of dictation, efforts were made to edit, clarify and /or correct errors.  Please read the chart carefully and recognize, using context, where substitutions have occurred.  If you have any questions or concerns about the context, text or information contained within the body of this dictation, please contact myself, the provider, for further clarification      "

## 2024-02-12 ENCOUNTER — OFFICE VISIT (OUTPATIENT)
Dept: CARDIOLOGY CLINIC | Facility: CLINIC | Age: 64
End: 2024-02-12
Payer: MEDICARE

## 2024-02-12 VITALS
HEART RATE: 79 BPM | BODY MASS INDEX: 23.98 KG/M2 | HEIGHT: 68 IN | WEIGHT: 158.2 LBS | DIASTOLIC BLOOD PRESSURE: 89 MMHG | SYSTOLIC BLOOD PRESSURE: 132 MMHG | OXYGEN SATURATION: 99 %

## 2024-02-12 DIAGNOSIS — E78.5 HYPERLIPIDEMIA, UNSPECIFIED HYPERLIPIDEMIA TYPE: ICD-10-CM

## 2024-02-12 DIAGNOSIS — R00.2 PALPITATION: ICD-10-CM

## 2024-02-12 DIAGNOSIS — R07.9 CHEST PAIN, UNSPECIFIED TYPE: ICD-10-CM

## 2024-02-12 DIAGNOSIS — Z09 HOSPITAL DISCHARGE FOLLOW-UP: Primary | ICD-10-CM

## 2024-02-12 PROCEDURE — 99215 OFFICE O/P EST HI 40 MIN: CPT | Performed by: NURSE PRACTITIONER

## 2024-02-12 NOTE — LETTER
February 12, 2024     Erick Cha DC  [address] [place] [id number]    Patient: Celeste Bryant   YOB: 1960   Date of Visit: 2/12/2024       Dear Dr. Cha:    Thank you for referring Celeste Bryant to me for evaluation. Below are my notes for this consultation.    If you have questions, please do not hesitate to call me. I look forward to following your patient along with you.         Sincerely,        JOSÉ MANUEL Sorensen        CC: MD Mary Jo Medley CRNP  2/12/2024  8:08 AM  Sign when Signing Visit  Cardiology  Hospital Follow up  Office Visit Note  Celeste Bryant   63 y.o.   female   MRN: [id number]  [facility] [place]  [address] [facility]  [address]  [facility] [address]  [phone]  [phone]    PCP: Erick Cha DC  Cardiologist: will be Dr. Dean            Summary of recommendations  Mediterranean diet  She will follow her lipids with her PCP given that they were originally within range, and recently escalated  48-hour Holter monitor  Echocardiogram  Continue regular exercise: 150 minutes moderate level activity, weekly recommended  Follow up will be scheduled with Dr Dean 3 months  She has deferred colonoscopy ( is under the care of GI)        Assessment/plan  Atypical chest pain  [facility] 1/26/2024 normal epicardial coronary arteries  CAC score 2022: 0   /89.  Monitor-  Hyperlipidemia, initiated on rosuvastatin 10 mg daily 1/2024 for primary prevention, however, she stopped this.  She like to focus on lifestyle modification.  Heart healthy diet recommended.  This can be followed by primary care   Latest Reference Range & Units 05/05/16 09:29 04/23/20 07:16 01/24/24 13:42   Cholesterol See Comment mg/dL 174 191 262 (H)   Triglycerides See Comment mg/dL 84 87 70   HDL >=50 mg/dL 66 (H) 80 78   Non-HDL Cholesterol mg/dl  111 184   LDL Calculated 0 - 100 mg/dL 91 94 170 (H)   Cardiac testing  CAC score 7/32022:zero  Nuclear stress test 2018: No  ischemia  SPECT 1/25/24.left ventricular perfusion is normal. Nornal EF   LHC 1/26/24  Normal epicardial Coronary Arteries  Nl LV function/LVEDP                LUANA Bryant is a 64 yo female with no known history of CAD , heart failure nor arrhythmia.  She had a nonischemic stress test in 2018.  She had a zero coronary artery calcium score in 2022.    She used to work in Social work.  Her daughter is an ED RN    Adm 1/24-1/27/24  CC: Worsening chest pain,  Troponins: Negative  EKG: Nonspecific ST-T wave changes  SPECT: Left ventricular perfusion is normal.  Normal EF.  Despite the above, she had persistent chest pain and therefore left heart catheterization was pursued  She underwent left heart catheterization.  This showed normal epicardial coronary arteries with normal LV function/LVEDP  Referred to GI    2/12/24  Hospital follow-up  ROS: intermittent sharp chest pains.  Occasional palpitations.  The symptoms can occur daily.  Some radiation into her left arm.  /89  Together reviewed her coronary angiogram diagram and her stress test both were normal  To further evaluate her symptoms we will pursue a 48-hour monitor and an echocardiogram.  She elected to stop the rosuvastatin and focus on lifestyle modifications.  She will follow-up lipids with her PCP.        Assessment  Diagnoses and all orders for this visit:    Hospital discharge follow-up    Hyperlipidemia, unspecified hyperlipidemia type  -     Ambulatory referral to Cardiology  -     Echo complete w/ contrast if indicated; Future    Chest pain, unspecified type  -     Echo complete w/ contrast if indicated; Future          Past Medical History:   Diagnosis Date   • Actinic keratosis    • Allergic    • Anesthesia complication     after 1 THR had low BP and Heart rate   • Arthritis    • Basal cell carcinoma 10/13/2022    left neck   • Collagen disorder (HCC)     Suspected but not yet diagnosed. Waiting for genetic testing   • Eczema    • GERD  (gastroesophageal reflux disease)    • Osteoarthritis     hips, knees   • Pneumonia    • Squamous cell skin cancer 2022    right cheek, inferior   • Tarlov cysts    • TMJ arthritis     more severe on the right   • Urinary tract infection 2019    treated with Macrobid 100 mg   • Varicella 1965   • Wears hearing aid in both ears        Review of Systems   Constitutional: Negative for chills.   Cardiovascular:  Positive for chest pain and palpitations. Negative for claudication, cyanosis, dyspnea on exertion, irregular heartbeat, leg swelling, near-syncope, orthopnea, paroxysmal nocturnal dyspnea and syncope.   Respiratory:  Negative for cough and shortness of breath.    Gastrointestinal:  Negative for heartburn and nausea.   Neurological:  Negative for dizziness, focal weakness, headaches, light-headedness and weakness.   All other systems reviewed and are negative.      Allergies   Allergen Reactions   • Sulfa Antibiotics Rash     .    Current Outpatient Medications:   •  acetaminophen (TYLENOL) 500 mg tablet, Take 500-1,000 mg by mouth every 6 (six) hours as needed , Disp: , Rfl:   •  rosuvastatin (CRESTOR) 10 MG tablet, Take 1 tablet (10 mg total) by mouth daily (Patient not taking: Reported on 2024), Disp: 30 tablet, Rfl: 0        Social History     Socioeconomic History   • Marital status: /Civil Union     Spouse name: Not on file   • Number of children: Not on file   • Years of education: Not on file   • Highest education level: Not on file   Occupational History   • Not on file   Tobacco Use   • Smoking status: Former     Current packs/day: 0.00     Average packs/day: 1 pack/day for 3.0 years (3.0 ttl pk-yrs)     Types: Cigarettes     Start date:      Quit date:      Years since quittin.1   • Smokeless tobacco: Never   Vaping Use   • Vaping status: Never Used   Substance and Sexual Activity   • Alcohol use: No   • Drug use: No   • Sexual activity: Yes     Partners: Male      Birth control/protection: Post-menopausal   Other Topics Concern   • Not on file   Social History Narrative   • Not on file     Social Determinants of Health     Financial Resource Strain: Not on file   Food Insecurity: Not on file   Transportation Needs: Not on file   Physical Activity: Not on file   Stress: Not on file   Social Connections: Not on file   Intimate Partner Violence: Not on file   Housing Stability: Not on file       Family History   Problem Relation Age of Onset   • Hypertension Mother    • Diabetes Mother    • Cancer Mother         breast   • Coronary artery disease Mother    • Arrhythmia Mother    • Clotting disorder Mother         clot in leg   • Breast cancer Mother 65   • Heart failure Mother         Stage 3 CHF   • Hyperlipidemia Mother    • Cancer Father         lung   • Osteoarthritis Father    • Hyperlipidemia Sister    • Diabetes Paternal Grandmother    • Diabetes Paternal Grandfather    • Crohn's disease Sister    • Migraines Son    • No Known Problems Daughter    • No Known Problems Sister    • No Known Problems Daughter    • No Known Problems Maternal Aunt    • No Known Problems Maternal Aunt    • No Known Problems Maternal Aunt    • No Known Problems Maternal Aunt    • Stroke Neg Hx    • Anuerysm Neg Hx        Physical Exam  Vitals and nursing note reviewed.   Constitutional:       General: She is not in acute distress.     Appearance: She is not diaphoretic.   HENT:      Head: Normocephalic and atraumatic.   Eyes:      Conjunctiva/sclera: Conjunctivae normal.   Cardiovascular:      Rate and Rhythm: Normal rate and regular rhythm.      Pulses: Intact distal pulses.      Heart sounds: Normal heart sounds.   Pulmonary:      Effort: Pulmonary effort is normal.      Breath sounds: Normal breath sounds.   Abdominal:      General: Bowel sounds are normal.      Palpations: Abdomen is soft.   Musculoskeletal:         General: Normal range of motion.      Cervical back: Normal range of motion  "and neck supple.   Skin:     General: Skin is warm and dry.   Neurological:      Mental Status: She is alert and oriented to person, place, and time.         Vitals: Blood pressure 132/89, pulse 79, height 5' 8\" (1.727 m), weight 71.8 kg (158 lb 3.2 oz), SpO2 99%, not currently breastfeeding.   Wt Readings from Last 3 Encounters:   02/12/24 71.8 kg (158 lb 3.2 oz)   02/08/24 71.3 kg (157 lb 3.2 oz)   01/24/24 73.4 kg (161 lb 13.1 oz)         Labs & Results:  Lab Results   Component Value Date    WBC 7.83 01/24/2024    HGB 13.6 01/24/2024    HCT 41.9 01/24/2024    MCV 97 01/24/2024     01/24/2024     No results found for: \"BNP\"  No components found for: \"CHEM\"  No results found for: \"CKTOTAL\", \"TROPONINI\", \"TROPONINT\", \"CKMBINDEX\"  No results found for this or any previous visit.    No results found for this or any previous visit.      This note was completed in part utilizing [employer] direct voice recognition software.   Grammatical errors, random word insertion, spelling mistakes, and incomplete sentences may be an occasional consequence of the system secondary to software limitations, ambient noise and hardware issues. At the time of dictation, efforts were made to edit, clarify and /or correct errors.  Please read the chart carefully and recognize, using context, where substitutions have occurred.  If you have any questions or concerns about the context, text or information contained within the body of this dictation, please contact myself, the provider, for further clarification      "

## 2024-02-12 NOTE — PATIENT INSTRUCTIONS
Mediterranean Diet   AMBULATORY CARE:   A Mediterranean diet  is a meal plan that includes foods that are commonly eaten in countries that border the [place]. This meal plan may provide several health benefits. These include losing or maintaining weight, and decreasing blood pressure, blood sugar, and cholesterol levels. It may also help protect against certain health conditions such as heart disease, cancer, type 2 diabetes, and Alzheimer disease. Work with a dietitian to develop a meal plan that is right for you.  Foods to include in the Mediterranean diet:   Include fruits and vegetables in each meal.  Eat a variety of fresh fruits and vegetables.    Choose whole grains every day.  These foods include whole-grain breads, pastas, and cereals. It also includes brown rice, quinoa, and millet.    Use unsaturated fats instead of saturated fats.  Cook with olive or canola oil. Limit saturated fats, such as butter, margarine, and shortening. Saturated fat is an unhealthy fat that can increase your cholesterol levels.    Choose plant foods, poultry, and fish as your main sources of protein.      Eat plant-based foods that provide protein,  such as lentils, beans, chickpeas, nuts, and seeds. Choose mostly plant-based foods in place of meat on most days of the week.    Eat protein foods high in omega-3 fats.  Fish high in omega-3 fats include salmon, trout, and tuna. Include these types of fish 1 or 2 times each week. Limit fish high in mercury, such as shark, swordfish, tilefish, and tanvi mackerel. Omega-3 fats are also found in walnuts and flaxseed.         Choose poultry (chicken or turkey)  without skin instead of red meat. Red meat is high in saturated fat. Limit eggs and high-fat meats, such as beck, sausage, and hot dogs.    Choose low-fat dairy foods  such as nonfat or 1% milk, or low-fat almond, cashew, or soy milk. Other examples include low-fat cheese, yogurt, and cottage cheese.    Limit sweets.   Limit your intake of high-sugar foods, such as soda, desserts, and candy.    Talk to your healthcare provider about alcohol.  Studies have shown that moderate intake of wine may reduce the risk of heart disease. A moderate amount of wine is 1 serving for women and men 65 years and older each day. Two servings is recommended for men 21 to 64 years of age each day. A serving of wine is 5 ounces.    Other things you need to know if you follow the Mediterranean diet:   Include foods high in iron and vitamin C.  Plant-based foods that are high in iron include spinach, beans, tofu, and artichoke. Eat a serving of vitamin C with any iron-rich food to help your body absorb more iron. Examples include oranges, strawberries, cantaloupe, broccoli, and yellow peppers.         Get regular physical activity.  The Mediterranean diet will have the most benefit if you get regular physical activity. Get 30 minutes of physical activity at least 5 days a week. Choose physical activities that increase your heart rate. Examples include walking, hiking, swimming, and riding a bike. Ask your healthcare provider about the best exercise plan for you.       © Copyright [employer] 2023 Information is for End User's use only and may not be sold, redistributed or otherwise used for commercial purposes.  The above information is an  only. It is not intended as medical advice for individual conditions or treatments. Talk to your doctor, nurse or pharmacist before following any medical regimen to see if it is safe and effective for you.

## 2024-02-14 ENCOUNTER — ANESTHESIA EVENT (OUTPATIENT)
Dept: ANESTHESIOLOGY | Facility: HOSPITAL | Age: 64
End: 2024-02-14

## 2024-02-14 ENCOUNTER — ANESTHESIA (OUTPATIENT)
Dept: ANESTHESIOLOGY | Facility: HOSPITAL | Age: 64
End: 2024-02-14

## 2024-02-14 NOTE — ANESTHESIA PREPROCEDURE EVALUATION
Procedure:  PRE-OP ONLY    Relevant Problems   CARDIO   (+) HLD (hyperlipidemia)   (+) Unstable angina (HCC)      MUSCULOSKELETAL   (+) Back pain   (+) Primary osteoarthritis of left hip   (+) TMJ arthritis        Physical Exam    Airway    Mallampati score: II  TM Distance: >3 FB  Neck ROM: full     Dental   No notable dental hx     Cardiovascular  Cardiovascular exam normal    Pulmonary  Pulmonary exam normal     Other Findings  post-pubertal.  1/24/24  nl cath    Anesthesia Plan  ASA Score- 2     Anesthesia Type- IV sedation with anesthesia with ASA Monitors.         Additional Monitors:     Airway Plan:            Plan Factors-Exercise tolerance (METS): >4 METS.    Chart reviewed. EKG reviewed. Imaging results reviewed. Existing labs reviewed. Patient summary reviewed.    Patient is not a current smoker. Patient not instructed to abstain from smoking on day of procedure. Patient did not smoke on day of surgery.    There is medical exclusion for perioperative obstructive sleep apnea risk education.        Induction- intravenous.    Postoperative Plan-     Informed Consent- Anesthetic plan and risks discussed with patient.  I personally reviewed this patient with the CRNA. Discussed and agreed on the Anesthesia Plan with the CRNA..

## 2024-02-15 ENCOUNTER — HOSPITAL ENCOUNTER (OUTPATIENT)
Dept: GASTROENTEROLOGY | Facility: AMBULARY SURGERY CENTER | Age: 64
Setting detail: OUTPATIENT SURGERY
End: 2024-02-15
Payer: MEDICARE

## 2024-02-15 ENCOUNTER — ANESTHESIA (OUTPATIENT)
Dept: GASTROENTEROLOGY | Facility: AMBULARY SURGERY CENTER | Age: 64
End: 2024-02-15

## 2024-02-15 ENCOUNTER — ANESTHESIA EVENT (OUTPATIENT)
Dept: GASTROENTEROLOGY | Facility: AMBULARY SURGERY CENTER | Age: 64
End: 2024-02-15

## 2024-02-15 VITALS
DIASTOLIC BLOOD PRESSURE: 75 MMHG | RESPIRATION RATE: 22 BRPM | SYSTOLIC BLOOD PRESSURE: 126 MMHG | OXYGEN SATURATION: 98 % | BODY MASS INDEX: 22.88 KG/M2 | HEIGHT: 68 IN | HEART RATE: 58 BPM | TEMPERATURE: 96.8 F | WEIGHT: 151 LBS

## 2024-02-15 DIAGNOSIS — R10.13 EPIGASTRIC PAIN: ICD-10-CM

## 2024-02-15 DIAGNOSIS — R07.89 NON-CARDIAC CHEST PAIN: ICD-10-CM

## 2024-02-15 PROCEDURE — 43239 EGD BIOPSY SINGLE/MULTIPLE: CPT | Performed by: INTERNAL MEDICINE

## 2024-02-15 PROCEDURE — 88305 TISSUE EXAM BY PATHOLOGIST: CPT | Performed by: PATHOLOGY

## 2024-02-15 RX ORDER — LIDOCAINE HYDROCHLORIDE 10 MG/ML
INJECTION, SOLUTION EPIDURAL; INFILTRATION; INTRACAUDAL; PERINEURAL AS NEEDED
Status: DISCONTINUED | OUTPATIENT
Start: 2024-02-15 | End: 2024-02-15

## 2024-02-15 RX ORDER — PROPOFOL 10 MG/ML
INJECTION, EMULSION INTRAVENOUS AS NEEDED
Status: DISCONTINUED | OUTPATIENT
Start: 2024-02-15 | End: 2024-02-15

## 2024-02-15 RX ORDER — SODIUM CHLORIDE, SODIUM LACTATE, POTASSIUM CHLORIDE, CALCIUM CHLORIDE 600; 310; 30; 20 MG/100ML; MG/100ML; MG/100ML; MG/100ML
INJECTION, SOLUTION INTRAVENOUS CONTINUOUS PRN
Status: DISCONTINUED | OUTPATIENT
Start: 2024-02-15 | End: 2024-02-15

## 2024-02-15 RX ADMIN — LIDOCAINE HYDROCHLORIDE 50 MG: 10 INJECTION, SOLUTION EPIDURAL; INFILTRATION; INTRACAUDAL; PERINEURAL at 11:24

## 2024-02-15 RX ADMIN — SODIUM CHLORIDE, SODIUM LACTATE, POTASSIUM CHLORIDE, AND CALCIUM CHLORIDE: .6; .31; .03; .02 INJECTION, SOLUTION INTRAVENOUS at 11:19

## 2024-02-15 RX ADMIN — PROPOFOL 100 MG: 10 INJECTION, EMULSION INTRAVENOUS at 11:25

## 2024-02-15 NOTE — ANESTHESIA PREPROCEDURE EVALUATION
Procedure:  EGD    Relevant Problems   CARDIO   (+) HLD (hyperlipidemia)   (+) Unstable angina (HCC)      MUSCULOSKELETAL   (+) Back pain   (+) Primary osteoarthritis of left hip   (+) TMJ arthritis        Physical Exam    Airway    Mallampati score: II  TM Distance: >3 FB  Neck ROM: full     Dental   No notable dental hx     Cardiovascular  Cardiovascular exam normal    Pulmonary  Pulmonary exam normal     Other Findings  post-pubertal.  1/24/24  nl cath    Anesthesia Plan  ASA Score- 2     Anesthesia Type- IV sedation with anesthesia with ASA Monitors.         Additional Monitors:     Airway Plan:            Plan Factors-Exercise tolerance (METS): >4 METS.    Chart reviewed. EKG reviewed. Imaging results reviewed. Existing labs reviewed. Patient summary reviewed.    Patient is not a current smoker. Patient not instructed to abstain from smoking on day of procedure. Patient did not smoke on day of surgery.    There is medical exclusion for perioperative obstructive sleep apnea risk education.        Induction- intravenous.    Postoperative Plan-     Informed Consent- Anesthetic plan and risks discussed with patient.  I personally reviewed this patient with the CRNA. Discussed and agreed on the Anesthesia Plan with the CRNA..

## 2024-02-15 NOTE — ANESTHESIA POSTPROCEDURE EVALUATION
Post-Op Assessment Note    CV Status:  Stable  Pain Score: 0    Pain management: adequate       Mental Status:  Alert and awake   Hydration Status:  Euvolemic   PONV Controlled:  Controlled   Airway Patency:  Patent     Post Op Vitals Reviewed: Yes    No anethesia notable event occurred.    Staff: CRNA           /63 (02/15/24 1133)    Temp (!) 96.8 °F (36 °C) (02/15/24 1133)    Pulse 60 (02/15/24 1133)   Resp 16 (02/15/24 1133)    SpO2 98 % (02/15/24 1133)

## 2024-02-15 NOTE — H&P
History and Physical -  [facility]  Celeste Bryant 63 y.o. female MRN: [id number]                  HPI: Celeste Bryant is a 63 y.o. year old female who presents for abdominal pain.      REVIEW OF SYSTEMS: Per the HPI, and otherwise unremarkable.    Historical Information   Past Medical History:   Diagnosis Date    Actinic keratosis     Allergic     Anesthesia complication     after 1 THR had low BP and Heart rate    Arthritis     Basal cell carcinoma 10/13/2022    left neck    Collagen disorder (HCC)     Suspected but not yet diagnosed. Waiting for genetic testing    Eczema     GERD (gastroesophageal reflux disease)     Osteoarthritis     hips, knees    Pneumonia     Squamous cell skin cancer 09/29/2022    right cheek, inferior    Tarlov cysts     TMJ arthritis     more severe on the right    Urinary tract infection 09/06/2019    treated with Macrobid 100 mg    Varicella 1965    Wears hearing aid in both ears      Past Surgical History:   Procedure Laterality Date    CARDIAC CATHETERIZATION Left 1/26/2024    Procedure: Cardiac Left Heart Cath;  Surgeon: Justo Tanner MD;  [facility];  Service: Cardiology    COLPOPEXY VAGINAL EXTRAPERITONEAL (VEC) N/A 10/29/2020    Procedure: VEC;  Surgeon: Wolfgang Salamanca MD;  Location: [facility];  Service: UroGynecology           COLPORRHAPHY N/A 10/29/2020    Procedure: A&P COLPORRHAPHY;  Surgeon: Wolfgang Salamanca MD;  [facility];  Service: UroGynecology           CYSTOSCOPY  1983    CYSTOSCOPY N/A 10/29/2020    Procedure: CYSTO;  Surgeon: Wolfgang Salamanca MD;  [facility];  Service: UroGynecology           JOINT REPLACEMENT Bilateral     hip    LAMINECTOMY  08/2019    Surgery from about L5-S4 for Tarlov cysts    MOHS SURGERY Right 10/19/2022    SCCIS right cheek, inferior-Dr Romo    MOHS SURGERY Left 12/06/2022    BCC left neck-Dr Romo    PUBOVAGINAL SLING N/A 10/29/2020    Procedure: PV SLING;  Surgeon: Wolfgang Salamanca MD;   "Location: [facility];  Service: UroGynecology           SKIN BIOPSY      TOTAL HIP ARTHROPLASTY Left 02/15/2018    TOTAL HIP ARTHROPLASTY Right 01/15/2019    URETHRAL DILATION       Social History   Social History     Substance and Sexual Activity   Alcohol Use No     Social History     Substance and Sexual Activity   Drug Use No     Social History     Tobacco Use   Smoking Status Former    Current packs/day: 0.00    Average packs/day: 1 pack/day for 3.0 years (3.0 ttl pk-yrs)    Types: Cigarettes    Start date:     Quit date:     Years since quittin.1   Smokeless Tobacco Never     Family History   Problem Relation Age of Onset    Hypertension Mother     Diabetes Mother     Cancer Mother         breast    Coronary artery disease Mother     Arrhythmia Mother     Clotting disorder Mother         clot in leg    Breast cancer Mother 65    Heart failure Mother         Stage 3 CHF    Hyperlipidemia Mother     Cancer Father         lung    Osteoarthritis Father     Hyperlipidemia Sister     Diabetes Paternal Grandmother     Diabetes Paternal Grandfather     Crohn's disease Sister     Migraines Son     No Known Problems Daughter     No Known Problems Sister     No Known Problems Daughter     No Known Problems Maternal Aunt     No Known Problems Maternal Aunt     No Known Problems Maternal Aunt     No Known Problems Maternal Aunt     Stroke Neg Hx     Anuerysm Neg Hx        Meds/Allergies       Current Outpatient Medications:     acetaminophen (TYLENOL) 500 mg tablet    Allergies   Allergen Reactions    Sulfa Antibiotics Rash       Objective     /78   Pulse 58   Temp (!) 96.8 °F (36 °C) (Temporal)   Resp 16   Ht 5' 8\" (1.727 m)   Wt 68.5 kg (151 lb)   SpO2 100%   BMI 22.96 kg/m²       PHYSICAL EXAM    Gen: NAD  Head: NCAT  CV: RRR  CHEST: Clear  ABD: soft, NT/ND  EXT: no edema      ASSESSMENT/PLAN:  This is a 63 y.o. year old female here for EGD, and she is stable and optimized for her " procedure.

## 2024-02-19 PROCEDURE — 88305 TISSUE EXAM BY PATHOLOGIST: CPT | Performed by: PATHOLOGY

## 2024-02-21 PROBLEM — Z01.419 GYNECOLOGIC EXAM NORMAL: Status: RESOLVED | Noted: 2019-09-24 | Resolved: 2024-02-21

## 2024-02-27 ENCOUNTER — HOSPITAL ENCOUNTER (OUTPATIENT)
Dept: CT IMAGING | Facility: HOSPITAL | Age: 64
Discharge: HOME | End: 2024-02-27
Attending: INTERNAL MEDICINE
Payer: MEDICARE

## 2024-02-27 DIAGNOSIS — R07.89 NON-CARDIAC CHEST PAIN: ICD-10-CM

## 2024-02-27 DIAGNOSIS — R10.13 EPIGASTRIC PAIN: ICD-10-CM

## 2024-02-27 PROCEDURE — G1004 CDSM NDSC: HCPCS

## 2024-02-27 PROCEDURE — 74177 CT ABD & PELVIS W/CONTRAST: CPT

## 2024-02-27 RX ADMIN — IOHEXOL 100 ML: 350 INJECTION, SOLUTION INTRAVENOUS at 14:50

## 2024-03-05 ENCOUNTER — HOSPITAL ENCOUNTER (OUTPATIENT)
Dept: NON INVASIVE DIAGNOSTICS | Facility: CLINIC | Age: 64
Discharge: HOME | End: 2024-03-05
Payer: MEDICARE

## 2024-03-05 VITALS
HEIGHT: 68 IN | WEIGHT: 151 LBS | DIASTOLIC BLOOD PRESSURE: 75 MMHG | SYSTOLIC BLOOD PRESSURE: 126 MMHG | HEART RATE: 58 BPM | BODY MASS INDEX: 22.88 KG/M2

## 2024-03-05 DIAGNOSIS — E78.5 HYPERLIPIDEMIA, UNSPECIFIED HYPERLIPIDEMIA TYPE: ICD-10-CM

## 2024-03-05 DIAGNOSIS — R07.9 CHEST PAIN, UNSPECIFIED TYPE: ICD-10-CM

## 2024-03-05 DIAGNOSIS — R00.2 PALPITATION: ICD-10-CM

## 2024-03-05 LAB
AORTIC ROOT: 3 CM
APICAL FOUR CHAMBER EJECTION FRACTION: 62 %
ASCENDING AORTA: 3 CM
BSA FOR ECHO PROCEDURE: 1.81 M2
E WAVE DECELERATION TIME: 222 MS
E/A RATIO: 0.78
FRACTIONAL SHORTENING: 38 (ref 28–44)
INTERVENTRICULAR SEPTUM IN DIASTOLE (PARASTERNAL SHORT AXIS VIEW): 1 CM
INTERVENTRICULAR SEPTUM: 1 CM (ref 0.6–1.1)
LAAS-AP2: 19.3 CM2
LAAS-AP4: 13.5 CM2
LEFT ATRIUM SIZE: 3.1 CM
LEFT ATRIUM VOLUME (MOD BIPLANE): 47 ML
LEFT ATRIUM VOLUME INDEX (MOD BIPLANE): 26 ML/M2
LEFT INTERNAL DIMENSION IN SYSTOLE: 3 CM (ref 2.1–4)
LEFT VENTRICULAR INTERNAL DIMENSION IN DIASTOLE: 4.8 CM (ref 3.5–6)
LEFT VENTRICULAR POSTERIOR WALL IN END DIASTOLE: 0.9 CM
LEFT VENTRICULAR STROKE VOLUME: 74 ML
LVSV (TEICH): 74 ML
MV E'TISSUE VEL-LAT: 11 CM/S
MV E'TISSUE VEL-SEP: 11 CM/S
MV PEAK A VEL: 0.81 M/S
MV PEAK E VEL: 63 CM/S
MV STENOSIS PRESSURE HALF TIME: 64 MS
MV VALVE AREA P 1/2 METHOD: 3.44
PA SYSTOLIC PRESSURE: 25 MMHG
RIGHT ATRIUM AREA SYSTOLE A4C: 8.9 CM2
RIGHT VENTRICLE ID DIMENSION: 3 CM
SL CV LEFT ATRIUM LENGTH A2C: 5.5 CM
SL CV LV EF: 60
SL CV PED ECHO LEFT VENTRICLE DIASTOLIC VOLUME (MOD BIPLANE) 2D: 109 ML
SL CV PED ECHO LEFT VENTRICLE SYSTOLIC VOLUME (MOD BIPLANE) 2D: 35 ML
TR MAX PG: 27 MMHG
TR PEAK VELOCITY: 2.6 M/S
TRICUSPID ANNULAR PLANE SYSTOLIC EXCURSION: 2 CM
TRICUSPID VALVE PEAK REGURGITATION VELOCITY: 2.58 M/S

## 2024-03-05 PROCEDURE — 93306 TTE W/DOPPLER COMPLETE: CPT | Performed by: INTERNAL MEDICINE

## 2024-03-05 PROCEDURE — 93225 XTRNL ECG REC<48 HRS REC: CPT

## 2024-03-05 PROCEDURE — 93306 TTE W/DOPPLER COMPLETE: CPT

## 2024-03-05 PROCEDURE — 93226 XTRNL ECG REC<48 HR SCAN A/R: CPT

## 2024-03-12 PROCEDURE — 93227 XTRNL ECG REC<48 HR R&I: CPT | Performed by: INTERNAL MEDICINE

## 2024-08-08 ENCOUNTER — OFFICE VISIT (OUTPATIENT)
Dept: DERMATOLOGY | Facility: CLINIC | Age: 64
End: 2024-08-08
Payer: MEDICARE

## 2024-08-08 VITALS — BODY MASS INDEX: 24.25 KG/M2 | TEMPERATURE: 97.7 F | WEIGHT: 160 LBS | HEIGHT: 68 IN

## 2024-08-08 DIAGNOSIS — D17.22 LIPOMA OF LEFT UPPER EXTREMITY: ICD-10-CM

## 2024-08-08 DIAGNOSIS — L81.4 LENTIGO: ICD-10-CM

## 2024-08-08 DIAGNOSIS — L57.0 KERATOSIS, ACTINIC: ICD-10-CM

## 2024-08-08 DIAGNOSIS — D48.9 NEOPLASM OF UNCERTAIN BEHAVIOR: Primary | ICD-10-CM

## 2024-08-08 DIAGNOSIS — D22.9 MULTIPLE MELANOCYTIC NEVI: ICD-10-CM

## 2024-08-08 DIAGNOSIS — L85.3 XEROSIS OF SKIN: ICD-10-CM

## 2024-08-08 DIAGNOSIS — L82.1 SEBORRHEIC KERATOSIS: ICD-10-CM

## 2024-08-08 DIAGNOSIS — Z85.89 HISTORY OF SQUAMOUS CELL CARCINOMA: ICD-10-CM

## 2024-08-08 DIAGNOSIS — Z85.828 HISTORY OF BASAL CELL CARCINOMA: ICD-10-CM

## 2024-08-08 DIAGNOSIS — D18.01 CHERRY ANGIOMA: ICD-10-CM

## 2024-08-08 PROCEDURE — 17000 DESTRUCT PREMALG LESION: CPT | Performed by: DERMATOLOGY

## 2024-08-08 PROCEDURE — 99214 OFFICE O/P EST MOD 30 MIN: CPT | Performed by: DERMATOLOGY

## 2024-08-08 PROCEDURE — 11102 TANGNTL BX SKIN SINGLE LES: CPT | Performed by: DERMATOLOGY

## 2024-08-08 PROCEDURE — 17003 DESTRUCT PREMALG LES 2-14: CPT | Performed by: DERMATOLOGY

## 2024-08-08 PROCEDURE — 88341 IMHCHEM/IMCYTCHM EA ADD ANTB: CPT | Performed by: STUDENT IN AN ORGANIZED HEALTH CARE EDUCATION/TRAINING PROGRAM

## 2024-08-08 PROCEDURE — 88342 IMHCHEM/IMCYTCHM 1ST ANTB: CPT | Performed by: STUDENT IN AN ORGANIZED HEALTH CARE EDUCATION/TRAINING PROGRAM

## 2024-08-08 PROCEDURE — 88305 TISSUE EXAM BY PATHOLOGIST: CPT | Performed by: STUDENT IN AN ORGANIZED HEALTH CARE EDUCATION/TRAINING PROGRAM

## 2024-08-08 NOTE — PATIENT INSTRUCTIONS
"  NEOPLASM OF UNCERTAIN BEHAVIOR OF SKIN    Assessment and Plan:  I have discussed with the patient that a sample of skin via a \"skin biopsy” would be potentially helpful to further make a specific diagnosis under the microscope.  Based on a thorough discussion of this condition and the management approach to it (including a comprehensive discussion of the known risks, side effects and potential benefits of treatment), the patient (family) agrees to implement the following specific plan:    Procedure:  Skin Biopsy.  After a thorough discussion of treatment options and risk/benefits/alternatives (including but not limited to local pain, scarring, dyspigmentation, blistering, possible superinfection, and inability to confirm a diagnosis via histopathology), verbal and written consent were obtained and portion of the rash was biopsied for tissue sample.  See below for consent that was obtained from patient and subsequent Procedure Note.     PROCEDURE TANGENTIAL (SHAVE) BIOPSY NOTE:      After obtaining informed consent  at which time there was a discussion about the purpose of biopsy  and low risks of infection and bleeding.  The area was prepped and draped in the usual fashion. Anesthesia was obtained with 1% lidocaine with epinephrine. A shave biopsy to an appropriate sampling depth was obtained by Shave (Dermablade or 15 blade) The resulting wound was covered with surgical ointment and bandaged appropriately.     The patient tolerated the procedure well without complications and was without signs of functional compromise.      Specimen has been sent for review by Dermatopathology.    Standard post-procedure care has been explained and has been included in written form within the patient's copy of Informed Consent.    INFORMED CONSENT DISCUSSION AND POST-OPERATIVE INSTRUCTIONS FOR PATIENT    I.  RATIONALE FOR PROCEDURE  I understand that a skin biopsy allows the Dermatologist to test a lesion or rash under the " "microscope to obtain a diagnosis.  It usually involves numbing the area with numbing medication and removing a small piece of skin; sometimes the area will be closed with sutures. In this specific procedure, sutures are not usually needed.  If any sutures are placed, then they are usually need to be removed in 2 weeks or less.    I understand that my Dermatologist recommends that a skin \"shave\" biopsy be performed today.  A local anesthetic, similar to the kind that a dentist uses when filling a cavity, will be injected with a very small needle into the skin area to be sampled.  The injected skin and tissue underneath \"will go to sleep” and become numb so no pain should be felt afterwards.  An instrument shaped like a tiny \"razor blade\" (shave biopsy instrument) will be used to cut a small piece of tissue and skin from the area so that a sample of tissue can be taken and examined more closely under the microscope.  A slight amount of bleeding will occur, but it will be stopped with direct pressure and a pressure bandage and any other appropriate methods.  I understands that a scar will form where the wound was created.  Surgical ointment will be applied to help protect the wound.  Sutures are not usually needed.    II.  RISKS AND POTENTIAL COMPLICATIONS   I understand the risks and potential complications of a skin biopsy include but are not limited to the following:  Bleeding  Infection  Pain  Scar/keloid  Skin discoloration  Incomplete Removal  Recurrence  Nerve Damage/Numbness/Loss of Function  Allergic Reaction to Anesthesia  Biopsies are diagnostic procedures and based on findings additional treatment or evaluation may be required  Loss or destruction of specimen resulting in no additional findings    My Dermatologist has explained to me the nature of the condition, the nature of the procedure, and the benefits to be reasonably expected compared with alternative approaches.  My Dermatologist has discussed the " "likelihood of major risks or complications of this procedure including the specific risks listed above, such as bleeding, infection, and scarring/keloid.  I understand that a scar is expected after this procedure.  I understand that my physician cannot predict if the scar will form a \"keloid,\" which extends beyond the borders of the wound that is created.  A keloid is a thick, painful, and bumpy scar.  A keloid can be difficult to treat, as it does not always respond well to therapy, which includes injecting cortisone directly into the keloid every few weeks.  While this usually reduces the pain and size of the scar, it does not eliminate it.      I understand that photographs may be taken before and after the procedure.  These will be maintained as part of the medical providers confidential records and may not be made available to me.  I further authorize the medical provider to use the photographs for teaching purposes or to illustrate scientific papers, books, or lectures if in his/her judgment, medical research, education, or science may benefit from its use.    I have had an opportunity to fully inquire about the risks and benefits of this procedure and its alternatives.   I have been given ample time and opportunity to ask questions and to seek a second opinion if I wished to do so.  I acknowledge that there have specifically been no guarantees as to the cosmetic results from the procedure.  I am aware that with any procedure there is always the possibility of an unexpected complication.    III. POST-PROCEDURAL CARE (WHAT YOU WILL NEED TO DO \"AFTER THE BIOPSY\" TO OPTIMIZE HEALING)    Keep the area clean and dry.  Try NOT to remove the bandage or get it wet for the first 24 hours.    Gently clean the area and apply surgical ointment (such as Vaseline petrolatum ointment, which is available \"over the counter\" and not a prescription) to the biopsy site for up to 2 weeks straight.  This acts to protect the wound " from the outside world.      Sutures are not usually placed in this procedure.  If any sutures were placed, return for suture removal as instructed (generally 1 week for the face, 2 weeks for the body).      Take Acetaminophen (Tylenol) for discomfort, if no contraindications.  Ibuprofen or aspirin could make bleeding worse.    Call our office immediately for signs of infection: fever, chills, increased redness, warmth, tenderness, discomfort/pain, or pus or foul smell coming from the wound.    WHAT TO DO IF THERE IS ANY BLEEDING?  If a small amount of bleeding is noticed, place a clean cloth over the area and apply firm pressure for ten minutes.  Check the wound after 10 minutes of direct pressure.  If bleeding persists, try one more time for an additional 10 minutes of direct pressure on the area.  If the bleeding becomes heavier or does not stop after the second attempt, or if you have any other questions about this procedure, then please call your [facility] Dermatologist by calling [phone] (SKIN).     I hereby acknowledge that I have reviewed and verified the site with my Dermatologist and have requested and authorized my Dermatologist to proceed with the procedure.           LIPOMA  Assessment and Plan:  Based on a thorough discussion of this condition and the management approach to it (including a comprehensive discussion of the known risks, side effects and potential benefits of treatment), the patient (family) agrees to implement the following specific plan:  Benign-assurance provided  Monitor for any changes     Lipoma  A lipoma is a non-cancerous tumor that is made up of fat cells. It slowly grows under the skin in the subcutaneous tissue. A person may have a single lipoma or may have many lipomas. They are very common.  Lipomas can occur in people of all ages, however, they tend to develop in adulthood and are most noticeable during middle age. They affect both sexes equally, although solitary  lipomas are more common in women whilst multiple lipomas occur more frequently in men.    The cause of lipomas is unknown. It is possible there may be genetic involvement as many patients with lipomas come from a family with a history of these tumors. Sometimes an injury such as a blunt blow to part of the body may trigger growth of a lipoma.  People are often unaware of lipomas until they have grown large enough to become visible and palpable. This growth occurs slowly over several years. Some features of lipomas include:  A dome-shaped or egg-shaped lump about 2-10 cm in diameter (some may grow even larger)   It feels soft and smooth and is easily moved under the skin with the fingers   Some have a rubbery or doughy consistency   They are most common on the shoulders, neck, trunk and arms, but they can occur anywhere on the body where fat tissue is present.    Most lipomas are symptomless, but some are painful on applying pressure. Lipomas that are tender or painful are usually angiolipomas. This means the lipoma has an increased number of small blood vessels. Painful lipomas are also a feature of adiposis dolorosa or Dercum disease.  Diagnosis of lipoma is usually made clinically by finding a soft lump under the skin. However, if there is any doubt, a deep skin biopsy can be performed which will show typical histopathological features of lipoma and its variants.    Most lipomas require no treatment. Most lipomas eventually stop growing and remain indefinitely without causing any problems. Occasionally, lipomas that interfere with the movement of adjacent muscles may require surgical removal. Several methods are available:  Simple surgical excision   Squeeze technique (a small incision is made over the lipoma and the fatty tissue is squeezed through the hole)   Liposuction         ACTINIC KERATOSIS        Assessment and Plan:  Based on a thorough discussion of this condition and the management approach to it  (including a comprehensive discussion of the known risks, side effects and potential benefits of treatment), the patient (family) agrees to implement the following specific plan:  When outside we recommend using a wide brim hat, sunglasses, long sleeve and pants, sunscreen with SPF 30+ with reapplication every 2 hours, or SPF specific clothing   liquid nitrogen to treat areas. Consent obtained. Expect area to blister, crust, and then fall off within 2 weeks. Please use vaseline.                                     PROCEDURE:  DESTRUCTION OF PRE-MALIGNANT LESIONS  After a thorough discussion of treatment options and risk/benefits/alternatives (including but not limited to local pain, scarring, dyspigmentation, blistering, and possible superinfection), verbal and written consent were obtained and the aforementioned lesions were treated on with cryotherapy using liquid nitrogen x 1 cycle for 5-10 seconds.      The patient tolerated the procedure well, and after-care instructions were provided.        HISTORY OF BASAL CELL CARCINOMA      Assessment and Plan:  Based on a thorough discussion of this condition and the management approach to it (including a comprehensive discussion of the known risks, side effects and potential benefits of treatment), the patient (family) agrees to implement the following specific plan:  Monitor for any changes  Continue with routine skin checks yearly     How can basal cell carcinoma be prevented?  The most important way to prevent BCC is to avoid sunburn. This is especially important in childhood and early life. Fair skinned individuals and those with a personal or family history of BCC should protect their skin from sun exposure daily, year-round and lifelong.  Stay indoors or under the shade in the middle of the day   Wear covering clothing   Apply high protection factor SPF50+ broad-spectrum sunscreens generously to exposed skin if outdoors   Avoid indoor tanning (sun beds,  "solaria)  Oral nicotinamide (vitamin B3) in a dose of 500 mg twice daily may reduce the number and severity of BCCs.    What is the outlook for basal cell carcinoma?  Most BCCs are cured by treatment. Cure is most likely if treatment is undertaken when the lesion is small.  About 50% of people with BCC develop a second one within 3 years of the first. They are also at increased risk of other skin cancers, especially melanoma. Regular self-skin examinations and long-term annual skin checks by an experienced health professional are recommended.     HISTORY OF SQUAMOUS CELL CARCINOMA       Assessment and Plan:  Based on a thorough discussion of this condition and the management approach to it (including a comprehensive discussion of the known risks, side effects and potential benefits of treatment), the patient (family) agrees to implement the following specific plan:  Monitor for any changes  Continue with routine skin checks yearly     How can SCC be prevented?  The most important way to prevent SCC is to avoid sunburn. This is especially important in childhood and early life. Fair skinned individuals and those with a personal or family history of BCC should protect their skin from sun exposure daily, year-round and lifelong.  Stay indoors or under the shade in the middle of the day   Wear covering clothing   Apply high protection factor SPF50+ broad-spectrum sunscreens generously to exposed skin if outdoors   Avoid indoor tanning (sun beds, solaria)      What is the outlook for SCC?  Most SCC are cured by treatment. Cure is most likely if treatment is undertaken when the lesion is small. A small percent of SCC's can spread to lymph  nodes and long term monitoring is indicated.   They are also at increased risk of other skin cancers, especially melanoma. Regular self-skin examinations and long-term annual skin checks by an experienced health professional are recommended.     MELANOCYTIC NEVI (\"Moles\")    Assessment " "and Plan:  Based on a thorough discussion of this condition and the management approach to it (including a comprehensive discussion of the known risks, side effects and potential benefits of treatment), the patient (family) agrees to implement the following specific plan:  When outside we recommend using a wide brim hat, sunglasses, long sleeve and pants, sunscreen with SPF 30+ with reapplication every 2 hours, or SPF specific clothing   Benign, reassured  Annual skin check     Melanocytic Nevi  Melanocytic nevi (\"moles\") are tan or brown, raised or flat areas of the skin which have an increased number of melanocytes. Melanocytes are the cells in our body which make pigment and account for skin color.    Some moles are present at birth (I.e., \"congenital nevi\"), while others come up later in life (i.e., \"acquired nevi\").  The sun can stimulate the body to make more moles.  Sunburns are not the only thing that triggers more moles.  Chronic sun exposure can do it too.     Clinically distinguishing a healthy mole from melanoma may be difficult, even for experienced dermatologists. The \"ABCDE's\" of moles have been suggested as a means of helping to alert a person to a suspicious mole and the possible increased risk of melanoma.  The suggestions for raising alert are as follows:    Asymmetry: Healthy moles tend to be symmetric, while melanomas are often asymmetric.  Asymmetry means if you draw a line through the mole, the two halves do not match in color, size, shape, or surface texture. Asymmetry can be a result of rapid enlargement of a mole, the development of a raised area on a previously flat lesion, scaling, ulceration, bleeding or scabbing within the mole.  Any mole that starts to demonstrate \"asymmetry\" should be examined promptly by a board certified dermatologist.     Border: Healthy moles tend to have discrete, even borders.  The border of a melanoma often blends into the normal skin and does not sharply " "delineate the mole from normal skin.  Any mole that starts to demonstrate \"uneven borders\" should be examined promptly by a board certified dermatologist.     Color: Healthy moles tend to be one color throughout.  Melanomas tend to be made up of different colors ranging from dark black, blue, white, or red.  Any mole that demonstrates a color change should be examined promptly by a board certified dermatologist.     Diameter: Healthy moles tend to be smaller than 0.6 cm in size; an exception are \"congenital nevi\" that can be larger.  Melanomas tend to grow and can often be greater than 0.6 cm (1/4 of an inch, or the size of a pencil eraser). This is only a guideline, and many normal moles may be larger than 0.6 cm without being unhealthy.  Any mole that starts to change in size (small to bigger or bigger to smaller) should be examined promptly by a board certified dermatologist.     Evolving: Healthy moles tend to \"stay the same.\"  Melanomas may often show signs of change or evolution such as a change in size, shape, color, or elevation.  Any mole that starts to itch, bleed, crust, burn, hurt, or ulcerate or demonstrate a change or evolution should be examined promptly by a board certified dermatologist.        LENTIGO    Assessment and Plan:  Based on a thorough discussion of this condition and the management approach to it (including a comprehensive discussion of the known risks, side effects and potential benefits of treatment), the patient (family) agrees to implement the following specific plan:  When outside we recommend using a wide brim hat, sunglasses, long sleeve and pants, sunscreen with SPF 30+ with reapplication every 2 hours, or SPF specific clothing       What is a lentigo?  A lentigo is a pigmented flat or slightly raised lesion with a clearly defined edge. Unlike an ephelis (freckle), it does not fade in the winter months. There are several kinds of lentigo.  The name lentigo originally referred to " its appearance resembling a small lentil. The plural of lentigo is lentigines, although “lentigos” is also in common use.    Who gets lentigines?  Lentigines can affect males and females of all ages and races. Solar lentigines are especially prevalent in fair skinned adults. Lentigines associated with syndromes are present at birth or arise during childhood.    What causes lentigines?  Common forms of lentigo are due to exposure to ultraviolet radiation:  Sun damage including sunburn   Indoor tanning   Phototherapy, especially photochemotherapy (PUVA)    Ionizing radiation, eg radiation therapy, can also cause lentigines.  Several familial syndromes associated with widespread lentigines originate from mutations in Tadeo-MAP kinase, mTOR signaling and PTEN pathways.    What is the treatment for lentigines?  Most lentigines are left alone. Attempts to lighten them may not be successful. The following approaches are used:  SPF 50+ broad-spectrum sunscreen   Hydroquinone bleaching cream   Alpha hydroxy acids   Vitamin C   Retinoids   Azelaic acid   Chemical peels  Individual lesions can be permanently removed using:  Cryotherapy   Intense pulsed light   Pigment lasers    How can lentigines be prevented?  Lentigines associated with exposure ultraviolet radiation can be prevented by very careful sun protection. Clothing is more successful at preventing new lentigines than are sunscreens.    What is the outlook for lentigines?  Lentigines usually persist. They may increase in number with age and sun exposure. Some in sun-protected sites may fade and disappear.    MARRUFO ANGIOMAS      Assessment and Plan:  Based on a thorough discussion of this condition and the management approach to it (including a comprehensive discussion of the known risks, side effects and potential benefits of treatment), the patient (family) agrees to implement the following specific plan:  Monitor for changes  Benign, reassured      Assessment and  "Plan:    Cherry angioma, also known as Sanders de Gildardo spots, are benign vascular skin lesions. A \"cherry angioma\" is a firm red, blue or purple papule, 0.1-1 cm in diameter. When thrombosed, they can appear black in colour until evaluated with a dermatoscope when the red or purple colour is more easily seen. Cherry angioma may develop on any part of the body but most often appear on the scalp, face, lips and trunk.  An angioma is due to proliferating endothelial cells; these are the cells that line the inside of a blood vessel.    Angiomas can arise in early life or later in life; the most common type of angioma is a cherry angioma.  Cherry angiomas are very common in males and females of any age or race. They are more noticeable in white skin than in skin of colour. They markedly increase in number from about the age of 40. There may be a family history of similar lesions. Eruptive cherry angiomas have been rarely reported to be associated with internal malignancy. The cause of angiomas is unknown. Genetic analysis of cherry angiomas has shown that they frequently carry specific somatic missense mutations in the GNAQ and GNA11 (Q209H) genes, which are involved in other vascular and melanocytic proliferations.      SEBORRHEIC KERATOSIS; NON-INFLAMED    Assessment and Plan:  Based on a thorough discussion of this condition and the management approach to it (including a comprehensive discussion of the known risks, side effects and potential benefits of treatment), the patient (family) agrees to implement the following specific plan:  Monitor for changes  Benign, reassured      Seborrheic Keratosis  A seborrheic keratosis is a harmless warty spot that appears during adult life as a common sign of skin aging.  Seborrheic keratoses can arise on any area of skin, covered or uncovered, with the usual exception of the palms and soles. They do not arise from mucous membranes. Seborrheic keratoses can have highly variable " "appearance.      Seborrheic keratoses are extremely common. It has been estimated that over 90% of adults over the age of 60 years have one or more of them. They occur in males and females of all races, typically beginning to erupt in the 30s or 40s. They are uncommon under the age of 20 years.  The precise cause of seborrhoeic keratoses is not known.  Seborrhoeic keratoses are considered degenerative in nature. As time goes by, seborrheic keratoses tend to become more numerous. Some people inherit a tendency to develop a very large number of them; some people may have hundreds of them.      There is no easy way to remove multiple lesions on a single occasion.  Unless a specific lesion is \"inflamed\" and is causing pain or stinging/burning or is bleeding, most insurance companies do not authorize treatment.    XEROSIS (\"DRY SKIN\")    Assessment and Plan:  Based on a thorough discussion of this condition and the management approach to it (including a comprehensive discussion of the known risks, side effects and potential benefits of treatment), the patient (family) agrees to implement the following specific plan:  Use moisturizer like Eucerin,Cerave or Aveeno Cream 3 times a day for the dry skin            Dry skin refers to skin that feels dry to touch. Dry skin has a dull surface with a rough, scaly quality. The skin is less pliable and cracked. When dryness is severe, the skin may become inflamed and fissured.  Although any body site can be dry, dry skin tends to affect the shins more than any other site.    Dry skin is lacking moisture in the outer horny cell layer (stratum corneum) and this results in cracks in the skin surface.  Dry skin is also called xerosis, xeroderma or asteatosis (lack of fat).  It can affect males and females of all ages. There is some racial variability in water and lipid content of the skin.  Dry skin that starts in early childhood may be one of about 20 types of ichthyosis (fish-scale " skin). There is often a family history of dry skin.   Dry skin is commonly seen in people with atopic dermatitis.  Nearly everyone > 60 years has dry skin.    Dry skin that begins later may be seen in people with certain diseases and conditions.  Postmenopausal women  Hypothyroidism  Chronic renal disease   Malnutrition and weight loss   Subclinical dermatitis   Treatment with certain drugs such as oral retinoids, diuretics and epidermal growth factor receptor inhibitors      What is the treatment for dry skin?  The mainstay of treatment of dry skin and ichthyosis is moisturisers/emollients. They should be applied liberally and often enough to:  Reduce itch   Improve the barrier function   Prevent entry of irritants, bacteria   Reduce transepidermal water loss.      How can dry skin be prevented?  Eliminate aggravating factors:  Reduce the frequency of bathing.   A humidifier in winter and air conditioner in summer   Compare having a short shower with a prolonged soak in a bath.   Use lukewarm, not hot, water.   Replace standard soap with a substitute such as a synthetic detergent cleanser, water-miscible emollient, bath oil, anti-pruritic tar oil, colloidal oatmeal etc.   Apply an emollient liberally and often, particularly shortly after bathing, and when itchy. The drier the skin, the thicker this should be, especially on the hands.    What is the outlook for dry skin?  A tendency to dry skin may persist life-long, or it may improve once contributing factors are controlled.

## 2024-08-08 NOTE — PROGRESS NOTES
"[facility] Dermatology Clinic Note     Patient Name: Celeste Bryant  Encounter Date: 8/8/24     Have you been cared for by a [facility] Dermatologist in the last 3 years and, if so, which description applies to you?    Yes.  I have been here within the last 3 years, and my medical history has NOT changed since that time.  I am FEMALE/of child-bearing potential.    REVIEW OF SYSTEMS:  Have you recently had or currently have any of the following? No changes in my recent health.   PAST MEDICAL HISTORY:  Have you personally ever had or currently have any of the following?  If \"YES,\" then please provide more detail. No changes in my medical history.   HISTORY OF IMMUNOSUPPRESSION: Do you have a history of any of the following:  Systemic Immunosuppression such as Diabetes, Biologic or Immunotherapy, Chemotherapy, Organ Transplantation, Bone Marrow Transplantation?  No     Answering \"YES\" requires the addition of the dotphrase \"IMMUNOSUPPRESSED\" as the first diagnosis of the patient's visit.   FAMILY HISTORY:  Any \"first degree relatives\" (parent, brother, sister, or child) with the following?    No changes in my family's known health.   PATIENT EXPERIENCE:    Do you want the Dermatologist to perform a COMPLETE skin exam today including a clinical examination under the \"bra and underwear\" areas?  Yes  If necessary, do we have your permission to call and leave a detailed message on your Preferred Phone number that includes your specific medical information?  Yes      Allergies   Allergen Reactions    Sulfa Antibiotics Rash      Current Outpatient Medications:     acetaminophen (TYLENOL) 500 mg tablet, Take 500-1,000 mg by mouth every 6 (six) hours as needed , Disp: , Rfl:           Whom besides the patient is providing clinical information about today's encounter?   NO ADDITIONAL HISTORIAN (patient alone provided history)    Physical Exam and Assessment/Plan by Diagnosis:  CHIEF COMPLAINT    64 year old male or female " "patient presents today for 6 Month Check Up.  Patient has a History of actinic keratotis, squamous cell carcinoma and basal cell carcinoma     NEOPLASM OF UNCERTAIN BEHAVIOR OF SKIN    Physical Exam:  (Anatomic Location); (Size and Morphological Description); (Differential Diagnosis):  Specimen A: Left chest; 0.5 cm thin pink papule; Diff Dx:BCC vs Lichenoid Keratosis   Pertinent Positives:  Pertinent Negatives:    Additional History of Present Condition:  has had for about 3 months. Is not going away     Assessment and Plan:  I have discussed with the patient that a sample of skin via a \"skin biopsy” would be potentially helpful to further make a specific diagnosis under the microscope.  Based on a thorough discussion of this condition and the management approach to it (including a comprehensive discussion of the known risks, side effects and potential benefits of treatment), the patient (family) agrees to implement the following specific plan:    Procedure:  Skin Biopsy.  After a thorough discussion of treatment options and risk/benefits/alternatives (including but not limited to local pain, scarring, dyspigmentation, blistering, possible superinfection, and inability to confirm a diagnosis via histopathology), verbal and written consent were obtained and portion of the rash was biopsied for tissue sample.  See below for consent that was obtained from patient and subsequent Procedure Note.     PROCEDURE TANGENTIAL (SHAVE) BIOPSY NOTE:    Performing Physician:  Dr. Rouse   Anatomic Location; Clinical Description with size (cm); Pre-Op Diagnosis:   Specimen A: Left chest; 0.5 cm thin pink papule; Diff Dx:BCC vs Lichenoid Keratosis   Post-op diagnosis: Same     Local anesthesia: 1% xylocaine with epi      Topical anesthesia: None    Hemostasis: Aluminum chloride       After obtaining informed consent  at which time there was a discussion about the purpose of biopsy  and low risks of infection and bleeding.  The area " "was prepped and draped in the usual fashion. Anesthesia was obtained with 1% lidocaine with epinephrine. A shave biopsy to an appropriate sampling depth was obtained by Shave (Dermablade or 15 blade) The resulting wound was covered with surgical ointment and bandaged appropriately.     The patient tolerated the procedure well without complications and was without signs of functional compromise.      Specimen has been sent for review by Dermatopathology.    Standard post-procedure care has been explained and has been included in written form within the patient's copy of Informed Consent.    INFORMED CONSENT DISCUSSION AND POST-OPERATIVE INSTRUCTIONS FOR PATIENT    I.  RATIONALE FOR PROCEDURE  I understand that a skin biopsy allows the Dermatologist to test a lesion or rash under the microscope to obtain a diagnosis.  It usually involves numbing the area with numbing medication and removing a small piece of skin; sometimes the area will be closed with sutures. In this specific procedure, sutures are not usually needed.  If any sutures are placed, then they are usually need to be removed in 2 weeks or less.    I understand that my Dermatologist recommends that a skin \"shave\" biopsy be performed today.  A local anesthetic, similar to the kind that a dentist uses when filling a cavity, will be injected with a very small needle into the skin area to be sampled.  The injected skin and tissue underneath \"will go to sleep” and become numb so no pain should be felt afterwards.  An instrument shaped like a tiny \"razor blade\" (shave biopsy instrument) will be used to cut a small piece of tissue and skin from the area so that a sample of tissue can be taken and examined more closely under the microscope.  A slight amount of bleeding will occur, but it will be stopped with direct pressure and a pressure bandage and any other appropriate methods.  I understands that a scar will form where the wound was created.  Surgical ointment " "will be applied to help protect the wound.  Sutures are not usually needed.    II.  RISKS AND POTENTIAL COMPLICATIONS   I understand the risks and potential complications of a skin biopsy include but are not limited to the following:  Bleeding  Infection  Pain  Scar/keloid  Skin discoloration  Incomplete Removal  Recurrence  Nerve Damage/Numbness/Loss of Function  Allergic Reaction to Anesthesia  Biopsies are diagnostic procedures and based on findings additional treatment or evaluation may be required  Loss or destruction of specimen resulting in no additional findings    My Dermatologist has explained to me the nature of the condition, the nature of the procedure, and the benefits to be reasonably expected compared with alternative approaches.  My Dermatologist has discussed the likelihood of major risks or complications of this procedure including the specific risks listed above, such as bleeding, infection, and scarring/keloid.  I understand that a scar is expected after this procedure.  I understand that my physician cannot predict if the scar will form a \"keloid,\" which extends beyond the borders of the wound that is created.  A keloid is a thick, painful, and bumpy scar.  A keloid can be difficult to treat, as it does not always respond well to therapy, which includes injecting cortisone directly into the keloid every few weeks.  While this usually reduces the pain and size of the scar, it does not eliminate it.      I understand that photographs may be taken before and after the procedure.  These will be maintained as part of the medical providers confidential records and may not be made available to me.  I further authorize the medical provider to use the photographs for teaching purposes or to illustrate scientific papers, books, or lectures if in his/her judgment, medical research, education, or science may benefit from its use.    I have had an opportunity to fully inquire about the risks and benefits of " "this procedure and its alternatives.   I have been given ample time and opportunity to ask questions and to seek a second opinion if I wished to do so.  I acknowledge that there have specifically been no guarantees as to the cosmetic results from the procedure.  I am aware that with any procedure there is always the possibility of an unexpected complication.    III. POST-PROCEDURAL CARE (WHAT YOU WILL NEED TO DO \"AFTER THE BIOPSY\" TO OPTIMIZE HEALING)    Keep the area clean and dry.  Try NOT to remove the bandage or get it wet for the first 24 hours.    Gently clean the area and apply surgical ointment (such as Vaseline petrolatum ointment, which is available \"over the counter\" and not a prescription) to the biopsy site for up to 2 weeks straight.  This acts to protect the wound from the outside world.      Sutures are not usually placed in this procedure.  If any sutures were placed, return for suture removal as instructed (generally 1 week for the face, 2 weeks for the body).      Take Acetaminophen (Tylenol) for discomfort, if no contraindications.  Ibuprofen or aspirin could make bleeding worse.    Call our office immediately for signs of infection: fever, chills, increased redness, warmth, tenderness, discomfort/pain, or pus or foul smell coming from the wound.    WHAT TO DO IF THERE IS ANY BLEEDING?  If a small amount of bleeding is noticed, place a clean cloth over the area and apply firm pressure for ten minutes.  Check the wound after 10 minutes of direct pressure.  If bleeding persists, try one more time for an additional 10 minutes of direct pressure on the area.  If the bleeding becomes heavier or does not stop after the second attempt, or if you have any other questions about this procedure, then please call your [facility]'s Dermatologist by calling [phone] (SKIN).     I hereby acknowledge that I have reviewed and verified the site with my Dermatologist and have requested and authorized my " Dermatologist to proceed with the procedure.           LIPOMA    Physical Exam:  Anatomic Location Affected:  left forearm   Morphological Description:  subcutaneous nodules   Pertinent Positives:  Pertinent Negatives:    Additional History of Present Condition:  Has had for awhile, does not bother patient     Assessment and Plan:  Based on a thorough discussion of this condition and the management approach to it (including a comprehensive discussion of the known risks, side effects and potential benefits of treatment), the patient (family) agrees to implement the following specific plan:  Benign-assurance provided  Monitor for any changes     Lipoma  A lipoma is a non-cancerous tumor that is made up of fat cells. It slowly grows under the skin in the subcutaneous tissue. A person may have a single lipoma or may have many lipomas. They are very common.  Lipomas can occur in people of all ages, however, they tend to develop in adulthood and are most noticeable during middle age. They affect both sexes equally, although solitary lipomas are more common in women whilst multiple lipomas occur more frequently in men.    The cause of lipomas is unknown. It is possible there may be genetic involvement as many patients with lipomas come from a family with a history of these tumors. Sometimes an injury such as a blunt blow to part of the body may trigger growth of a lipoma.  People are often unaware of lipomas until they have grown large enough to become visible and palpable. This growth occurs slowly over several years. Some features of lipomas include:  A dome-shaped or egg-shaped lump about 2-10 cm in diameter (some may grow even larger)   It feels soft and smooth and is easily moved under the skin with the fingers   Some have a rubbery or doughy consistency   They are most common on the shoulders, neck, trunk and arms, but they can occur anywhere on the body where fat tissue is present.    Most lipomas are symptomless,  but some are painful on applying pressure. Lipomas that are tender or painful are usually angiolipomas. This means the lipoma has an increased number of small blood vessels. Painful lipomas are also a feature of adiposis dolorosa or Dercum disease.  Diagnosis of lipoma is usually made clinically by finding a soft lump under the skin. However, if there is any doubt, a deep skin biopsy can be performed which will show typical histopathological features of lipoma and its variants.    Most lipomas require no treatment. Most lipomas eventually stop growing and remain indefinitely without causing any problems. Occasionally, lipomas that interfere with the movement of adjacent muscles may require surgical removal. Several methods are available:  Simple surgical excision   Squeeze technique (a small incision is made over the lipoma and the fatty tissue is squeezed through the hole)   Liposuction     ACTINIC KERATOSIS    Physical Exam:  Anatomic Location Affected:  left eyebrow, right nose   Morphological Description:  Scaly pink papules  Pertinent Positives:  Pertinent Negatives:      Assessment and Plan:  Based on a thorough discussion of this condition and the management approach to it (including a comprehensive discussion of the known risks, side effects and potential benefits of treatment), the patient (family) agrees to implement the following specific plan:  When outside we recommend using a wide brim hat, sunglasses, long sleeve and pants, sunscreen with SPF 30+ with reapplication every 2 hours, or SPF specific clothing   liquid nitrogen to treat areas. Consent obtained. Expect area to blister, crust, and then fall off within 2 weeks. Please use vaseline.                                     PROCEDURE:  DESTRUCTION OF PRE-MALIGNANT LESIONS  After a thorough discussion of treatment options and risk/benefits/alternatives (including but not limited to local pain, scarring, dyspigmentation, blistering, and possible  superinfection), verbal and written consent were obtained and the aforementioned lesions were treated on with cryotherapy using liquid nitrogen x 1 cycle for 5-10 seconds.    TOTAL NUMBER of 3 pre-malignant lesions were treated today on the ANATOMIC LOCATION: left eyebrow, right nose .     The patient tolerated the procedure well, and after-care instructions were provided.        HISTORY OF BASAL CELL CARCINOMA    Physical Exam:  Anatomic Location Affected:  left neck-10/2022  Morphological Description of scar:  well healed   Suspected Recurrence: No  Pertinent Positives:  Pertinent Negatives:      Additional History of Present Condition: MOHs procedure done in 12/2022 with Dr. Romo     Assessment and Plan:  Based on a thorough discussion of this condition and the management approach to it (including a comprehensive discussion of the known risks, side effects and potential benefits of treatment), the patient (family) agrees to implement the following specific plan:  Monitor for any changes  Continue with routine skin checks yearly     How can basal cell carcinoma be prevented?  The most important way to prevent BCC is to avoid sunburn. This is especially important in childhood and early life. Fair skinned individuals and those with a personal or family history of BCC should protect their skin from sun exposure daily, year-round and lifelong.  Stay indoors or under the shade in the middle of the day   Wear covering clothing   Apply high protection factor SPF50+ broad-spectrum sunscreens generously to exposed skin if outdoors   Avoid indoor tanning (sun beds, solaria)  Oral nicotinamide (vitamin B3) in a dose of 500 mg twice daily may reduce the number and severity of BCCs.    What is the outlook for basal cell carcinoma?  Most BCCs are cured by treatment. Cure is most likely if treatment is undertaken when the lesion is small.  About 50% of people with BCC develop a second one within 3 years of the first. They are  "also at increased risk of other skin cancers, especially melanoma. Regular self-skin examinations and long-term annual skin checks by an experienced health professional are recommended.     HISTORY OF SQUAMOUS CELL CARCINOMA     Physical Exam:  Anatomic Location Affected:  inferior right cheek-9/2022  Morphological Description of Scar:  well healed  Suspected Recurrence: no  Regional adenopathy: no    Additional History of Present Condition:  MOHs procedure done in 10/2022 with Dr. Romo     Assessment and Plan:  Based on a thorough discussion of this condition and the management approach to it (including a comprehensive discussion of the known risks, side effects and potential benefits of treatment), the patient (family) agrees to implement the following specific plan:  Monitor for any changes  Continue with routine skin checks yearly     How can SCC be prevented?  The most important way to prevent SCC is to avoid sunburn. This is especially important in childhood and early life. Fair skinned individuals and those with a personal or family history of BCC should protect their skin from sun exposure daily, year-round and lifelong.  Stay indoors or under the shade in the middle of the day   Wear covering clothing   Apply high protection factor SPF50+ broad-spectrum sunscreens generously to exposed skin if outdoors   Avoid indoor tanning (sun beds, solaria)      What is the outlook for SCC?  Most SCC are cured by treatment. Cure is most likely if treatment is undertaken when the lesion is small. A small percent of SCC's can spread to lymph  nodes and long term monitoring is indicated.   They are also at increased risk of other skin cancers, especially melanoma. Regular self-skin examinations and long-term annual skin checks by an experienced health professional are recommended.     MELANOCYTIC NEVI (\"Moles\")    Physical Exam:  Anatomic Location Affected:  Mostly on sun-exposed areas of the trunk and " "extremities  Morphological Description:  Scattered, 1-4mm round to ovoid, symmetrical-appearing, even bordered, skin colored to dark brown macules/papules, mostly in sun-exposed areas  Pertinent Positives:  Pertinent Negatives:    Additional History of Present Condition:      Assessment and Plan:  Based on a thorough discussion of this condition and the management approach to it (including a comprehensive discussion of the known risks, side effects and potential benefits of treatment), the patient (family) agrees to implement the following specific plan:  When outside we recommend using a wide brim hat, sunglasses, long sleeve and pants, sunscreen with SPF 30+ with reapplication every 2 hours, or SPF specific clothing   Benign, reassured  Annual skin check     Melanocytic Nevi  Melanocytic nevi (\"moles\") are tan or brown, raised or flat areas of the skin which have an increased number of melanocytes. Melanocytes are the cells in our body which make pigment and account for skin color.    Some moles are present at birth (I.e., \"congenital nevi\"), while others come up later in life (i.e., \"acquired nevi\").  The sun can stimulate the body to make more moles.  Sunburns are not the only thing that triggers more moles.  Chronic sun exposure can do it too.     Clinically distinguishing a healthy mole from melanoma may be difficult, even for experienced dermatologists. The \"ABCDE's\" of moles have been suggested as a means of helping to alert a person to a suspicious mole and the possible increased risk of melanoma.  The suggestions for raising alert are as follows:    Asymmetry: Healthy moles tend to be symmetric, while melanomas are often asymmetric.  Asymmetry means if you draw a line through the mole, the two halves do not match in color, size, shape, or surface texture. Asymmetry can be a result of rapid enlargement of a mole, the development of a raised area on a previously flat lesion, scaling, ulceration, bleeding or " "scabbing within the mole.  Any mole that starts to demonstrate \"asymmetry\" should be examined promptly by a board certified dermatologist.     Border: Healthy moles tend to have discrete, even borders.  The border of a melanoma often blends into the normal skin and does not sharply delineate the mole from normal skin.  Any mole that starts to demonstrate \"uneven borders\" should be examined promptly by a board certified dermatologist.     Color: Healthy moles tend to be one color throughout.  Melanomas tend to be made up of different colors ranging from dark black, blue, white, or red.  Any mole that demonstrates a color change should be examined promptly by a board certified dermatologist.     Diameter: Healthy moles tend to be smaller than 0.6 cm in size; an exception are \"congenital nevi\" that can be larger.  Melanomas tend to grow and can often be greater than 0.6 cm (1/4 of an inch, or the size of a pencil eraser). This is only a guideline, and many normal moles may be larger than 0.6 cm without being unhealthy.  Any mole that starts to change in size (small to bigger or bigger to smaller) should be examined promptly by a board certified dermatologist.     Evolving: Healthy moles tend to \"stay the same.\"  Melanomas may often show signs of change or evolution such as a change in size, shape, color, or elevation.  Any mole that starts to itch, bleed, crust, burn, hurt, or ulcerate or demonstrate a change or evolution should be examined promptly by a board certified dermatologist.        LENTIGO    Physical Exam:  Anatomic Location Affected:  trunk, arms  Morphological Description:  Light brown macules  Pertinent Positives:  Pertinent Negatives:    Additional History of Present Condition:      Assessment and Plan:  Based on a thorough discussion of this condition and the management approach to it (including a comprehensive discussion of the known risks, side effects and potential benefits of treatment), the patient " (family) agrees to implement the following specific plan:  When outside we recommend using a wide brim hat, sunglasses, long sleeve and pants, sunscreen with SPF 30+ with reapplication every 2 hours, or SPF specific clothing       What is a lentigo?  A lentigo is a pigmented flat or slightly raised lesion with a clearly defined edge. Unlike an ephelis (freckle), it does not fade in the winter months. There are several kinds of lentigo.  The name lentigo originally referred to its appearance resembling a small lentil. The plural of lentigo is lentigines, although “lentigos” is also in common use.    Who gets lentigines?  Lentigines can affect males and females of all ages and races. Solar lentigines are especially prevalent in fair skinned adults. Lentigines associated with syndromes are present at birth or arise during childhood.    What causes lentigines?  Common forms of lentigo are due to exposure to ultraviolet radiation:  Sun damage including sunburn   Indoor tanning   Phototherapy, especially photochemotherapy (PUVA)    Ionizing radiation, eg radiation therapy, can also cause lentigines.  Several familial syndromes associated with widespread lentigines originate from mutations in Tadeo-MAP kinase, mTOR signaling and PTEN pathways.    What is the treatment for lentigines?  Most lentigines are left alone. Attempts to lighten them may not be successful. The following approaches are used:  SPF 50+ broad-spectrum sunscreen   Hydroquinone bleaching cream   Alpha hydroxy acids   Vitamin C   Retinoids   Azelaic acid   Chemical peels  Individual lesions can be permanently removed using:  Cryotherapy   Intense pulsed light   Pigment lasers    How can lentigines be prevented?  Lentigines associated with exposure ultraviolet radiation can be prevented by very careful sun protection. Clothing is more successful at preventing new lentigines than are sunscreens.    What is the outlook for lentigines?  Lentigines usually  "persist. They may increase in number with age and sun exposure. Some in sun-protected sites may fade and disappear.    MARRUFO ANGIOMAS    Physical Exam:  Anatomic Location Affected:  trunk  Morphological Description:  Scattered cherry red, 1-4 mm papules.  Pertinent Positives:  Pertinent Negatives:    Additional History of Present Condition:      Assessment and Plan:  Based on a thorough discussion of this condition and the management approach to it (including a comprehensive discussion of the known risks, side effects and potential benefits of treatment), the patient (family) agrees to implement the following specific plan:  Monitor for changes  Benign, reassured      Assessment and Plan:    Cherry angioma, also known as Sanders de Gildardo spots, are benign vascular skin lesions. A \"cherry angioma\" is a firm red, blue or purple papule, 0.1-1 cm in diameter. When thrombosed, they can appear black in colour until evaluated with a dermatoscope when the red or purple colour is more easily seen. Cherry angioma may develop on any part of the body but most often appear on the scalp, face, lips and trunk.  An angioma is due to proliferating endothelial cells; these are the cells that line the inside of a blood vessel.    Angiomas can arise in early life or later in life; the most common type of angioma is a cherry angioma.  Cherry angiomas are very common in males and females of any age or race. They are more noticeable in white skin than in skin of colour. They markedly increase in number from about the age of 40. There may be a family history of similar lesions. Eruptive cherry angiomas have been rarely reported to be associated with internal malignancy. The cause of angiomas is unknown. Genetic analysis of cherry angiomas has shown that they frequently carry specific somatic missense mutations in the GNAQ and GNA11 (Q209H) genes, which are involved in other vascular and melanocytic proliferations.      SEBORRHEIC " "KERATOSIS; NON-INFLAMED    Physical Exam:  Anatomic Location Affected:  trunk  Morphological Description:  Flat and raised, waxy, smooth to warty textured, yellow to brownish-grey to dark brown to blackish, discrete, \"stuck-on\" appearing papules.  Pertinent Positives:  Pertinent Negatives:    Additional History of Present Condition:      Assessment and Plan:  Based on a thorough discussion of this condition and the management approach to it (including a comprehensive discussion of the known risks, side effects and potential benefits of treatment), the patient (family) agrees to implement the following specific plan:  Monitor for changes  Benign, reassured      Seborrheic Keratosis  A seborrheic keratosis is a harmless warty spot that appears during adult life as a common sign of skin aging.  Seborrheic keratoses can arise on any area of skin, covered or uncovered, with the usual exception of the palms and soles. They do not arise from mucous membranes. Seborrheic keratoses can have highly variable appearance.      Seborrheic keratoses are extremely common. It has been estimated that over 90% of adults over the age of 60 years have one or more of them. They occur in males and females of all races, typically beginning to erupt in the 30s or 40s. They are uncommon under the age of 20 years.  The precise cause of seborrhoeic keratoses is not known.  Seborrhoeic keratoses are considered degenerative in nature. As time goes by, seborrheic keratoses tend to become more numerous. Some people inherit a tendency to develop a very large number of them; some people may have hundreds of them.      There is no easy way to remove multiple lesions on a single occasion.  Unless a specific lesion is \"inflamed\" and is causing pain or stinging/burning or is bleeding, most insurance companies do not authorize treatment.    XEROSIS (\"DRY SKIN\")    Physical Exam:  Anatomic Location Affected:  diffuse  Morphological Description:  " xerosis  Pertinent Positives:  Pertinent Negatives:    Additional History of Present Condition:      Assessment and Plan:  Based on a thorough discussion of this condition and the management approach to it (including a comprehensive discussion of the known risks, side effects and potential benefits of treatment), the patient (family) agrees to implement the following specific plan:  Use moisturizer like Eucerin,Cerave or Aveeno Cream 3 times a day for the dry skin            Dry skin refers to skin that feels dry to touch. Dry skin has a dull surface with a rough, scaly quality. The skin is less pliable and cracked. When dryness is severe, the skin may become inflamed and fissured.  Although any body site can be dry, dry skin tends to affect the shins more than any other site.    Dry skin is lacking moisture in the outer horny cell layer (stratum corneum) and this results in cracks in the skin surface.  Dry skin is also called xerosis, xeroderma or asteatosis (lack of fat).  It can affect males and females of all ages. There is some racial variability in water and lipid content of the skin.  Dry skin that starts in early childhood may be one of about 20 types of ichthyosis (fish-scale skin). There is often a family history of dry skin.   Dry skin is commonly seen in people with atopic dermatitis.  Nearly everyone > 60 years has dry skin.    Dry skin that begins later may be seen in people with certain diseases and conditions.  Postmenopausal women  Hypothyroidism  Chronic renal disease   Malnutrition and weight loss   Subclinical dermatitis   Treatment with certain drugs such as oral retinoids, diuretics and epidermal growth factor receptor inhibitors      What is the treatment for dry skin?  The mainstay of treatment of dry skin and ichthyosis is moisturisers/emollients. They should be applied liberally and often enough to:  Reduce itch   Improve the barrier function   Prevent entry of irritants, bacteria   Reduce  transepidermal water loss.      How can dry skin be prevented?  Eliminate aggravating factors:  Reduce the frequency of bathing.   A humidifier in winter and air conditioner in summer   Compare having a short shower with a prolonged soak in a bath.   Use lukewarm, not hot, water.   Replace standard soap with a substitute such as a synthetic detergent cleanser, water-miscible emollient, bath oil, anti-pruritic tar oil, colloidal oatmeal etc.   Apply an emollient liberally and often, particularly shortly after bathing, and when itchy. The drier the skin, the thicker this should be, especially on the hands.    What is the outlook for dry skin?  A tendency to dry skin may persist life-long, or it may improve once contributing factors are controlled.       Scribe Attestation      I,:  Domi Schwarz MA am acting as a scribe while in the presence of the attending physician.:       I,:  Ciarra Woo MD personally performed the services described in this documentation    as scribed in my presence.:

## 2024-08-12 PROCEDURE — 88341 IMHCHEM/IMCYTCHM EA ADD ANTB: CPT | Performed by: STUDENT IN AN ORGANIZED HEALTH CARE EDUCATION/TRAINING PROGRAM

## 2024-08-12 PROCEDURE — 88342 IMHCHEM/IMCYTCHM 1ST ANTB: CPT | Performed by: STUDENT IN AN ORGANIZED HEALTH CARE EDUCATION/TRAINING PROGRAM

## 2024-08-12 PROCEDURE — 88305 TISSUE EXAM BY PATHOLOGIST: CPT | Performed by: STUDENT IN AN ORGANIZED HEALTH CARE EDUCATION/TRAINING PROGRAM

## 2024-08-14 NOTE — RESULT ENCOUNTER NOTE
DERMATOPATHOLOGY RESULT NOTE    Results reviewed by ordering physician.  Called patient to personally discuss results. Discussed results with patient.       Instructions for Clinical Derm Team:   (remember to route Result Note to appropriate staff):    None    Result & Plan by Specimen:    Specimen A: benign  Plan: monitor and freeze at appointment next year if still there        Case Report  Surgical Pathology Report                         Case: [id number]                                  Authorizing Provider:  Mathew Rouse MD            Collected:           08/08/2024 1127              Ordering Location:     [facility] Received:            08/08/2024 1127                                     Gap                                                                          Pathologist:           Michael Marks MD                                                          Specimen:    Skin, Other, Specimen A: Left chest;                                                    Addendum  SOX10, MART-1, and PRAME immunostains were reviewed. Multiple levels examined. The final diagnosis remains unchanged.    Addendum electronically signed by Michael Marks MD on 8/13/2024 at  5:07 PM  Final Diagnosis  A. Skin, left chest, shave biopsy:    ACTINIC KERATOSIS, inflamed (see note).    Note: If the lesion were to progress or persist, additional sampling should be sought.

## 2024-10-21 ENCOUNTER — HOSPITAL ENCOUNTER (OUTPATIENT)
Dept: RADIOLOGY | Age: 64
Discharge: HOME | End: 2024-10-21
Payer: MEDICARE

## 2024-10-21 VITALS — HEIGHT: 68 IN | WEIGHT: 165 LBS | BODY MASS INDEX: 25.01 KG/M2

## 2024-10-21 DIAGNOSIS — Z12.31 ENCOUNTER FOR SCREENING MAMMOGRAM FOR MALIGNANT NEOPLASM OF BREAST: ICD-10-CM

## 2024-10-21 PROCEDURE — 77067 SCR MAMMO BI INCL CAD: CPT

## 2024-10-21 PROCEDURE — 77063 BREAST TOMOSYNTHESIS BI: CPT

## 2024-10-30 ENCOUNTER — OFFICE VISIT (OUTPATIENT)
Dept: DERMATOLOGY | Facility: CLINIC | Age: 64
End: 2024-10-30
Payer: MEDICARE

## 2024-10-30 VITALS — TEMPERATURE: 98 F | HEIGHT: 68 IN | WEIGHT: 165 LBS | BODY MASS INDEX: 25.01 KG/M2

## 2024-10-30 DIAGNOSIS — D48.9 NEOPLASM OF UNCERTAIN BEHAVIOR: Primary | ICD-10-CM

## 2024-10-30 DIAGNOSIS — Z85.89 HISTORY OF SQUAMOUS CELL CARCINOMA: ICD-10-CM

## 2024-10-30 DIAGNOSIS — Z85.828 HISTORY OF BASAL CELL CARCINOMA: ICD-10-CM

## 2024-10-30 PROCEDURE — 99214 OFFICE O/P EST MOD 30 MIN: CPT | Performed by: DERMATOLOGY

## 2024-10-30 PROCEDURE — 88305 TISSUE EXAM BY PATHOLOGIST: CPT | Performed by: STUDENT IN AN ORGANIZED HEALTH CARE EDUCATION/TRAINING PROGRAM

## 2024-10-30 PROCEDURE — 11102 TANGNTL BX SKIN SINGLE LES: CPT | Performed by: DERMATOLOGY

## 2024-10-30 NOTE — PATIENT INSTRUCTIONS
"NEOPLASM OF UNCERTAIN BEHAVIOR OF SKIN      Assessment and Plan:  I have discussed with the patient that a sample of skin via a \"skin biopsy” would be potentially helpful to further make a specific diagnosis under the microscope.  Based on a thorough discussion of this condition and the management approach to it (including a comprehensive discussion of the known risks, side effects and potential benefits of treatment), the patient (family) agrees to implement the following specific plan:    Procedure:  Skin Biopsy.  After a thorough discussion of treatment options and risk/benefits/alternatives (including but not limited to local pain, scarring, dyspigmentation, blistering, possible superinfection, and inability to confirm a diagnosis via histopathology), verbal and written consent were obtained and portion of the rash was biopsied for tissue sample.  See below for consent that was obtained from patient and subsequent Procedure Note.     PROCEDURE TANGENTIAL (SHAVE) BIOPSY NOTE:        After obtaining informed consent  at which time there was a discussion about the purpose of biopsy  and low risks of infection and bleeding.  The area was prepped and draped in the usual fashion. Anesthesia was obtained with 1% lidocaine with epinephrine. A shave biopsy to an appropriate sampling depth was obtained by Shave (Dermablade or 15 blade) The resulting wound was covered with surgical ointment and bandaged appropriately.     The patient tolerated the procedure well without complications and was without signs of functional compromise.      Specimen has been sent for review by Dermatopathology.    Standard post-procedure care has been explained and has been included in written form within the patient's copy of Informed Consent.    INFORMED CONSENT DISCUSSION AND POST-OPERATIVE INSTRUCTIONS FOR PATIENT    I.  RATIONALE FOR PROCEDURE  I understand that a skin biopsy allows the Dermatologist to test a lesion or rash under the " "microscope to obtain a diagnosis.  It usually involves numbing the area with numbing medication and removing a small piece of skin; sometimes the area will be closed with sutures. In this specific procedure, sutures are not usually needed.  If any sutures are placed, then they are usually need to be removed in 2 weeks or less.    I understand that my Dermatologist recommends that a skin \"shave\" biopsy be performed today.  A local anesthetic, similar to the kind that a dentist uses when filling a cavity, will be injected with a very small needle into the skin area to be sampled.  The injected skin and tissue underneath \"will go to sleep” and become numb so no pain should be felt afterwards.  An instrument shaped like a tiny \"razor blade\" (shave biopsy instrument) will be used to cut a small piece of tissue and skin from the area so that a sample of tissue can be taken and examined more closely under the microscope.  A slight amount of bleeding will occur, but it will be stopped with direct pressure and a pressure bandage and any other appropriate methods.  I understands that a scar will form where the wound was created.  Surgical ointment will be applied to help protect the wound.  Sutures are not usually needed.    II.  RISKS AND POTENTIAL COMPLICATIONS   I understand the risks and potential complications of a skin biopsy include but are not limited to the following:  Bleeding  Infection  Pain  Scar/keloid  Skin discoloration  Incomplete Removal  Recurrence  Nerve Damage/Numbness/Loss of Function  Allergic Reaction to Anesthesia  Biopsies are diagnostic procedures and based on findings additional treatment or evaluation may be required  Loss or destruction of specimen resulting in no additional findings    My Dermatologist has explained to me the nature of the condition, the nature of the procedure, and the benefits to be reasonably expected compared with alternative approaches.  My Dermatologist has discussed the " "likelihood of major risks or complications of this procedure including the specific risks listed above, such as bleeding, infection, and scarring/keloid.  I understand that a scar is expected after this procedure.  I understand that my physician cannot predict if the scar will form a \"keloid,\" which extends beyond the borders of the wound that is created.  A keloid is a thick, painful, and bumpy scar.  A keloid can be difficult to treat, as it does not always respond well to therapy, which includes injecting cortisone directly into the keloid every few weeks.  While this usually reduces the pain and size of the scar, it does not eliminate it.      I understand that photographs may be taken before and after the procedure.  These will be maintained as part of the medical providers confidential records and may not be made available to me.  I further authorize the medical provider to use the photographs for teaching purposes or to illustrate scientific papers, books, or lectures if in his/her judgment, medical research, education, or science may benefit from its use.    I have had an opportunity to fully inquire about the risks and benefits of this procedure and its alternatives.   I have been given ample time and opportunity to ask questions and to seek a second opinion if I wished to do so.  I acknowledge that there have specifically been no guarantees as to the cosmetic results from the procedure.  I am aware that with any procedure there is always the possibility of an unexpected complication.    III. POST-PROCEDURAL CARE (WHAT YOU WILL NEED TO DO \"AFTER THE BIOPSY\" TO OPTIMIZE HEALING)    Keep the area clean and dry.  Try NOT to remove the bandage or get it wet for the first 24 hours.    Gently clean the area and apply surgical ointment (such as Vaseline petrolatum ointment, which is available \"over the counter\" and not a prescription) to the biopsy site for up to 2 weeks straight.  This acts to protect the wound " from the outside world.      Sutures are not usually placed in this procedure.  If any sutures were placed, return for suture removal as instructed (generally 1 week for the face, 2 weeks for the body).      Take Acetaminophen (Tylenol) for discomfort, if no contraindications.  Ibuprofen or aspirin could make bleeding worse.    Call our office immediately for signs of infection: fever, chills, increased redness, warmth, tenderness, discomfort/pain, or pus or foul smell coming from the wound.    WHAT TO DO IF THERE IS ANY BLEEDING?  If a small amount of bleeding is noticed, place a clean cloth over the area and apply firm pressure for ten minutes.  Check the wound after 10 minutes of direct pressure.  If bleeding persists, try one more time for an additional 10 minutes of direct pressure on the area.  If the bleeding becomes heavier or does not stop after the second attempt, or if you have any other questions about this procedure, then please call your [facility] Dermatologist by calling [phone] (SKIN).     I hereby acknowledge that I have reviewed and verified the site with my Dermatologist and have requested and authorized my Dermatologist to proceed with the procedure.    HISTORY OF BASAL CELL CARCINOMA        Assessment and Plan:  Based on a thorough discussion of this condition and the management approach to it (including a comprehensive discussion of the known risks, side effects and potential benefits of treatment), the patient (family) agrees to implement the following specific plan:  Monitor for any changes  Continue with routine skin checks yearly   When outside we recommend using a wide brim hat, sunglasses, long sleeve and pants, sunscreen with SPF 30+ with reapplication every 2 hours, or SPF specific clothing       How can basal cell carcinoma be prevented?  The most important way to prevent BCC is to avoid sunburn. This is especially important in childhood and early life. Fair skinned individuals  and those with a personal or family history of BCC should protect their skin from sun exposure daily, year-round and lifelong.  Stay indoors or under the shade in the middle of the day   Wear covering clothing   Apply high protection factor SPF50+ broad-spectrum sunscreens generously to exposed skin if outdoors   Avoid indoor tanning (sun beds, solaria)  Oral nicotinamide (vitamin B3) in a dose of 500 mg twice daily may reduce the number and severity of BCCs.     What is the outlook for basal cell carcinoma?  Most BCCs are cured by treatment. Cure is most likely if treatment is undertaken when the lesion is small.  About 50% of people with BCC develop a second one within 3 years of the first. They are also at increased risk of other skin cancers, especially melanoma. Regular self-skin examinations and long-term annual skin checks by an experienced health professional are recommended.      HISTORY OF SQUAMOUS CELL CARCINOMA         Assessment and Plan:  Based on a thorough discussion of this condition and the management approach to it (including a comprehensive discussion of the known risks, side effects and potential benefits of treatment), the patient (family) agrees to implement the following specific plan:  Monitor for any changes  Continue with routine skin checks yearly  When outside we recommend using a wide brim hat, sunglasses, long sleeve and pants, sunscreen with SPF 30+ with reapplication every 2 hours, or SPF specific clothing        How can SCC be prevented?  The most important way to prevent SCC is to avoid sunburn. This is especially important in childhood and early life. Fair skinned individuals and those with a personal or family history of BCC should protect their skin from sun exposure daily, year-round and lifelong.  Stay indoors or under the shade in the middle of the day   Wear covering clothing   Apply high protection factor SPF50+ broad-spectrum sunscreens generously to exposed skin if  outdoors   Avoid indoor tanning (sun beds, solaria)        What is the outlook for SCC?  Most SCC are cured by treatment. Cure is most likely if treatment is undertaken when the lesion is small. A small percent of SCC's can spread to lymph  nodes and long term monitoring is indicated.   They are also at increased risk of other skin cancers, especially melanoma. Regular self-skin examinations and long-term annual skin checks by an experienced health professional are recommended.

## 2024-10-30 NOTE — PROGRESS NOTES
"[facility]atology [facility] Note     Patient Name: Celeste Bryant  Encounter Date: 10/30/24     Have you been cared for by a [facility] Dermatologist in the last 3 years and, if so, which description applies to you?    Yes.  I have been here within the last 3 years, and my medical history has NOT changed since that time.  I am FEMALE/of child-bearing potential.    REVIEW OF SYSTEMS:  Have you recently had or currently have any of the following? No changes in my recent health.   PAST MEDICAL HISTORY:  Have you personally ever had or currently have any of the following?  If \"YES,\" then please provide more detail. No changes in my medical history.   HISTORY OF IMMUNOSUPPRESSION: Do you have a history of any of the following:  Systemic Immunosuppression such as Diabetes, Biologic or Immunotherapy, Chemotherapy, Organ Transplantation, Bone Marrow Transplantation or Prednisone?  No     Answering \"YES\" requires the addition of the dotphrase \"IMMUNOSUPPRESSED\" as the first diagnosis of the patient's visit.   FAMILY HISTORY:  Any \"first degree relatives\" (parent, brother, sister, or child) with the following?    No changes in my family's known health.   PATIENT EXPERIENCE:    Do you want the Dermatologist to perform a COMPLETE skin exam today including a clinical examination under the \"bra and underwear\" areas?  NO  If necessary, do we have your permission to call and leave a detailed message on your Preferred Phone number that includes your specific medical information?  Yes      Allergies   Allergen Reactions    Sulfa Antibiotics Rash      Current Outpatient Medications:     acetaminophen (TYLENOL) 500 mg tablet, Take 500-1,000 mg by mouth every 6 (six) hours as needed , Disp: , Rfl:           Whom besides the patient is providing clinical information about today's encounter?   NO ADDITIONAL HISTORIAN (patient alone provided history)    Physical Exam and Assessment/Plan by Diagnosis:    HISTORY OF BASAL CELL CARCINOMA   "   Physical Exam:  Anatomic Location Affected:  left neck-10/2022  Morphological Description of scar:  well healed   Suspected Recurrence: No  Pertinent Positives:  Pertinent Negatives:        Additional History of Present Condition: MOHs procedure done in 12/2022 with Dr. Romo      Assessment and Plan:  Based on a thorough discussion of this condition and the management approach to it (including a comprehensive discussion of the known risks, side effects and potential benefits of treatment), the patient (family) agrees to implement the following specific plan:  Monitor for any changes  Continue with routine skin checks yearly   When outside we recommend using a wide brim hat, sunglasses, long sleeve and pants, sunscreen with SPF 30+ with reapplication every 2 hours, or SPF specific clothing       How can basal cell carcinoma be prevented?  The most important way to prevent BCC is to avoid sunburn. This is especially important in childhood and early life. Fair skinned individuals and those with a personal or family history of BCC should protect their skin from sun exposure daily, year-round and lifelong.  Stay indoors or under the shade in the middle of the day   Wear covering clothing   Apply high protection factor SPF50+ broad-spectrum sunscreens generously to exposed skin if outdoors   Avoid indoor tanning (sun beds, solaria)  Oral nicotinamide (vitamin B3) in a dose of 500 mg twice daily may reduce the number and severity of BCCs.     What is the outlook for basal cell carcinoma?  Most BCCs are cured by treatment. Cure is most likely if treatment is undertaken when the lesion is small.  About 50% of people with BCC develop a second one within 3 years of the first. They are also at increased risk of other skin cancers, especially melanoma. Regular self-skin examinations and long-term annual skin checks by an experienced health professional are recommended.      HISTORY OF SQUAMOUS CELL CARCINOMA      Physical  Exam:  Anatomic Location Affected:  inferior right cheek-9/2022  Morphological Description of Scar:  well healed  Suspected Recurrence: no  Regional adenopathy: no     Additional History of Present Condition:  MOHs procedure done in 10/2022 with Dr. Romo      Assessment and Plan:  Based on a thorough discussion of this condition and the management approach to it (including a comprehensive discussion of the known risks, side effects and potential benefits of treatment), the patient (family) agrees to implement the following specific plan:  Monitor for any changes  Continue with routine skin checks yearly   When outside we recommend using a wide brim hat, sunglasses, long sleeve and pants, sunscreen with SPF 30+ with reapplication every 2 hours, or SPF specific clothing       How can SCC be prevented?  The most important way to prevent SCC is to avoid sunburn. This is especially important in childhood and early life. Fair skinned individuals and those with a personal or family history of BCC should protect their skin from sun exposure daily, year-round and lifelong.  Stay indoors or under the shade in the middle of the day   Wear covering clothing   Apply high protection factor SPF50+ broad-spectrum sunscreens generously to exposed skin if outdoors   Avoid indoor tanning (sun beds, solaria)        What is the outlook for SCC?  Most SCC are cured by treatment. Cure is most likely if treatment is undertaken when the lesion is small. A small percent of SCC's can spread to lymph  nodes and long term monitoring is indicated.   They are also at increased risk of other skin cancers, especially melanoma. Regular self-skin examinations and long-term annual skin checks by an experienced health professional are recommended.     NEOPLASM OF UNCERTAIN BEHAVIOR OF SKIN    Physical Exam:  (Anatomic Location); (Size and Morphological Description); (Differential Diagnosis):  Specimen A: right temple; 0.5 cm x 0.4 cm skin colored  "papule with new crusting; Diff Dx: Irritated Nevus   Pertinent Positives:  Pertinent Negatives:    Additional History of Present Condition:  hx of bcc & scc     Assessment and Plan:  I have discussed with the patient that a sample of skin via a \"skin biopsy” would be potentially helpful to further make a specific diagnosis under the microscope.  Based on a thorough discussion of this condition and the management approach to it (including a comprehensive discussion of the known risks, side effects and potential benefits of treatment), the patient (family) agrees to implement the following specific plan:    Procedure:  Skin Biopsy.  After a thorough discussion of treatment options and risk/benefits/alternatives (including but not limited to local pain, scarring, dyspigmentation, blistering, possible superinfection, and inability to confirm a diagnosis via histopathology), verbal and written consent were obtained and portion of the rash was biopsied for tissue sample.  See below for consent that was obtained from patient and subsequent Procedure Note.     PROCEDURE TANGENTIAL (SHAVE) BIOPSY NOTE:    Performing Physician:    Anatomic Location; Clinical Description with size (cm); Pre-Op Diagnosis:   Specimen A: right temple; 0.5 cm x 0.4 cm skin colored papule with new crusting; Diff Dx: Irritated Nevus   Post-op diagnosis: Same     Local anesthesia: 1% xylocaine with epi      Topical anesthesia: None    Hemostasis: Aluminum chloride       After obtaining informed consent  at which time there was a discussion about the purpose of biopsy  and low risks of infection and bleeding.  The area was prepped and draped in the usual fashion. Anesthesia was obtained with 1% lidocaine with epinephrine. A shave biopsy to an appropriate sampling depth was obtained by Shave (Dermablade or 15 blade) The resulting wound was covered with surgical ointment and bandaged appropriately.     The patient tolerated the procedure well " "without complications and was without signs of functional compromise.      Specimen has been sent for review by Dermatopathology.    Standard post-procedure care has been explained and has been included in written form within the patient's copy of Informed Consent.    INFORMED CONSENT DISCUSSION AND POST-OPERATIVE INSTRUCTIONS FOR PATIENT    I.  RATIONALE FOR PROCEDURE  I understand that a skin biopsy allows the Dermatologist to test a lesion or rash under the microscope to obtain a diagnosis.  It usually involves numbing the area with numbing medication and removing a small piece of skin; sometimes the area will be closed with sutures. In this specific procedure, sutures are not usually needed.  If any sutures are placed, then they are usually need to be removed in 2 weeks or less.    I understand that my Dermatologist recommends that a skin \"shave\" biopsy be performed today.  A local anesthetic, similar to the kind that a dentist uses when filling a cavity, will be injected with a very small needle into the skin area to be sampled.  The injected skin and tissue underneath \"will go to sleep” and become numb so no pain should be felt afterwards.  An instrument shaped like a tiny \"razor blade\" (shave biopsy instrument) will be used to cut a small piece of tissue and skin from the area so that a sample of tissue can be taken and examined more closely under the microscope.  A slight amount of bleeding will occur, but it will be stopped with direct pressure and a pressure bandage and any other appropriate methods.  I understands that a scar will form where the wound was created.  Surgical ointment will be applied to help protect the wound.  Sutures are not usually needed.    II.  RISKS AND POTENTIAL COMPLICATIONS   I understand the risks and potential complications of a skin biopsy include but are not limited to the following:  Bleeding  Infection  Pain  Scar/keloid  Skin discoloration  Incomplete " "Removal  Recurrence  Nerve Damage/Numbness/Loss of Function  Allergic Reaction to Anesthesia  Biopsies are diagnostic procedures and based on findings additional treatment or evaluation may be required  Loss or destruction of specimen resulting in no additional findings    My Dermatologist has explained to me the nature of the condition, the nature of the procedure, and the benefits to be reasonably expected compared with alternative approaches.  My Dermatologist has discussed the likelihood of major risks or complications of this procedure including the specific risks listed above, such as bleeding, infection, and scarring/keloid.  I understand that a scar is expected after this procedure.  I understand that my physician cannot predict if the scar will form a \"keloid,\" which extends beyond the borders of the wound that is created.  A keloid is a thick, painful, and bumpy scar.  A keloid can be difficult to treat, as it does not always respond well to therapy, which includes injecting cortisone directly into the keloid every few weeks.  While this usually reduces the pain and size of the scar, it does not eliminate it.      I understand that photographs may be taken before and after the procedure.  These will be maintained as part of the medical providers confidential records and may not be made available to me.  I further authorize the medical provider to use the photographs for teaching purposes or to illustrate scientific papers, books, or lectures if in his/her judgment, medical research, education, or science may benefit from its use.    I have had an opportunity to fully inquire about the risks and benefits of this procedure and its alternatives.   I have been given ample time and opportunity to ask questions and to seek a second opinion if I wished to do so.  I acknowledge that there have specifically been no guarantees as to the cosmetic results from the procedure.  I am aware that with any procedure there " "is always the possibility of an unexpected complication.    III. POST-PROCEDURAL CARE (WHAT YOU WILL NEED TO DO \"AFTER THE BIOPSY\" TO OPTIMIZE HEALING)    Keep the area clean and dry.  Try NOT to remove the bandage or get it wet for the first 24 hours.    Gently clean the area and apply surgical ointment (such as Vaseline petrolatum ointment, which is available \"over the counter\" and not a prescription) to the biopsy site for up to 2 weeks straight.  This acts to protect the wound from the outside world.      Sutures are not usually placed in this procedure.  If any sutures were placed, return for suture removal as instructed (generally 1 week for the face, 2 weeks for the body).      Take Acetaminophen (Tylenol) for discomfort, if no contraindications.  Ibuprofen or aspirin could make bleeding worse.    Call our office immediately for signs of infection: fever, chills, increased redness, warmth, tenderness, discomfort/pain, or pus or foul smell coming from the wound.    WHAT TO DO IF THERE IS ANY BLEEDING?  If a small amount of bleeding is noticed, place a clean cloth over the area and apply firm pressure for ten minutes.  Check the wound after 10 minutes of direct pressure.  If bleeding persists, try one more time for an additional 10 minutes of direct pressure on the area.  If the bleeding becomes heavier or does not stop after the second attempt, or if you have any other questions about this procedure, then please call your [facility]'s Dermatologist by calling [phone] (SKIN).     I hereby acknowledge that I have reviewed and verified the site with my Dermatologist and have requested and authorized my Dermatologist to proceed with the procedure.           Scribe Attestation      I,:  Domi Schwarz MA am acting as a scribe while in the presence of the attending physician.:       I,:  Ciarra Woo MD personally performed the services described in this documentation    as scribed in my presence.:              "

## 2024-11-04 PROCEDURE — 88305 TISSUE EXAM BY PATHOLOGIST: CPT | Performed by: STUDENT IN AN ORGANIZED HEALTH CARE EDUCATION/TRAINING PROGRAM

## 2024-11-06 NOTE — RESULT ENCOUNTER NOTE
DERMATOPATHOLOGY RESULT NOTE    Results reviewed by ordering physician.  Reviewed. Sent [employer] message.      Instructions for Clinical Derm Team:   (remember to route Result Note to appropriate staff):    None    Result & Plan by Specimen:    Specimen A: benign  Plan: monitor and reassured, benign

## (undated) DEVICE — NEEDLE COUNTER LG W/RULER

## (undated) DEVICE — INTENDED FOR TISSUE SEPARATION, AND OTHER PROCEDURES THAT REQUIRE A SHARP SURGICAL BLADE TO PUNCTURE OR CUT.: Brand: BARD-PARKER SAFETY BLADES SIZE 15, STERILE

## (undated) DEVICE — SUT MONOCRYL 4-0 PS-2 27 IN Y426H

## (undated) DEVICE — MEDI-VAC YANKAUER SUCTION HANDLE W/BULBOUS AND CONTROL VENT: Brand: CARDINAL HEALTH

## (undated) DEVICE — GLOVE PI ULTRA TOUCH SZ.7.5

## (undated) DEVICE — CURITY PLAIN PACKING STRIP: Brand: CURITY

## (undated) DEVICE — ALLENTOWN DR  LUCENTE S LAP PK: Brand: CARDINAL HEALTH

## (undated) DEVICE — DGW .035 FC J3MM 260CM TEF: Brand: EMERALD

## (undated) DEVICE — Device: Brand: ASAHI SILVERWAY

## (undated) DEVICE — NEEDLE HYPO 22G X 1-1/2 IN

## (undated) DEVICE — DRAPE FLUID WARMER (BIRD BATH)

## (undated) DEVICE — TUBING SUCTION 5MM X 12 FT

## (undated) DEVICE — CATH4F INF PIG 155Â° MOD 110CM: Brand: INFINITI

## (undated) DEVICE — RADIFOCUS OPTITORQUE ANGIOGRAPHIC CATHETER: Brand: OPTITORQUE

## (undated) DEVICE — SUTURE CAPTURING DEVICE: Brand: CAPIO SLIM

## (undated) DEVICE — PACKING VAGINAL 2 IN

## (undated) DEVICE — SPONGE STICK WITH PVP-I: Brand: KENDALL

## (undated) DEVICE — SCD SEQUENTIAL COMPRESSION COMFORT SLEEVE MEDIUM KNEE LENGTH: Brand: KENDALL SCD

## (undated) DEVICE — PENCIL ELECTROSURG E-Z CLEAN -0035H

## (undated) DEVICE — BAG URINE DRAINAGE 2000ML ANTI RFLX LF

## (undated) DEVICE — SUT VICRYL 2-0 SH 27 IN UNDYED J417H

## (undated) DEVICE — ADHESIVE SKIN HIGH VISCOSITY EXOFIN 1ML

## (undated) DEVICE — INTENT TO BE USED WITH SUTURE MATERIAL FOR TISSUE CLOSURE: Brand: RICHARD-ALLAN® NEEDLE 1/2 CIRCLE TAPER

## (undated) DEVICE — SUT GORE-TEX PS-4 THX-26 M2U28

## (undated) DEVICE — GLIDESHEATH SLENDER STAINLESS STEEL KIT: Brand: GLIDESHEATH SLENDER

## (undated) DEVICE — SUT VICRYL 0 CT-1 36 IN J946H

## (undated) DEVICE — TR BAND RADIAL ARTERY COMPRESSION DEVICE: Brand: TR BAND

## (undated) DEVICE — SUT PDS II 2-0 SH 27 IN Z317H

## (undated) DEVICE — INTENDED FOR TISSUE SEPARATION, AND OTHER PROCEDURES THAT REQUIRE A SHARP SURGICAL BLADE TO PUNCTURE OR CUT.: Brand: BARD-PARKER SAFETY BLADES SIZE 10, STERILE

## (undated) DEVICE — CATH FOLEY 18FR 5ML 2 WAY UNCOATED SILICONE

## (undated) DEVICE — CAUTERY TIP POLISHER: Brand: DEVON